# Patient Record
Sex: MALE | Race: WHITE | NOT HISPANIC OR LATINO | Employment: FULL TIME | URBAN - METROPOLITAN AREA
[De-identification: names, ages, dates, MRNs, and addresses within clinical notes are randomized per-mention and may not be internally consistent; named-entity substitution may affect disease eponyms.]

---

## 2018-09-04 DIAGNOSIS — Z13.6 SCREENING FOR CARDIOVASCULAR CONDITION: Primary | ICD-10-CM

## 2018-09-07 LAB
ALBUMIN SERPL-MCNC: 4.8 G/DL (ref 3.5–5.5)
ALBUMIN/GLOB SERPL: 2.5 {RATIO} (ref 1.2–2.2)
ALP SERPL-CCNC: 74 IU/L (ref 39–117)
ALT SERPL-CCNC: 25 IU/L (ref 0–44)
AST SERPL-CCNC: 22 IU/L (ref 0–40)
BILIRUB SERPL-MCNC: 0.7 MG/DL (ref 0–1.2)
BUN SERPL-MCNC: 11 MG/DL (ref 6–24)
BUN/CREAT SERPL: 11 (ref 9–20)
CALCIUM SERPL-MCNC: 9.9 MG/DL (ref 8.7–10.2)
CHLORIDE SERPL-SCNC: 101 MMOL/L (ref 96–106)
CHOLEST SERPL-MCNC: 206 MG/DL (ref 100–199)
CHOLEST/HDLC SERPL: 3.7 RATIO (ref 0–5)
CO2 SERPL-SCNC: 25 MMOL/L (ref 20–29)
CREAT SERPL-MCNC: 1.01 MG/DL (ref 0.76–1.27)
GLOBULIN SER-MCNC: 1.9 G/DL (ref 1.5–4.5)
GLUCOSE SERPL-MCNC: 94 MG/DL (ref 65–99)
HDLC SERPL-MCNC: 56 MG/DL
LDLC SERPL CALC-MCNC: 128 MG/DL (ref 0–99)
POTASSIUM SERPL-SCNC: 4.7 MMOL/L (ref 3.5–5.2)
PROT SERPL-MCNC: 6.7 G/DL (ref 6–8.5)
SL AMB EGFR AFRICAN AMERICAN: 103 ML/MIN/1.73
SL AMB EGFR NON AFRICAN AMERICAN: 89 ML/MIN/1.73
SL AMB VLDL CHOLESTEROL CALC: 22 MG/DL (ref 5–40)
SODIUM SERPL-SCNC: 141 MMOL/L (ref 134–144)
TRIGL SERPL-MCNC: 111 MG/DL (ref 0–149)

## 2018-09-13 ENCOUNTER — OFFICE VISIT (OUTPATIENT)
Dept: FAMILY MEDICINE CLINIC | Facility: CLINIC | Age: 48
End: 2018-09-13
Payer: COMMERCIAL

## 2018-09-13 VITALS
HEART RATE: 88 BPM | BODY MASS INDEX: 32.9 KG/M2 | WEIGHT: 235 LBS | DIASTOLIC BLOOD PRESSURE: 80 MMHG | RESPIRATION RATE: 16 BRPM | SYSTOLIC BLOOD PRESSURE: 128 MMHG | HEIGHT: 71 IN

## 2018-09-13 DIAGNOSIS — Z00.00 ENCOUNTER FOR WELLNESS EXAMINATION IN ADULT: Primary | ICD-10-CM

## 2018-09-13 DIAGNOSIS — F31.78 BIPOLAR DISORDER, IN FULL REMISSION, MOST RECENT EPISODE MIXED (HCC): ICD-10-CM

## 2018-09-13 DIAGNOSIS — E78.5 BORDERLINE HYPERLIPIDEMIA: ICD-10-CM

## 2018-09-13 DIAGNOSIS — Z23 NEED FOR DIPHTHERIA-TETANUS-PERTUSSIS (TDAP) VACCINE: ICD-10-CM

## 2018-09-13 DIAGNOSIS — Z23 FLU VACCINE NEED: ICD-10-CM

## 2018-09-13 PROCEDURE — 99396 PREV VISIT EST AGE 40-64: CPT | Performed by: FAMILY MEDICINE

## 2018-09-13 PROCEDURE — 90472 IMMUNIZATION ADMIN EACH ADD: CPT

## 2018-09-13 PROCEDURE — 90715 TDAP VACCINE 7 YRS/> IM: CPT

## 2018-09-13 PROCEDURE — 90471 IMMUNIZATION ADMIN: CPT

## 2018-09-13 PROCEDURE — 90682 RIV4 VACC RECOMBINANT DNA IM: CPT

## 2018-09-13 RX ORDER — ZOLPIDEM TARTRATE 10 MG/1
10 TABLET ORAL
Refills: 1 | COMMUNITY
Start: 2018-08-26

## 2018-09-13 RX ORDER — LORAZEPAM 1 MG/1
TABLET ORAL
Refills: 1 | COMMUNITY
Start: 2018-06-29

## 2018-09-13 RX ORDER — LAMOTRIGINE 200 MG/1
TABLET ORAL
COMMUNITY
Start: 2018-09-09

## 2018-09-13 RX ORDER — FLUTICASONE PROPIONATE 50 MCG
SPRAY, SUSPENSION (ML) NASAL
COMMUNITY
Start: 2014-04-25 | End: 2022-03-04

## 2018-09-13 RX ORDER — CITALOPRAM 20 MG/1
20 TABLET ORAL DAILY
Refills: 1 | COMMUNITY
Start: 2018-09-01

## 2018-09-13 NOTE — PROGRESS NOTES
HPI:  Juno Jamison is a 56 y o  male here for his yearly health maintenance exam    Patient Active Problem List   Diagnosis    Bipolar disorder ([facility] [address] )    Borderline hyperlipidemia     Past Medical History:   Diagnosis Date    Bipolar 1 disorder ([facility] )      Routine annual physical   Labs are reviewed  Borderline cholesterol  Patient diagnosed bipolar is followed at [facility] and has been stable  1  Advanced Directive: yes     2  Durable Power of  for Healthcare: yes     3  Social History: no              Marital History:               Work Status: full time              Drug and alcohol History: remote use THC- none current              Alcohol Use: 2-4 /week     4  General Health: good              Regular Dental Visits:yes              Vision problems:glasses              Hearing Loss:no              Immunizations up to date: no                 Lifestyle:                           Healthy Diet:yes                          Tobacco Use:no                          Regular exercise:occ                          Weight concerns:yes                          Drug abuse: none     5  Over the past 2 weeks, how often have you been bothered by the following:              Little interest or pleasure in doing things:not at all              Felling down, depressed or hopeless: not at all    Current Outpatient Prescriptions   Medication Sig Dispense Refill    citalopram (CeleXA) 20 mg tablet Take 20 mg by mouth daily  1    fluticasone (FLONASE) 50 mcg/act nasal spray into each nostril      lamoTRIgine (LaMICtal) 200 MG tablet       LORazepam (ATIVAN) 1 mg tablet TAKE HALF TO 1 TABLET BY MOUTH TWICE A DAY AS NEEDED  1    zolpidem (AMBIEN) 10 mg tablet Take 10 mg by mouth daily at bedtime as needed  1     No current facility-administered medications for this visit        No Known Allergies  Immunization History   Administered Date(s) Administered    Influenza, recombinant, quadrivalent,injectable, preservative free 09/13/2018       Patient Care Team:  Lisa Goetz MD as PCP - General (Family Medicine)    Review of Systems   Constitutional: Negative for activity change, appetite change, diaphoresis and fatigue  Respiratory: Negative for cough, chest tightness, shortness of breath and wheezing  Cardiovascular: Negative for chest pain, palpitations and leg swelling  Fast or slow heart rate   Gastrointestinal: Negative for abdominal pain, blood in stool, constipation, diarrhea, nausea and vomiting  Genitourinary: Negative for difficulty urinating, dysuria and frequency  Musculoskeletal: Negative for arthralgias, gait problem, joint swelling and myalgias  Neurological: Negative for dizziness, weakness, light-headedness, numbness and headaches  Psychiatric/Behavioral: Negative for agitation, confusion, dysphoric mood and sleep disturbance  The patient is not nervous/anxious  Physical Exam :  Physical Exam   Constitutional: He is oriented to person, place, and time  He appears well-developed and well-nourished  No distress  HENT:   Head: Normocephalic and atraumatic  Right Ear: Tympanic membrane, external ear and ear canal normal    Left Ear: Tympanic membrane, external ear and ear canal normal    Nose: No mucosal edema or rhinorrhea  Right sinus exhibits no maxillary sinus tenderness  Left sinus exhibits no maxillary sinus tenderness  Mouth/Throat: Uvula is midline  Mucous membranes are not pale and not dry  Normal dentition  No oropharyngeal exudate  Eyes: EOM are normal  Pupils are equal, round, and reactive to light  Right eye exhibits no discharge  Left eye exhibits no discharge  Neck: Normal range of motion  Neck supple  No thyromegaly present  Cardiovascular: Normal rate, regular rhythm, normal heart sounds and intact distal pulses  No murmur heard  Pulmonary/Chest: Effort normal and breath sounds normal  No respiratory distress  He has no wheezes  He has no rales  Abdominal: Soft  He exhibits no mass  There is no tenderness  There is no guarding  Musculoskeletal: Normal range of motion  He exhibits no edema or tenderness  Lymphadenopathy:     He has no cervical adenopathy  Neurological: He is alert and oriented to person, place, and time  No cranial nerve deficit  Skin: No rash noted  He is not diaphoretic  No erythema  Psychiatric: He has a normal mood and affect  His behavior is normal          Reviewed Updated [facility] Prior Wellness Visits:   Last Health Maintenance visit information was reviewed, patient interviewed , no change since last HM visit yes  Last HM visit information was reviewed, patient interviewed and updates made to the record today yes    Assessment and Plan:  1  Encounter for wellness examination in adult     2  Flu vaccine need  influenza vaccine, [id number], quadrivalent, recombinant, PF, 0 5 mL, for patients 18-49 yr with comorbidities (FLUBLOK)   3  Borderline hyperlipidemia     4  Bipolar disorder, in full remission, most recent episode mixed ([facility] [address] )     5  Need for diphtheria-tetanus-pertussis (Tdap) vaccine  TDAP VACCINE GREATER THAN OR EQUAL TO 8YO IM       Health Maintenance Due   Topic Date Due    Depression Screening PHQ  1970    DTaP,Tdap,and Td Vaccines (1 - Tdap) 12/01/1992    INFLUENZA VACCINE  09/01/2018      Patient Instructions     Health maintenance-borderline cholesterol  Information given  Flu shot and tetanus shot today  Discussed decreasing total calories and increasing exercise  Metabolic screens are current  Depression screen is negative  Bipolar disorder treated at [facility] has been stable  Advanced directive is in place  Cholesterol and Your Health   AMBULATORY CARE:   Cholesterol  is a waxy, fat-like substance  Cholesterol is made by your body, but also comes from certain foods you eat  Your body uses cholesterol to make hormones and new cells  Your body also uses cholesterol to protect nerves  Cholesterol comes from foods such as meat and dairy products  Your total cholesterol level is made up by LDL cholesterol, HDL cholesterol, and triglycerides:  · LDL cholesterol  is called bad cholesterol  because it forms plaque in your arteries  As plaque builds up, your arteries become narrow, and less blood flows through  When plaque decreases blood flow to your heart, you may have chest pain  If plaque completely blocks an artery that bring blood to your heart, you may have a heart attack  Plaque can break off and form blood clots  Blood clots may block arteries in your brain and cause a stroke  · HDL cholesterol  is called good cholesterol  because it helps remove LDL cholesterol from your arteries  It does this by attaching to LDL cholesterol and carrying it to your liver  Your liver breaks down LDL cholesterol so your body can get rid of it  High levels of HDL cholesterol can help prevent a heart attack and stroke  Low levels of HDL cholesterol can increase your risk for heart disease, heart attack, and stroke  · Triglycerides  are a type of fat that store energy from foods you eat  High levels of triglycerides also cause plaque buildup  This can increase your risk for a heart attack or stroke  If your triglyceride level is high, your LDL cholesterol level may also be high  How food affects your cholesterol levels:   · Unhealthy fats  increase LDL cholesterol and triglyceride levels in your blood  They are found in foods high in cholesterol, saturated fat, and trans fat:     ¨ Cholesterol  is found in eggs, dairy, and meat  ¨ Saturated fat  is found in butter, cheese, ice cream, whole milk, and coconut oil  Saturated fat is also found in meat, such as sausage, hot dogs, and bologna  ¨ Trans fat  is found in liquid oils and is used in fried and baked foods  Foods that contain trans fats include chips, crackers, muffins, sweet rolls, microwave popcorn, and cookies      · Healthy fats,  also called unsaturated fats, help lower LDL cholesterol and triglyceride levels  Healthy fats include the following:     ¨ Monounsaturated fats  are found in foods such as olive oil, canola oil, avocado, nuts, and olives  ¨ Polyunsaturated fats,  such as omega 3 fats, are found in fish, such as salmon, trout, and tuna  They can also be found in plant foods such as flaxseed, walnuts, and soybeans  Other things that affect your cholesterol levels:   · Smoking cigarettes    · Being overweight or obese     · Drinking large amounts of alcohol    · Not enough exercise or no exercise    · Certain genes passed from your parents to you  What you need to know about having your cholesterol levels checked: Adults 21to 39years of age should have their cholesterol levels checked every 4 to 6 years  Adults 45 years and older should have their cholesterol checked every 1 to 2 years  You may need your cholesterol checked more often, or at a younger age, if you have risk factors for heart disease  You may also need to have your cholesterol checked more often if you have other health conditions, such as diabetes  Blood tests are used to check cholesterol levels  Blood tests measure your levels of triglycerides, LDL cholesterol, and HDL cholesterol  Cholesterol level goals: Your cholesterol level goal may depend on your risk for heart disease  It may also depend on your age and other health conditions  Ask your healthcare provider if the following goals are right for you:  · Your total cholesterol level  should be less than 200 mg/dL  This number may also depend on your HDL and LDL cholesterol goals  · Your LDL cholesterol level  should be less than 130 mg/dL  · Your HDL cholesterol level  should be 60 mg/dL or higher  · Your triglyceride level  should be less than 150 mg/dL  Treatment for high cholesterol:  Treatment for high cholesterol will also decrease your risk of heart disease, heart attack, and stroke  Treatment may include any of the following:  · Medicines  may be given to lower your LDL cholesterol, triglyceride levels, or total cholesterol level  You may need medicines to lower your cholesterol if any of the following is true:     ¨ You have a history of stroke, TIA, unstable angina, or a heart attack    ¨ Your LDL cholesterol level is 190 mg/dL or higher    ¨ You are age 36to 76years of age, have diabetes, and your LDL cholesterol is 70 mg/dL or higher    ¨ You are age 36to 76years of age, have risk factors for heart disease, and your LDL cholesterol is 70 mg/dL or higher    · Lifestyle changes  include changes to your diet, exercise, weight loss, and quitting smoking  It also includes decreasing the amount of alcohol you drink  · Supplements  include fish oil, red yeast rice, and garlic  Fish oil may help lower your triglyceride and LDL cholesterol levels  It may also increase your HDL cholesterol level  Red yeast rice may help decrease your total cholesterol level and LDL cholesterol level  Garlic may help lower your total cholesterol level  Do not take these supplements without talking to your healthcare provider  Nutrition to help lower your cholesterol levels:  A registered dietitian can help you create a healthy eating plan  Read food labels and choose foods low in saturated fat, trans fats, and cholesterol  · Decrease the total amount of fat you eat  Choose lean meats, fat-free or 1% fat milk, and low-fat dairy products, such as yogurt and cheese  Try to limit or avoid red meats  Limit or do not eat fried foods or baked goods such as cookies  · Replace unhealthy fats with healthy fats  Cook foods in olive oil or canola oil  Choose soft margarines that are low in saturated fat and trans fat  Seeds, nuts, and avocados are other examples of healthy fats  · Eat foods with omega-3 fats  Examples include salmon, tuna, mackerel, walnuts, and flaxseed  Eat fish 2 times per week   Children and pregnant women should not eat fish that have high levels of mercury, such as shark, swordfish, and jose alfredo mackerel  · Increase the amount of plant-based foods you eat  Plant-based foods are low in cholesterol and fat  Eating more of these foods may help lower your cholesterol and help you lose weight  Examples of plant-based foods includes fruits, vegetables, legumes, and whole grains  Replace milk that contains dairy with almond, soy, or coconut milk  Eat beans and foods with soy for protein instead of meat  Ask your healthcare provider or dietitian for more information on plant-based foods  · Increase the amount of fiber you eat  High-fiber foods can help lower your LDL cholesterol  You should eat between 20 and 30 grams of fiber each day  Eat at least 5 servings of fruits and vegetables each day  Other examples of high-fiber foods include whole-grain or whole-wheat breads, pastas, or cereals, and brown rice  Eat 3 ounces of whole-grain foods each day  Increase fiber slowly  You may have abdominal discomfort, bloating, and gas if you add fiber to your diet too quickly  Lifestyle changes you can make to help lower your cholesterol levels:   · Maintain a healthy weight  Ask your healthcare provider how much you should weigh  Ask him or her to help you create a weight loss plan if you are overweight  Weight loss can decrease your total cholesterol and triglyceride levels  · Exercise regularly  Exercise can help lower your total cholesterol level and maintain a healthy weight  Exercise can also help increase your HDL cholesterol level  Work with your healthcare provider to create an exercise program that is right for you  Get at least 30 minutes of moderate exercise most days of the week  Examples of exercise include brisk walking, swimming, or biking  · Do not smoke  Nicotine and other chemicals in cigarettes and cigars can damage your lungs, heart, and blood vessels   They can also raise your triglyceride levels  Ask your healthcare provider for information if you currently smoke and need help to quit  E-cigarettes or smokeless tobacco still contain nicotine  Talk to your healthcare provider before you use these products  · Limit or do not drink alcohol  Alcohol can increase your triglyceride levels  Ask your healthcare provider if it is safe for you to drink alcohol  Also ask how much is safe for you to drink each day  © 2017 [address] Information is for End User's use only and may not be sold, redistributed or otherwise used for commercial purposes  All illustrations and images included in CareNotes® are the copyrighted property of [employer]  or [employer]me[employer]  The above information is an  only  It is not intended as medical advice for individual conditions or treatments  Talk to your doctor, nurse or pharmacist before following any medical regimen to see if it is safe and effective for you

## 2018-09-13 NOTE — PATIENT INSTRUCTIONS
Health maintenance-borderline cholesterol  Information given  Flu shot and tetanus shot today  Discussed decreasing total calories and increasing exercise  Metabolic screens are current  Depression screen is negative  Bipolar disorder treated at [facility] has been stable  Advanced directive is in place  Cholesterol and Your Health   AMBULATORY CARE:   Cholesterol  is a waxy, fat-like substance  Cholesterol is made by your body, but also comes from certain foods you eat  Your body uses cholesterol to make hormones and new cells  Your body also uses cholesterol to protect nerves  Cholesterol comes from foods such as meat and dairy products  Your total cholesterol level is made up by LDL cholesterol, HDL cholesterol, and triglycerides:  · LDL cholesterol  is called bad cholesterol  because it forms plaque in your arteries  As plaque builds up, your arteries become narrow, and less blood flows through  When plaque decreases blood flow to your heart, you may have chest pain  If plaque completely blocks an artery that bring blood to your heart, you may have a heart attack  Plaque can break off and form blood clots  Blood clots may block arteries in your brain and cause a stroke  · HDL cholesterol  is called good cholesterol  because it helps remove LDL cholesterol from your arteries  It does this by attaching to LDL cholesterol and carrying it to your liver  Your liver breaks down LDL cholesterol so your body can get rid of it  High levels of HDL cholesterol can help prevent a heart attack and stroke  Low levels of HDL cholesterol can increase your risk for heart disease, heart attack, and stroke  · Triglycerides  are a type of fat that store energy from foods you eat  High levels of triglycerides also cause plaque buildup  This can increase your risk for a heart attack or stroke  If your triglyceride level is high, your LDL cholesterol level may also be high    How food affects your cholesterol levels:   · Unhealthy fats  increase LDL cholesterol and triglyceride levels in your blood  They are found in foods high in cholesterol, saturated fat, and trans fat:     ¨ Cholesterol  is found in eggs, dairy, and meat  ¨ Saturated fat  is found in butter, cheese, ice cream, whole milk, and coconut oil  Saturated fat is also found in meat, such as sausage, hot dogs, and bologna  ¨ Trans fat  is found in liquid oils and is used in fried and baked foods  Foods that contain trans fats include chips, crackers, muffins, sweet rolls, microwave popcorn, and cookies  · Healthy fats,  also called unsaturated fats, help lower LDL cholesterol and triglyceride levels  Healthy fats include the following:     ¨ Monounsaturated fats  are found in foods such as olive oil, canola oil, avocado, nuts, and olives  ¨ Polyunsaturated fats,  such as omega 3 fats, are found in fish, such as salmon, trout, and tuna  They can also be found in plant foods such as flaxseed, walnuts, and soybeans  Other things that affect your cholesterol levels:   · Smoking cigarettes    · Being overweight or obese     · Drinking large amounts of alcohol    · Not enough exercise or no exercise    · Certain genes passed from your parents to you  What you need to know about having your cholesterol levels checked: Adults 21to 39years of age should have their cholesterol levels checked every 4 to 6 years  Adults 45 years and older should have their cholesterol checked every 1 to 2 years  You may need your cholesterol checked more often, or at a younger age, if you have risk factors for heart disease  You may also need to have your cholesterol checked more often if you have other health conditions, such as diabetes  Blood tests are used to check cholesterol levels  Blood tests measure your levels of triglycerides, LDL cholesterol, and HDL cholesterol  Cholesterol level goals:   Your cholesterol level goal may depend on your risk for heart disease  It may also depend on your age and other health conditions  Ask your healthcare provider if the following goals are right for you:  · Your total cholesterol level  should be less than 200 mg/dL  This number may also depend on your HDL and LDL cholesterol goals  · Your LDL cholesterol level  should be less than 130 mg/dL  · Your HDL cholesterol level  should be 60 mg/dL or higher  · Your triglyceride level  should be less than 150 mg/dL  Treatment for high cholesterol:  Treatment for high cholesterol will also decrease your risk of heart disease, heart attack, and stroke  Treatment may include any of the following:  · Medicines  may be given to lower your LDL cholesterol, triglyceride levels, or total cholesterol level  You may need medicines to lower your cholesterol if any of the following is true:     ¨ You have a history of stroke, TIA, unstable angina, or a heart attack    ¨ Your LDL cholesterol level is 190 mg/dL or higher    ¨ You are age 36to 76years of age, have diabetes, and your LDL cholesterol is 70 mg/dL or higher    ¨ You are age 36to 76years of age, have risk factors for heart disease, and your LDL cholesterol is 70 mg/dL or higher    · Lifestyle changes  include changes to your diet, exercise, weight loss, and quitting smoking  It also includes decreasing the amount of alcohol you drink  · Supplements  include fish oil, red yeast rice, and garlic  Fish oil may help lower your triglyceride and LDL cholesterol levels  It may also increase your HDL cholesterol level  Red yeast rice may help decrease your total cholesterol level and LDL cholesterol level  Garlic may help lower your total cholesterol level  Do not take these supplements without talking to your healthcare provider  Nutrition to help lower your cholesterol levels:  A registered dietitian can help you create a healthy eating plan   Read food labels and choose foods low in saturated fat, trans fats, and cholesterol  · Decrease the total amount of fat you eat  Choose lean meats, fat-free or 1% fat milk, and low-fat dairy products, such as yogurt and cheese  Try to limit or avoid red meats  Limit or do not eat fried foods or baked goods such as cookies  · Replace unhealthy fats with healthy fats  Cook foods in olive oil or canola oil  Choose soft margarines that are low in saturated fat and trans fat  Seeds, nuts, and avocados are other examples of healthy fats  · Eat foods with omega-3 fats  Examples include salmon, tuna, mackerel, walnuts, and flaxseed  Eat fish 2 times per week  Children and pregnant women should not eat fish that have high levels of mercury, such as shark, swordfish, and jose alfredo mackerel  · Increase the amount of plant-based foods you eat  Plant-based foods are low in cholesterol and fat  Eating more of these foods may help lower your cholesterol and help you lose weight  Examples of plant-based foods includes fruits, vegetables, legumes, and whole grains  Replace milk that contains dairy with almond, soy, or coconut milk  Eat beans and foods with soy for protein instead of meat  Ask your healthcare provider or dietitian for more information on plant-based foods  · Increase the amount of fiber you eat  High-fiber foods can help lower your LDL cholesterol  You should eat between 20 and 30 grams of fiber each day  Eat at least 5 servings of fruits and vegetables each day  Other examples of high-fiber foods include whole-grain or whole-wheat breads, pastas, or cereals, and brown rice  Eat 3 ounces of whole-grain foods each day  Increase fiber slowly  You may have abdominal discomfort, bloating, and gas if you add fiber to your diet too quickly  Lifestyle changes you can make to help lower your cholesterol levels:   · Maintain a healthy weight  Ask your healthcare provider how much you should weigh  Ask him or her to help you create a weight loss plan if you are overweight   Weight loss can decrease your total cholesterol and triglyceride levels  · Exercise regularly  Exercise can help lower your total cholesterol level and maintain a healthy weight  Exercise can also help increase your HDL cholesterol level  Work with your healthcare provider to create an exercise program that is right for you  Get at least 30 minutes of moderate exercise most days of the week  Examples of exercise include brisk walking, swimming, or biking  · Do not smoke  Nicotine and other chemicals in cigarettes and cigars can damage your lungs, heart, and blood vessels  They can also raise your triglyceride levels  Ask your healthcare provider for information if you currently smoke and need help to quit  E-cigarettes or smokeless tobacco still contain nicotine  Talk to your healthcare provider before you use these products  · Limit or do not drink alcohol  Alcohol can increase your triglyceride levels  Ask your healthcare provider if it is safe for you to drink alcohol  Also ask how much is safe for you to drink each day  © 2017 [address] [employer] Information is for End User's use only and may not be sold, redistributed or otherwise used for commercial purposes  All illustrations and images included in CareNotes® are the copyrighted property of [employer]  or [employer]  The above information is an  only  It is not intended as medical advice for individual conditions or treatments  Talk to your doctor, nurse or pharmacist before following any medical regimen to see if it is safe and effective for you

## 2022-02-24 ENCOUNTER — TELEPHONE (OUTPATIENT)
Dept: FAMILY MEDICINE CLINIC | Facility: CLINIC | Age: 52
End: 2022-02-24

## 2022-02-24 DIAGNOSIS — E78.5 BORDERLINE HYPERLIPIDEMIA: Primary | ICD-10-CM

## 2022-03-01 LAB
ALBUMIN SERPL-MCNC: 4.6 G/DL (ref 3.6–5.1)
ALBUMIN/GLOB SERPL: 2.3 (CALC) (ref 1–2.5)
ALP SERPL-CCNC: 125 U/L (ref 35–144)
ALT SERPL-CCNC: 20 U/L (ref 9–46)
AST SERPL-CCNC: 18 U/L (ref 10–35)
BILIRUB SERPL-MCNC: 0.7 MG/DL (ref 0.2–1.2)
BUN SERPL-MCNC: 17 MG/DL (ref 7–25)
BUN/CREAT SERPL: NORMAL (CALC) (ref 6–22)
CALCIUM SERPL-MCNC: 9.7 MG/DL (ref 8.6–10.3)
CHLORIDE SERPL-SCNC: 103 MMOL/L (ref 98–110)
CHOLEST SERPL-MCNC: 199 MG/DL
CHOLEST/HDLC SERPL: 4.1 (CALC)
CO2 SERPL-SCNC: 30 MMOL/L (ref 20–32)
CREAT SERPL-MCNC: 1.1 MG/DL (ref 0.7–1.33)
GLOBULIN SER CALC-MCNC: 2 G/DL (CALC) (ref 1.9–3.7)
GLUCOSE SERPL-MCNC: 86 MG/DL (ref 65–99)
HDLC SERPL-MCNC: 48 MG/DL
LDLC SERPL CALC-MCNC: 130 MG/DL (CALC)
NONHDLC SERPL-MCNC: 151 MG/DL (CALC)
POTASSIUM SERPL-SCNC: 4.3 MMOL/L (ref 3.5–5.3)
PROT SERPL-MCNC: 6.6 G/DL (ref 6.1–8.1)
SL AMB EGFR AFRICAN AMERICAN: 90 ML/MIN/1.73M2
SL AMB EGFR NON AFRICAN AMERICAN: 78 ML/MIN/1.73M2
SODIUM SERPL-SCNC: 140 MMOL/L (ref 135–146)
TRIGL SERPL-MCNC: 100 MG/DL

## 2022-03-04 ENCOUNTER — OFFICE VISIT (OUTPATIENT)
Dept: FAMILY MEDICINE CLINIC | Facility: CLINIC | Age: 52
End: 2022-03-04
Payer: COMMERCIAL

## 2022-03-04 VITALS
HEART RATE: 69 BPM | HEIGHT: 70 IN | BODY MASS INDEX: 33.07 KG/M2 | OXYGEN SATURATION: 99 % | TEMPERATURE: 98 F | WEIGHT: 231 LBS | DIASTOLIC BLOOD PRESSURE: 84 MMHG | SYSTOLIC BLOOD PRESSURE: 133 MMHG

## 2022-03-04 DIAGNOSIS — Z00.00 ANNUAL PHYSICAL EXAM: Primary | ICD-10-CM

## 2022-03-04 DIAGNOSIS — F31.78 BIPOLAR DISORDER, IN FULL REMISSION, MOST RECENT EPISODE MIXED (HCC): ICD-10-CM

## 2022-03-04 DIAGNOSIS — E78.5 BORDERLINE HYPERLIPIDEMIA: ICD-10-CM

## 2022-03-04 DIAGNOSIS — Z12.11 ENCOUNTER FOR SCREENING COLONOSCOPY: ICD-10-CM

## 2022-03-04 PROCEDURE — 3725F SCREEN DEPRESSION PERFORMED: CPT | Performed by: FAMILY MEDICINE

## 2022-03-04 PROCEDURE — 1036F TOBACCO NON-USER: CPT | Performed by: FAMILY MEDICINE

## 2022-03-04 PROCEDURE — 99396 PREV VISIT EST AGE 40-64: CPT | Performed by: FAMILY MEDICINE

## 2022-03-04 PROCEDURE — 3008F BODY MASS INDEX DOCD: CPT | Performed by: FAMILY MEDICINE

## 2022-03-04 RX ORDER — HYDROXYZINE HYDROCHLORIDE 10 MG/1
10 TABLET, FILM COATED ORAL 3 TIMES DAILY PRN
COMMUNITY
Start: 2022-02-10

## 2022-03-04 RX ORDER — BUPROPION HYDROCHLORIDE 300 MG/1
300 TABLET ORAL DAILY
COMMUNITY
Start: 2021-12-29

## 2022-03-04 NOTE — PATIENT INSTRUCTIONS
Continue follow-up with [facility]  Would flu you to start intensify your exercise  I would ramp up the intensity  Continue to work on diet, exercise, weight loss  Repeat cholesterol, CMP, with PSA 1 year     Wellness Visit for Adults   AMBULATORY CARE:   A wellness visit  is when you see your healthcare provider to get screened for health problems  Your healthcare provider will also give you advice on how to stay healthy  Write down your questions so you remember to ask them  Ask your healthcare provider how often you should have a wellness visit  What happens at a wellness visit:  Your healthcare provider will ask about your health, and your family history of health problems  This includes high blood pressure, heart disease, and cancer  He or she will ask if you have symptoms that concern you, if you smoke, and about your mood  You may also be asked about your intake of medicines, supplements, food, and alcohol  Any of the following may be done:  · Your weight  will be checked  Your height may also be checked so your body mass index (BMI) can be calculated  Your BMI shows if you are at a healthy weight  · Your blood pressure  and heart rate will be checked  Your temperature may also be checked  · Blood and urine tests  may be done  Blood tests may be done to check your cholesterol levels  Abnormal cholesterol levels increase your risk for heart disease and stroke  You may also need a blood or urine test to check for diabetes if you are at increased risk  Urine tests may be done to look for signs of an infection or kidney disease  · A physical exam  includes checking your heartbeat and lungs with a stethoscope  Your healthcare provider may also check your skin to look for sun damage  · Screening tests  may be recommended  A screening test is done to check for diseases that may not cause symptoms   The screening tests you may need depend on your age, gender, family history, and lifestyle habits  For example, colorectal screening may be recommended if you are 48years old or older  Screening tests you need if you are a woman:   · A Pap smear  is used to screen for cervical cancer  Pap smears are usually done every 3 to 5 years depending on your age  You may need them more often if you have had abnormal Pap smear test results in the past  Ask your healthcare provider how often you should have a Pap smear  · A mammogram  is an x-ray of your breasts to screen for breast cancer  Experts recommend mammograms every 2 years starting at age 48 years  You may need a mammogram at age 52 years or younger if you have an increased risk for breast cancer  Talk to your healthcare provider about when you should start having mammograms and how often you need them  Vaccines you may need:   · Get an influenza vaccine  every year  The influenza vaccine protects you from the flu  Several types of viruses cause the flu  The viruses change over time, so new vaccines are made each year  · Get a tetanus-diphtheria (Td) booster vaccine  every 10 years  This vaccine protects you against tetanus and diphtheria  Tetanus is a severe infection that may cause painful muscle spasms and lockjaw  Diphtheria is a severe bacterial infection that causes a thick covering in the back of your mouth and throat  · Get a human papillomavirus (HPV) vaccine  if you are female and aged 23 to 32 or male 23 to 24 and never received it  This vaccine protects you from HPV infection  HPV is the most common infection spread by sexual contact  HPV may also cause vaginal, penile, and anal cancers  · Get a pneumococcal vaccine  if you are aged 72 years or older  The pneumococcal vaccine is an injection given to protect you from pneumococcal disease  Pneumococcal disease is an infection caused by pneumococcal bacteria  The infection may cause pneumonia, meningitis, or an ear infection      · Get a shingles vaccine  if you are 61 or older, even if you have had shingles before  The shingles vaccine is an injection to protect you from the varicella-zoster virus  This is the same virus that causes chickenpox  Shingles is a painful rash that develops in people who had chickenpox or have been exposed to the virus  How to eat healthy:  My Plate is a model for planning healthy meals  It shows the types and amounts of foods that should go on your plate  Fruits and vegetables make up about half of your plate, and grains and protein make up the other half  A serving of dairy is included on the side of your plate  The amount of calories and serving sizes you need depends on your age, gender, weight, and height  Examples of healthy foods are listed below:  · Eat a variety of vegetables  such as dark green, red, and orange vegetables  You can also include canned vegetables low in sodium (salt) and frozen vegetables without added butter or sauces  · Eat a variety of fresh fruits , canned fruit in 100% juice, frozen fruit, and dried fruit  · Include whole grains  At least half of the grains you eat should be whole grains  Examples include whole-wheat bread, wheat pasta, brown rice, and whole-grain cereals such as oatmeal     · Eat a variety of protein foods such as seafood (fish and shellfish), lean meat, and poultry without skin (turkey and chicken)  Examples of lean meats include pork leg, shoulder, or tenderloin, and beef round, sirloin, tenderloin, and extra lean ground beef  Other protein foods include eggs and egg substitutes, beans, peas, soy products, nuts, and seeds  · Choose low-fat dairy products such as skim or 1% milk or low-fat yogurt, cheese, and cottage cheese  · Limit unhealthy fats  such as butter, hard margarine, and shortening  Exercise:  Exercise at least 30 minutes per day on most days of the week  Some examples of exercise include walking, biking, dancing, and swimming   You can also fit in more physical activity by taking the stairs instead of the elevator or parking farther away from stores  Include muscle strengthening activities 2 days each week  Regular exercise provides many health benefits  It helps you manage your weight, and decreases your risk for type 2 diabetes, heart disease, stroke, and high blood pressure  Exercise can also help improve your mood  Ask your healthcare provider about the best exercise plan for you  General health and safety guidelines:   · Do not smoke  Nicotine and other chemicals in cigarettes and cigars can cause lung damage  Ask your healthcare provider for information if you currently smoke and need help to quit  E-cigarettes or smokeless tobacco still contain nicotine  Talk to your healthcare provider before you use these products  · Limit alcohol  A drink of alcohol is 12 ounces of beer, 5 ounces of wine, or 1½ ounces of liquor  · Lose weight, if needed  Being overweight increases your risk of certain health conditions  These include heart disease, high blood pressure, type 2 diabetes, and certain types of cancer  · Protect your skin  Do not sunbathe or use tanning beds  Use sunscreen with a SPF 15 or higher  Apply sunscreen at least 15 minutes before you go outside  Reapply sunscreen every 2 hours  Wear protective clothing, hats, and sunglasses when you are outside  · Drive safely  Always wear your seatbelt  Make sure everyone in your car wears a seatbelt  A seatbelt can save your life if you are in an accident  Do not use your cell phone when you are driving  This could distract you and cause an accident  Pull over if you need to make a call or send a text message  · Practice safe sex  Use latex condoms if are sexually active and have more than one partner  Your healthcare provider may recommend screening tests for sexually transmitted infections (STIs)  · Wear helmets, lifejackets, and protective gear    Always wear a helmet when you ride a bike or motorcycle, go skiing, or play sports that could cause a head injury  Wear protective equipment when you play sports  Wear a lifejacket when you are on a boat or doing water sports  © Copyright [employer] 2022 Information is for End User's use only and may not be sold, redistributed or otherwise used for commercial purposes  All illustrations and images included in CareNotes® are the copyrighted property of A D A M , Inc  or [facility] Hernesto Coleman   The above information is an  only  It is not intended as medical advice for individual conditions or treatments  Talk to your doctor, nurse or pharmacist before following any medical regimen to see if it is safe and effective for you

## 2022-03-04 NOTE — PROGRESS NOTES
[address]    NAME: Soila Cuello  AGE: 49 y o  SEX: male  : 1970     DATE: 3/4/2022     Assessment and Plan:     Problem List Items Addressed This Visit        Other    Bipolar disorder [facility])     Patient followed at [facility]  Psychiatrist angelica right medications  Has been stable  Relevant Medications    buPROPion (WELLBUTRIN XL) 300 mg 24 hr tablet    hydrOXYzine HCL (ATARAX) 10 mg tablet    Borderline hyperlipidemia      Other Visit Diagnoses     Annual physical exam    -  Primary    Encounter for screening colonoscopy        Relevant Orders    Ambulatory referral to Gastroenterology      Continue follow-up with [facility]  Would flu you to start intensify your exercise  I would ramp up the intensity  Continue to work on diet, exercise, weight loss  Repeat cholesterol, CMP, with PSA 1 year  Immunizations and preventive care screenings were discussed with patient today  Appropriate education was printed on patient's after visit summary  Counseling:  Alcohol/drug use: discussed moderation in alcohol intake, the recommendations for healthy alcohol use, and avoidance of illicit drug use  Dental Health: discussed importance of regular tooth brushing, flossing, and dental visits  · Exercise: the importance of regular exercise/physical activity was discussed  Recommend exercise 3-5 times per week for at least 30 minutes  Return in about 1 year (around 3/4/2023)  Chief Complaint:     Chief Complaint   Patient presents with    Physical Exam      History of Present Illness:     Adult Annual Physical   Patient here for a comprehensive physical exam  The patient reports see list     Diet and Physical Activity  · Diet/Nutrition: well balanced diet, limited junk food and consuming 3-5 servings of fruits/vegetables daily  · Exercise: 3-4 times a week on average        Depression Screening  PHQ-2/9 Depression Screening    Little interest or pleasure in doing things: 0 - not at all  Feeling down, depressed, or hopeless: 0 - not at all  PHQ-2 Score: 0  PHQ-2 Interpretation: Negative depression screen       General Health  · Sleep: gets 4-6 hours of sleep on average  · Hearing: decreased - bilateral   · Vision: readers  · Dental: regular dental visits, brushes teeth once daily and flosses teeth occasionally   Health  · Symptoms include: none     Review of Systems:     Review of Systems   Constitutional: Negative for activity change, appetite change, diaphoresis and fatigue  Respiratory: Negative for cough, chest tightness, shortness of breath and wheezing  Cardiovascular: Negative for chest pain, palpitations and leg swelling  Fast or slow heart rate   Gastrointestinal: Negative for abdominal pain, blood in stool, constipation, diarrhea, nausea and vomiting  Genitourinary: Negative for difficulty urinating, dysuria, frequency and hematuria  Musculoskeletal: Negative for arthralgias, gait problem, joint swelling and myalgias  Neurological: Negative for dizziness, light-headedness and headaches  Psychiatric/Behavioral: Positive for sleep disturbance  Negative for agitation, confusion and dysphoric mood  The patient is nervous/anxious         Past Medical History:     Past Medical History:   Diagnosis Date    [facility]       Past Surgical History:     Past Surgical History:   Procedure Laterality Date    CHOLECYSTECTOMY      HERNIA REPAIR        Family History:     Family History   Problem Relation Age of Onset    Diabetes Mother       Social History:     Social History     Socioeconomic History    Marital status: /Civil Union     Spouse name: None    Number of children: None    Years of education: None    Highest education level: None   Occupational History    None   Tobacco Use    Smoking status: Never Smoker    Smokeless tobacco: Never Used   Substance and Sexual Activity    Alcohol use: Yes    Drug use: No    Sexual activity: None   Other Topics Concern    None   Social History Narrative    None     Social Determinants of Health     Financial Resource Strain: Not on file   Food Insecurity: Not on file   Transportation Needs: Not on file   Physical Activity: Not on file   Stress: Not on file   Social Connections: Not on file   Intimate Partner Violence: Not on file   Housing Stability: Not on file      Current Medications:     Current Outpatient Medications   Medication Sig Dispense Refill    citalopram (CeleXA) 20 mg tablet Take 20 mg by mouth daily  1    lamoTRIgine (LaMICtal) 200 MG tablet       LORazepam (ATIVAN) 1 mg tablet TAKE HALF TO 1 TABLET BY MOUTH TWICE A DAY AS NEEDED  1    zolpidem (AMBIEN) 10 mg tablet Take 10 mg by mouth daily at bedtime as needed  1    buPROPion (WELLBUTRIN XL) 300 mg 24 hr tablet Take 300 mg by mouth daily      hydrOXYzine HCL (ATARAX) 10 mg tablet Take 10 mg by mouth 3 (three) times a day as needed       No current facility-administered medications for this visit  Allergies:     No Known Allergies   Physical Exam:     /84 (BP Location: Right arm, Patient Position: Sitting, Cuff Size: Standard)   Pulse 69   Temp 98 °F (36 7 °C) (Tympanic)   Ht 5' 10" (1 778 m)   Wt 105 kg (231 lb)   SpO2 99%   BMI 33 15 kg/m²     Physical Exam  Vitals and nursing note reviewed  HENT:      Head: Normocephalic and atraumatic  Right Ear: External ear normal       Left Ear: External ear normal    Eyes:      General:         Right eye: No discharge  Left eye: No discharge  Pupils: Pupils are equal, round, and reactive to light  Neck:      Thyroid: No thyromegaly  Trachea: No tracheal deviation  Cardiovascular:      Rate and Rhythm: Normal rate and regular rhythm  Heart sounds: Normal heart sounds  No murmur heard        Pulmonary:      Effort: Pulmonary effort is normal  No respiratory distress  Breath sounds: Normal breath sounds  No wheezing  Abdominal:      General: Bowel sounds are normal  There is no distension  Palpations: Abdomen is soft  There is no mass  Tenderness: There is no abdominal tenderness  Musculoskeletal:         General: Normal range of motion  Cervical back: Normal range of motion and neck supple  Right lower leg: No edema  Left lower leg: No edema  Lymphadenopathy:      Cervical: No cervical adenopathy  Neurological:      Mental Status: He is alert and oriented to person, place, and time     Psychiatric:         Mood and Affect: Mood normal          Behavior: Behavior normal           Chrystal Frankel, MD  [address]

## 2022-04-07 ENCOUNTER — OFFICE VISIT (OUTPATIENT)
Dept: FAMILY MEDICINE CLINIC | Facility: CLINIC | Age: 52
End: 2022-04-07
Payer: COMMERCIAL

## 2022-04-07 VITALS
HEART RATE: 64 BPM | WEIGHT: 232 LBS | TEMPERATURE: 97.2 F | HEIGHT: 70 IN | OXYGEN SATURATION: 98 % | BODY MASS INDEX: 33.21 KG/M2 | SYSTOLIC BLOOD PRESSURE: 126 MMHG | DIASTOLIC BLOOD PRESSURE: 74 MMHG

## 2022-04-07 DIAGNOSIS — M79.671 FOOT PAIN, RIGHT: Primary | ICD-10-CM

## 2022-04-07 PROCEDURE — 3725F SCREEN DEPRESSION PERFORMED: CPT | Performed by: FAMILY MEDICINE

## 2022-04-07 PROCEDURE — 99213 OFFICE O/P EST LOW 20 MIN: CPT | Performed by: FAMILY MEDICINE

## 2022-04-07 PROCEDURE — 3008F BODY MASS INDEX DOCD: CPT | Performed by: FAMILY MEDICINE

## 2022-04-07 PROCEDURE — 1036F TOBACCO NON-USER: CPT | Performed by: FAMILY MEDICINE

## 2022-04-07 RX ORDER — LORAZEPAM 0.5 MG/1
0.5 TABLET ORAL DAILY PRN
COMMUNITY
Start: 2022-03-09

## 2022-04-07 NOTE — PATIENT INSTRUCTIONS
Wear supportive shoes for the next 5-7 days  Ibuprofen 600 mg 2-3 times daily with food  Ice the area 10-15 minutes every 4-5 hours for the next 2-3 days  If things are not improving check x-ray of the right foot  Heart Healthy Diet   AMBULATORY CARE:   A heart healthy diet  is an eating plan low in unhealthy fats and sodium (salt)  The plan is high in healthy fats and fiber  A heart healthy diet helps improve your cholesterol levels and lowers your risk for heart disease and stroke  A dietitian will teach you how to read and understand food labels  Heart healthy diet guidelines to follow:   · Choose foods that contain healthy fats  ? Unsaturated fats  include monounsaturated and polyunsaturated fats  Unsaturated fat is found in foods such as soybean, canola, olive, corn, and safflower oils  It is also found in soft tub margarine that is made with liquid vegetable oil  ? Omega-3 fat  is found in certain fish, such as salmon, tuna, and trout, and in walnuts and flaxseed  Eat fish high in omega-3 fats at least 2 times a week  · Get 20 to 30 grams of fiber each day  Fruits, vegetables, whole-grain foods, and legumes (cooked beans) are good sources of fiber  · Limit or do not have unhealthy fats  ? Cholesterol  is found in animal foods, such as eggs and lobster, and in dairy products made from whole milk  Limit cholesterol to less than 200 mg each day  ? Saturated fat  is found in meats, such as chahal and hamburger  It is also found in chicken or turkey skin, whole milk, and butter  Limit saturated fat to less than 7% of your total daily calories  ? Trans fat  is found in packaged foods, such as potato chips and cookies  It is also in hard margarine, some fried foods, and shortening  Do not eat foods that contain trans fats  · Limit sodium as directed  You may be told to limit sodium to 2,000 to 2,300 mg each day  Choose low-sodium or no-salt-added foods   Add little or no salt to food you prepare  Use herbs and spices in place of salt  Include the following in your heart healthy plan:  Ask your dietitian or healthcare provider how many servings to have from each of the following food groups:  · Grains:      ? Whole-wheat breads, cereals, and pastas, and brown rice    ? Low-fat, low-sodium crackers and chips    · Vegetables:      ? Broccoli, green beans, green peas, and spinach    ? Collards, kale, and lima beans    ? Carrots, sweet potatoes, tomatoes, and peppers    ? Canned vegetables with no salt added    · Fruits:      ? Bananas, peaches, pears, and pineapple    ? Grapes, raisins, and dates    ? Oranges, tangerines, grapefruit, orange juice, and grapefruit juice    ? Apricots, mangoes, melons, and papaya    ? Raspberries and strawberries    ? Canned fruit with no added sugar    · Low-fat dairy:      ? Nonfat (skim) milk, 1% milk, and low-fat almond, cashew, or soy milks fortified with calcium    ? Low-fat cheese, regular or frozen yogurt, and cottage cheese    · Meats and proteins:      ? Lean cuts of beef and pork (loin, leg, round), skinless chicken and turkey    ? Legumes, soy products, egg whites, or nuts    Limit or do not include the following in your heart healthy plan:   · Unhealthy fats and oils:      ? Whole or 2% milk, cream cheese, sour cream, or cheese    ? High-fat cuts of beef (T-bone steaks, ribs), chicken or turkey with skin, and organ meats such as liver    ? Butter, stick margarine, shortening, and cooking oils such as coconut or palm oil    · Foods and liquids high in sodium:      ? Packaged foods, such as frozen dinners, cookies, macaroni and cheese, and cereals with more than 300 mg of sodium per serving    ? Vegetables with added sodium, such as instant potatoes, vegetables with added sauces, or regular canned vegetables    ? Cured or smoked meats, such as hot dogs, chahal, and sausage    ?  High-sodium ketchup, barbecue sauce, salad dressing, pickles, olives, soy sauce, or miso    · Foods and liquids high in sugar:      ? Candy, cake, cookies, pies, or doughnuts    ? Soft drinks (soda), sports drinks, or sweetened tea    ? Canned or dry mixes for cakes, soups, sauces, or gravies    Other healthy heart guidelines:   · Do not smoke  Nicotine and other chemicals in cigarettes and cigars can cause lung and heart damage  Ask your healthcare provider for information if you currently smoke and need help to quit  E-cigarettes or smokeless tobacco still contain nicotine  Talk to your healthcare provider before you use these products  · Limit or do not drink alcohol as directed  Alcohol can damage your heart and raise your blood pressure  Your healthcare provider may give you specific daily and weekly limits  The general recommended limit is 1 drink a day for women 21 or older and for men 72 or older  Do not have more than 3 drinks in a day or 7 in a week  The recommended limit is 2 drinks a day for men 24to 59years of age  Do not have more than 4 drinks in a day or 14 in a week  A drink of alcohol is 12 ounces of beer, 5 ounces of wine, or 1½ ounces of liquor  · Exercise regularly  Exercise can help you maintain a healthy weight and improve your blood pressure and cholesterol levels  Regular exercise can also decrease your risk for heart problems  Ask your healthcare provider about the best exercise plan for you  Do not start an exercise program without asking your healthcare provider  Follow up with your doctor or cardiologist as directed:  Write down your questions so you remember to ask them during your visits  © [employer] 2022 Information is for End User's use only and may not be sold, redistributed or otherwise used for commercial purposes  All illustrations and images included in CareNotes® are the copyrighted property of A [employer] A M [employer] Inc  or Hugo Coleman   The above information is an  only   It is not intended as medical advice for individual conditions or treatments  Talk to your doctor, nurse or pharmacist before following any medical regimen to see if it is safe and effective for you

## 2022-04-07 NOTE — PROGRESS NOTES
[address]        NAME: Jay Don is a 49 y o  male  : 1970    MRN: [id number]  DATE: 2022  TIME: 5:15 PM    Assessment and Plan   Foot pain, right [M79 671]  1  Foot pain, right  XR foot 3+ vw right   2  BMI 33 0-33 9,adult         No problem-specific Assessment & Plan notes found for this encounter  Patient Instructions     Patient Instructions      Wear supportive shoes for the next 5-7 days  Ibuprofen 600 mg 2-3 times daily with food  Ice the area 10-15 minutes every 4-5 hours for the next 2-3 days  If things are not improving check x-ray of the right foot  Heart Healthy Diet   AMBULATORY CARE:   A heart healthy diet  is an eating plan low in unhealthy fats and sodium (salt)  The plan is high in healthy fats and fiber  A heart healthy diet helps improve your cholesterol levels and lowers your risk for heart disease and stroke  A dietitian will teach you how to read and understand food labels  Heart healthy diet guidelines to follow:   · Choose foods that contain healthy fats  ? Unsaturated fats  include monounsaturated and polyunsaturated fats  Unsaturated fat is found in foods such as soybean, canola, olive, corn, and safflower oils  It is also found in soft tub margarine that is made with liquid vegetable oil  ? Omega-3 fat  is found in certain fish, such as salmon, tuna, and trout, and in walnuts and flaxseed  Eat fish high in omega-3 fats at least 2 times a week  · Get 20 to 30 grams of fiber each day  Fruits, vegetables, whole-grain foods, and legumes (cooked beans) are good sources of fiber  · Limit or do not have unhealthy fats  ? Cholesterol  is found in animal foods, such as eggs and lobster, and in dairy products made from whole milk  Limit cholesterol to less than 200 mg each day  ? Saturated fat  is found in meats, such as chahal and hamburger   It is also found in chicken or turkey skin, whole milk, and butter  Limit saturated fat to less than 7% of your total daily calories  ? Trans fat  is found in packaged foods, such as potato chips and cookies  It is also in hard margarine, some fried foods, and shortening  Do not eat foods that contain trans fats  · Limit sodium as directed  You may be told to limit sodium to 2,000 to 2,300 mg each day  Choose low-sodium or no-salt-added foods  Add little or no salt to food you prepare  Use herbs and spices in place of salt  Include the following in your heart healthy plan:  Ask your dietitian or healthcare provider how many servings to have from each of the following food groups:  · Grains:      ? Whole-wheat breads, cereals, and pastas, and brown rice    ? Low-fat, low-sodium crackers and chips    · Vegetables:      ? Broccoli, green beans, green peas, and spinach    ? Collards, kale, and lima beans    ? Carrots, sweet potatoes, tomatoes, and peppers    ? Canned vegetables with no salt added    · Fruits:      ? Bananas, peaches, pears, and pineapple    ? Grapes, raisins, and dates    ? Oranges, tangerines, grapefruit, orange juice, and grapefruit juice    ? Apricots, mangoes, melons, and papaya    ? Raspberries and strawberries    ? Canned fruit with no added sugar    · Low-fat dairy:      ? Nonfat (skim) milk, 1% milk, and low-fat almond, cashew, or soy milks fortified with calcium    ? Low-fat cheese, regular or frozen yogurt, and cottage cheese    · Meats and proteins:      ? Lean cuts of beef and pork (loin, leg, round), skinless chicken and turkey    ? Legumes, soy products, egg whites, or nuts    Limit or do not include the following in your heart healthy plan:   · Unhealthy fats and oils:      ? Whole or 2% milk, cream cheese, sour cream, or cheese    ? High-fat cuts of beef (T-bone steaks, ribs), chicken or turkey with skin, and organ meats such as liver    ?  Butter, stick margarine, shortening, and cooking oils such as coconut or palm oil    · Foods and liquids high in sodium:      ? Packaged foods, such as frozen dinners, cookies, macaroni and cheese, and cereals with more than 300 mg of sodium per serving    ? Vegetables with added sodium, such as instant potatoes, vegetables with added sauces, or regular canned vegetables    ? Cured or smoked meats, such as hot dogs, chahal, and sausage    ? High-sodium ketchup, barbecue sauce, salad dressing, pickles, olives, soy sauce, or miso    · Foods and liquids high in sugar:      ? Candy, cake, cookies, pies, or doughnuts    ? Soft drinks (soda), sports drinks, or sweetened tea    ? Canned or dry mixes for cakes, soups, sauces, or gravies    Other healthy heart guidelines:   · Do not smoke  Nicotine and other chemicals in cigarettes and cigars can cause lung and heart damage  Ask your healthcare provider for information if you currently smoke and need help to quit  E-cigarettes or smokeless tobacco still contain nicotine  Talk to your healthcare provider before you use these products  · Limit or do not drink alcohol as directed  Alcohol can damage your heart and raise your blood pressure  Your healthcare provider may give you specific daily and weekly limits  The general recommended limit is 1 drink a day for women 21 or older and for men 72 or older  Do not have more than 3 drinks in a day or 7 in a week  The recommended limit is 2 drinks a day for men 24to 59years of age  Do not have more than 4 drinks in a day or 14 in a week  A drink of alcohol is 12 ounces of beer, 5 ounces of wine, or 1½ ounces of liquor  · Exercise regularly  Exercise can help you maintain a healthy weight and improve your blood pressure and cholesterol levels  Regular exercise can also decrease your risk for heart problems  Ask your healthcare provider about the best exercise plan for you  Do not start an exercise program without asking your healthcare provider         Follow up with your doctor or cardiologist as directed:  Write down your questions so you remember to ask them during your visits  © Copyright [employer] 2022 Information is for End User's use only and may not be sold, redistributed or otherwise used for commercial purposes  All illustrations and images included in CareNotes® are the copyrighted property of [employer]  or Hugo Gallardo  The above information is an  only  It is not intended as medical advice for individual conditions or treatments  Talk to your doctor, nurse or pharmacist before following any medical regimen to see if it is safe and effective for you  Chief Complaint     Chief Complaint   Patient presents with    Foot Pain     right foot          History of Present Illness       Patient comes in today evaluation of right foot pain  No recognized injury  He does play pickle ball  He has localized tenderness at the 4th metatarsal head  No swelling or bruising noted on the foot  Review of Systems   Review of Systems   Respiratory: Negative for cough, chest tightness and shortness of breath  Cardiovascular: Negative for chest pain, palpitations and leg swelling  Musculoskeletal: Positive for arthralgias  Negative for joint swelling and myalgias  Skin: Negative for color change, rash and wound           Current Medications       Current Outpatient Medications:     buPROPion (WELLBUTRIN XL) 300 mg 24 hr tablet, Take 300 mg by mouth daily, Disp: , Rfl:     citalopram (CeleXA) 20 mg tablet, Take 20 mg by mouth daily, Disp: , Rfl: 1    hydrOXYzine HCL (ATARAX) 10 mg tablet, Take 10 mg by mouth 3 (three) times a day as needed, Disp: , Rfl:     lamoTRIgine (LaMICtal) 200 MG tablet, , Disp: , Rfl:     LORazepam (ATIVAN) 1 mg tablet, TAKE HALF TO 1 TABLET BY MOUTH TWICE A DAY AS NEEDED, Disp: , Rfl: 1    zolpidem (AMBIEN) 10 mg tablet, Take 10 mg by mouth daily at bedtime as needed, Disp: , Rfl: 1    LORazepam (ATIVAN) 0 5 mg tablet, Take 0 5 mg by mouth daily as needed, Disp: , Rfl:     Current Allergies     Allergies as of 04/07/2022    (No Known Allergies)            The following portions of the patient's history were reviewed and updated as appropriate: allergies, current medications, past family history, past medical history, past social history, past surgical history and problem list      Past Medical History:   Diagnosis Date    Bipolar 1 disorder ([facility] [address] )        Past Surgical History:   Procedure Laterality Date    CHOLECYSTECTOMY      HERNIA REPAIR         Family History   Problem Relation Age of Onset    Diabetes Mother          Medications have been verified  Objective   /74   Pulse 64   Temp (!) 97 2 °F (36 2 °C) (Tympanic)   Ht 5' 10" (1 778 m)   Wt 105 kg (232 lb)   SpO2 98%   BMI 33 29 kg/m²        Physical Exam     Physical Exam  Constitutional:       Appearance: He is obese  HENT:      Head: Normocephalic and atraumatic  Cardiovascular:      Rate and Rhythm: Normal rate and regular rhythm  Heart sounds: Normal heart sounds  Pulmonary:      Effort: Pulmonary effort is normal       Breath sounds: Normal breath sounds  Musculoskeletal:      Right foot: Normal range of motion  Bony tenderness present  No swelling, prominent metatarsal heads or tenderness  Comments: Tenderness at the 4th metatarsal head  No palpable deformity  Skin:     Findings: No bruising, erythema or rash  Neurological:      Mental Status: He is alert  Psychiatric:         Mood and Affect: Mood normal          Behavior: Behavior normal                  BMI Counseling: Body mass index is 33 29 kg/m²  The BMI is above normal  Nutrition recommendations include reducing portion sizes, decreasing overall calorie intake and 3-5 servings of fruits/vegetables daily

## 2022-05-18 ENCOUNTER — APPOINTMENT (OUTPATIENT)
Dept: RADIOLOGY | Facility: CLINIC | Age: 52
End: 2022-05-18
Payer: COMMERCIAL

## 2022-05-18 DIAGNOSIS — M79.671 FOOT PAIN, RIGHT: ICD-10-CM

## 2022-05-18 PROCEDURE — 73630 X-RAY EXAM OF FOOT: CPT

## 2022-05-24 ENCOUNTER — TELEPHONE (OUTPATIENT)
Dept: FAMILY MEDICINE CLINIC | Facility: CLINIC | Age: 52
End: 2022-05-24

## 2022-05-24 NOTE — TELEPHONE ENCOUNTER
----- Message from Alfonso Leon MD sent at 5/23/2022  5:20 PM EDT -----  Call patient with lab result-no abnormalities seen on x-ray  Please get progress report

## 2022-06-15 ENCOUNTER — OFFICE VISIT (OUTPATIENT)
Dept: PHYSICAL THERAPY | Facility: CLINIC | Age: 52
End: 2022-06-15
Payer: COMMERCIAL

## 2022-06-15 DIAGNOSIS — M79.671 RIGHT FOOT PAIN: Primary | ICD-10-CM

## 2022-06-15 PROCEDURE — 97161 PT EVAL LOW COMPLEX 20 MIN: CPT | Performed by: PHYSICAL THERAPIST

## 2022-06-15 NOTE — PROGRESS NOTES
PT Evaluation     Today's date: 6/15/2022  Patient name: Leatha Leiva  : 1970  MRN: [id number]  Referring provider: Damian Lainez MD  Dx:   Encounter Diagnosis     ICD-10-CM    1  Right foot pain  M79 671                  Assessment  Assessment details: Leatha Leiva is a 49 y o  male presenting to outpatient physical therapy with chief complaints of (R) foot pain  Patient describes onset of pain in April - no clear ROBER  Patient displays with abnormal gait, restricted (R) lateral digit extension, (B) ankle DF AROM restriction, good balance, positive response to foot joint mobilization, and functional restrictions  Patient's symptoms are multifactorial in nature with a primary movement diagnosis of (R) foot hypomobility resulting in pathoanatomical signs and symptoms consistent with Right foot pain  (primary encounter diagnosis) resulting in limitations in his ability to participate in recreational activities  No further referral appears necessary at this time based upon examination results  Please contact me if you have any questions ([phone])  Thank you for the opportunity to share in the care of this patient  Impairments: abnormal gait, abnormal or restricted ROM, activity intolerance, lacks appropriate home exercise program and pain with function    Symptom irritability: moderateUnderstanding of Dx/Px/POC: good   Prognosis: good  Prognosis details: Positive prognostic indicators include positive attitude toward recovery, motivated to improve, high self-efficacy, good understanding of condition, realistic expectations  Negative prognostic indicators include chronicity of symptoms  Goals  STG to be met in 2 weeks:  - Increase (R) Ankle AROM: DF to 0 degrees, lateral toe extension to 30 degrees  - I with HEP   - Patient will be able to return to walking normally  LTG to be met in 4 weeks:  - Increase (R) Ankle AROM: DF to 5 degrees, lateral toe extension to 40 degrees    - I with updated HEP   - Patient will be able to return to playing pickle ball without pain  Plan  Plan details: Prognosis above is given PT services 2x/week tapering to 1x/week over the next 4 weeks and home program adherence  Patient would benefit from: skilled physical therapy  Planned modality interventions: cryotherapy and thermotherapy: hydrocollator packs  Planned therapy interventions: joint mobilization, manual therapy, activity modification, balance, neuromuscular re-education, body mechanics training, patient education, stretching, therapeutic activities, therapeutic exercise, functional ROM exercises, home exercise program, gait training and strengthening  Plan of Care beginning date: 6/15/2022  Plan of Care expiration date: 2022  Treatment plan discussed with: patient        Subjective Evaluation    History of Present Illness  Mechanism of injury: At Evaluation (Ximena 15, 2022): Patient reports onset of (R) foot pain in April - unsure of ROBER  He describes a change in his activity level around the time of onset - started playing more pickleball  He also notes lifting a car tire with his foot around the time of onset  Since onset he describes some improvement - saw his PCP - x-ray was ordered        Red Flag Screen:  Patient denies recent fever, changes in bowel and bladder function, nausea and vomiting, weakness, unexplained weight loss, tingling, numbness, pain with coughing, or traumatic ROBER       Greatest concern: lack of full function     Quality of life: good    Pain  Current pain ratin  At best pain ratin  At worst pain ratin  Location: top of (R) foot - lateral side   Quality: dull ache    Social Support  Steps to enter house: yes  Stairs in house: yes   Lives in: multiple-level home  Lives with: spouse    Employment status: working (Desk work )    Diagnostic Tests  X-ray: normal  Treatments  No previous or current treatments  Patient Goals  Patient goal: Patient Specific Functional Scale: return to playing pickle ball 0/10, walk comfortably 0/10, stair negotiation without pain 0/10; Total 0/30        Objective     Static Posture   General Observations  Symmetrical weight bearing  Ankle/Foot   Ankle/Foot (Left): Pes planus  Ankle/Foot (Right): Pes planus  Palpation     Additional Palpation Details  TTP Base of 4th metatarsal on (R) foot     Active Range of Motion   Left Ankle/Foot   Dorsiflexion (ke): 0 degrees   Dorsiflexion (kf): 20 degrees   Plantar flexion: WFL  Inversion: WFL  Eversion: WFL  Great toe extension: WFL  Lesser toes: WFL    Right Ankle/Foot   Dorsiflexion (ke): -5 degrees   Dorsiflexion (kf): 20 degrees   Plantar flexion: WFL  Inversion: WFL  Eversion: WFL  Great toe extension: WFL  Lesser toes: 20 degrees with pain    Strength/Myotome Testing     Left Ankle/Foot   Dorsiflexion: 5  Inversion: 4+  Eversion: 4+    Right Ankle/Foot   Dorsiflexion: 5  Inversion: 4+  Eversion: 4+    Ambulation     Observational Gait   Gait: antalgic   Left stance time and left step length within functional limits  Decreased walking speed, right stance time and right step length  Left foot contact pattern: heel to toe  Right foot contact pattern: foot flat    Additional Observational Gait Details  Lacks push off at terminal stance on (R) foot              Daily Treatment Diary     DX: (R) Foot Pain   EPOC: 7/13/22  Follow Up with Referring Provider: FERN  Precautions: NA  CO-MORBIDITIES: NA  HEP ACCESS CODE: [id number]       Stage: Chronic   Stability of Symptoms:  At Evaluation: evolving - improving  Global Rating of Change:   Symptom Irritability Level: moderate  Primary Movement Diagnosis: toe hypomobility  Patient Specific Functional Scale:   6/15/22: return to playing pickle ball 0/10, walk comfortably 0/10, stair negotiation without pain 0/10;  Total 0/30  FOTO Prediction: 68 by visit 12  FOTO Progress: 49 at evaluation   Greatest Concern:  lack of full function   Current Activity Plan: added to HEP (6/15)  Current Educational Needs: progressions       Treatment Diary          Manual Therapy         Ankle Mobs         (R) Foot 4th MTJ Mobs                                                      Therapeutic Exercise  HEP         Elliptical                     Standing Gastroc Stretch 6/15         Standing Toe Ext Stretch 6/15                   Towel pulls                                                             Neuromuscular Reeducation                    SL Balance with Reach          TB Counter Balance                    LAT Heel Tap          Step Down                                                            Therapeutic Activity                              Gait Training                                        Modalities

## 2022-06-20 ENCOUNTER — APPOINTMENT (OUTPATIENT)
Dept: PHYSICAL THERAPY | Facility: CLINIC | Age: 52
End: 2022-06-20
Payer: COMMERCIAL

## 2022-06-23 ENCOUNTER — OFFICE VISIT (OUTPATIENT)
Dept: PHYSICAL THERAPY | Facility: CLINIC | Age: 52
End: 2022-06-23
Payer: COMMERCIAL

## 2022-06-23 DIAGNOSIS — M79.671 RIGHT FOOT PAIN: Primary | ICD-10-CM

## 2022-06-23 PROCEDURE — 97110 THERAPEUTIC EXERCISES: CPT | Performed by: PHYSICAL THERAPIST

## 2022-06-23 PROCEDURE — 97140 MANUAL THERAPY 1/> REGIONS: CPT | Performed by: PHYSICAL THERAPIST

## 2022-06-23 NOTE — PROGRESS NOTES
Daily Note     Today's date: 2022  Patient name: Marv Cade  : 1970  MRN: [id number]  Referring provider: Neva Nicholas MD  Dx:   Encounter Diagnosis     ICD-10-CM    1  Right foot pain  M79 671                   Subjective: Patient reports good tolerance to his IE and initial HEP  He notes he is feeling a lot better with his foot - not 100% yet though  He has been doing his HEP consistently  Objective: See treatment diary below      Assessment:   Stage: Chronic   Stability of Symptoms:  At Evaluation: evolving - improving  Global Rating of Change:   Symptom Irritability Level: moderate  Primary Movement Diagnosis: toe hypomobility  Patient Specific Functional Scale:   6/15/22: return to playing pickle ball 0/10, walk comfortably 0/10, stair negotiation without pain 0/10; Total 0/30  FOTO Prediction: 68 by visit 12  FOTO Progress: 49 at evaluation; 22: 95   Greatest Concern:  lack of full function   Current Activity Plan: added to HEP (6/15); added to HEP, discussed options for abnormal stress to ankle at work desk - take more breaks and walk, possibly use boot to limit inversion stress, return to pickle ball slowly ()  Current Educational Needs: progressions     Patient had good tolerance to manual treatment - improved ankle and toe mobility following  He demonstrated good form with previously issued HEP  He was able to progress with balance exercises well  Skilled PT continues to be required to guide progression of ankle and toe mobility to allow him to fully return to all recreational activities without pain  Plan: Continue with POC - monitor tolerance to treatment        Daily Treatment Diary     DX: (R) Foot Pain   EPOC: 22  Follow Up with Referring Provider: FERN  Precautions: NA  CO-MORBIDITIES: NA  HEP ACCESS CODE: [id number]       Treatment Diary          Manual Therapy         Ankle Mobs Post  Gr 2/3  3 min        (R) Foot 4th MTJ Mobs Gr 2/3  5 min Therapeutic Exercise  HEP         Elliptical   L1  5 min                  Standing Gastroc Stretch 6/15 30"x3        Standing Toe Ext Stretch 6/15 30"x3                  Towel pulls                                                             Neuromuscular Reeducation                    SL Balance with Reach 6/23 5 Way  5x ea        TB Counter Balance 6/23 GTB  10x ea                  LAT Heel Tap          Step Down  8" Step  10x                                                          Therapeutic Activity                              Gait Training                                        Modalities

## 2022-06-27 ENCOUNTER — APPOINTMENT (OUTPATIENT)
Dept: PHYSICAL THERAPY | Facility: CLINIC | Age: 52
End: 2022-06-27
Payer: COMMERCIAL

## 2022-06-30 ENCOUNTER — OFFICE VISIT (OUTPATIENT)
Dept: PHYSICAL THERAPY | Facility: CLINIC | Age: 52
End: 2022-06-30
Payer: COMMERCIAL

## 2022-06-30 DIAGNOSIS — M79.671 RIGHT FOOT PAIN: Primary | ICD-10-CM

## 2022-06-30 PROCEDURE — 97110 THERAPEUTIC EXERCISES: CPT | Performed by: PHYSICAL THERAPIST

## 2022-06-30 PROCEDURE — 97140 MANUAL THERAPY 1/> REGIONS: CPT | Performed by: PHYSICAL THERAPIST

## 2022-06-30 NOTE — PROGRESS NOTES
Daily Note     Today's date: 2022  Patient name: Berta Tinoco  : 1970  MRN: [id number]  Referring provider: Alexis Hannon MD  Dx:   Encounter Diagnosis     ICD-10-CM    1  Right foot pain  M79 671                   Subjective: Patient reports no adverse reaction to his LV  He reports he has returned to playing pickle ball over 1 hour with no residual pain or stiffness  Objective: See treatment diary below      Assessment:   Stage: Chronic   Stability of Symptoms:  At Evaluation: evolving - improving  Global Rating of Change:   Symptom Irritability Level: moderate  Primary Movement Diagnosis: toe hypomobility  Patient Specific Functional Scale:   6/15/22: return to playing pickle ball 0/10, walk comfortably 0/10, stair negotiation without pain 0/10; Total   FOTO Prediction: 68 by visit 12  FOTO Progress: 49 at evaluation; 22: 95   Greatest Concern:  lack of full function   Current Activity Plan: added to HEP (6/15); added to HEP, discussed options for abnormal stress to ankle at work desk - take more breaks and walk, possibly use boot to limit inversion stress, return to pickle ball slowly (); added to HEP ()  Current Educational Needs: progressions     Patient continues to respond well to manual treatment  He had good tolerance to exercise progressions and had no increased pain in the ankle throughout treatment  At this time it is recommended that he continue with an I HEP  He is to contact me if he has return of symptoms or any questions/ concerns          Plan: DC to I HEP      Daily Treatment Diary     DX: (R) Foot Pain   EPOC: 22  Follow Up with Referring Provider: FERN  Precautions: NA  CO-MORBIDITIES: NA  HEP ACCESS CODE: [id number]       Treatment Diary         Manual Therapy         Ankle Mobs Post  Gr 2/3  3 min Post  Gr 2/3  3 min       (R) Foot 4th MTJ Mobs Gr 2/3  5 min Gr 2/3  5 min                                                    Therapeutic Exercise  HEP         Elliptical   L1  5 min L2  6 min                 Standing Gastroc Stretch 6/15 30"x3 30"x3       Standing Toe Ext Stretch 6/15 30"x3 30"x3                 Towel pulls                                                             Neuromuscular Reeducation                    SL Balance with Reach 6/23 5 Way  5x ea 5 Way  5x ea       TB Counter Balance 6/23 GTB  10x ea GTB  10x ea                 LAT Heel Tap          Step Down  8" Step  10x 8" Step  15x                 FWD Lunge   15x ea       LAT Lunge   15x ea                           Therapeutic Activity                              Gait Training                                        Modalities

## 2022-12-05 PROBLEM — F12.20 MODERATE CANNABIS USE DISORDER (HCC): Status: ACTIVE | Noted: 2022-12-05

## 2022-12-13 DIAGNOSIS — F31.9 BIPOLAR AFFECTIVE DISORDER, REMISSION STATUS UNSPECIFIED (HCC): Primary | ICD-10-CM

## 2022-12-13 RX ORDER — LAMOTRIGINE 200 MG/1
TABLET ORAL
Qty: 90 TABLET | Refills: 0 | Status: SHIPPED | OUTPATIENT
Start: 2022-12-13 | End: 2023-03-08 | Stop reason: SDUPTHER

## 2022-12-13 RX ORDER — ZOLPIDEM TARTRATE 10 MG/1
TABLET ORAL
Qty: 30 TABLET | Refills: 3 | Status: SHIPPED | OUTPATIENT
Start: 2022-12-13 | End: 2023-03-08

## 2022-12-13 RX ORDER — LORAZEPAM 0.5 MG/1
TABLET ORAL
Qty: 30 TABLET | Refills: 3 | Status: SHIPPED | OUTPATIENT
Start: 2022-12-13 | End: 2023-03-08 | Stop reason: SDUPTHER

## 2022-12-13 RX ORDER — BUPROPION HYDROCHLORIDE 300 MG/1
300 TABLET ORAL DAILY
Qty: 90 TABLET | Refills: 0 | Status: SHIPPED | OUTPATIENT
Start: 2022-12-13 | End: 2023-03-08 | Stop reason: SDUPTHER

## 2023-03-08 ENCOUNTER — TELEMEDICINE (OUTPATIENT)
Dept: PSYCHIATRY | Facility: CLINIC | Age: 53
End: 2023-03-08

## 2023-03-08 DIAGNOSIS — F31.70 BIPOLAR DISORDER IN FULL REMISSION, MOST RECENT EPISODE UNSPECIFIED TYPE (HCC): Primary | ICD-10-CM

## 2023-03-08 DIAGNOSIS — F12.20 MODERATE CANNABIS USE DISORDER (HCC): ICD-10-CM

## 2023-03-08 DIAGNOSIS — F31.9 BIPOLAR AFFECTIVE DISORDER, REMISSION STATUS UNSPECIFIED (HCC): ICD-10-CM

## 2023-03-08 RX ORDER — LORAZEPAM 0.5 MG/1
TABLET ORAL
Qty: 30 TABLET | Refills: 5 | Status: SHIPPED | OUTPATIENT
Start: 2023-03-08

## 2023-03-08 RX ORDER — BUPROPION HYDROCHLORIDE 300 MG/1
300 TABLET ORAL DAILY
Qty: 90 TABLET | Refills: 1 | Status: SHIPPED | OUTPATIENT
Start: 2023-03-08

## 2023-03-08 RX ORDER — LAMOTRIGINE 200 MG/1
TABLET ORAL
Qty: 90 TABLET | Refills: 1 | Status: SHIPPED | OUTPATIENT
Start: 2023-03-08

## 2023-03-08 RX ORDER — ZOLPIDEM TARTRATE 12.5 MG/1
TABLET, FILM COATED, EXTENDED RELEASE ORAL
Qty: 30 TABLET | Refills: 5 | Status: SHIPPED | OUTPATIENT
Start: 2023-03-08

## 2023-03-08 NOTE — PSYCH
Virtual Regular Visit    Verification of patient location: at home    Patient is located in the following state in which I hold an active license [place]      Assessment/Plan:       Diagnoses and all orders for this visit:    Bipolar disorder in full remission, most recent episode unspecified type ([facility] )  -     zolpidem (AMBIEN CR) 12 5 MG CR tablet; Take 1 PO HS PRN    Moderate cannabis use disorder (HCC)    Bipolar affective disorder, remission status unspecified (HCC)  -     zolpidem (AMBIEN CR) 12 5 MG CR tablet; Take 1 PO HS PRN  -     buPROPion (WELLBUTRIN XL) 300 mg 24 hr tablet; Take 1 tablet (300 mg total) by mouth daily  -     lamoTRIgine (LaMICtal) 200 MG tablet; Take 1/2 PO BID  -     LORazepam (ATIVAN) 0 5 mg tablet; Take 1 PO QD PRN          Goals addressed in session:   Good Health  Counseling provided:      Treatment Recommendations- Risks Benefits       Immediate Medical/Psychiatric/Psychotherapy Treatments and Any Precautions:     Risks, Benefits And Possible Side Effects Of Medications:  Risks, benefits, and possible side effects of medications explained to patient and patient verbalizes understanding    Controlled Medication Discussion: The patient has been filling controlled prescriptions on time as prescribed to [facility] program      Reason for visit is No chief complaint on file  Medication Management     Encounter provider ALEK Gomes    Provider located at [address] [facility]  [address]  [phone]      Recent Visits  No visits were found meeting these conditions    Showing recent visits within past 7 days and meeting all other requirements  Today's Visits  Date Type Provider Dept   03/08/23 Telemedicine Vamsi oGmes [facility] today's visits and meeting all other requirements  Future Appointments  No visits were found meeting these conditions  Showing future appointments within next 150 days and meeting all other requirements       The patient was identified by name and date of birth  Vanesa Duran was informed that this is a telemedicine visit and that the visit is being conducted throughthe [employer]  He agrees to proceed     My office door was closed  No one else was in the room  He acknowledged consent and understanding of privacy and security of the video platform  The patient has agreed to participate and understands they can discontinue the visit at any time  Patient is aware this is a billable service  Subjective    Vanesa Duran is a 47 y o  male    Here today for a med check  This was via ReSnap    normal appetite      HPI  Mood good  Anxiety manageable   Uses Ativan occasionally as a PRN   No problems with medication   Appetite Sleep good  Health OK   Asking about Ambien CR  Denies SI/HI    Past Medical History:   Diagnosis Date   • Bipolar 1 disorder ([facility] [address] )        Past Surgical History:   Procedure Laterality Date   • CHOLECYSTECTOMY     • HERNIA REPAIR         Current Outpatient Medications   Medication Sig Dispense Refill   • buPROPion (WELLBUTRIN XL) 300 mg 24 hr tablet Take 1 tablet (300 mg total) by mouth daily 90 tablet 1   • lamoTRIgine (LaMICtal) 200 MG tablet Take 1/2 PO BID 90 tablet 1   • LORazepam (ATIVAN) 0 5 mg tablet Take 1 PO QD PRN 30 tablet 5   • zolpidem (AMBIEN CR) 12 5 MG CR tablet Take 1 PO HS PRN 30 tablet 5     No current facility-administered medications for this visit          No Known Allergies    Social History     Substance and Sexual Activity   Drug Use No       Family History   Problem Relation Age of Onset   • Diabetes Mother            Objective    Mental status:  Appearance calm and cooperative , adequate hygiene and grooming and good eye contact    Mood mood appropriate   Affect affect appropriate    Speech a normal rate and fluent   Thought Processes normal thought processes   Hallucinations no hallucinations present    Thought Content no delusions   Abnormal Thoughts no suicidal thoughts  and no homicidal thoughts    Orientation  oriented to person and place and time   Remote Memory short term memory intact and long term memory intact   Attention Span concentration intact   Intellect Appears to be of Average Intelligence   Insight Insight intact   Judgement judgment was intact   Muscle Strength Muscle strength and tone were normal and Normal gait    Language no difficulty naming common objects   Fund of Knowledge displays adequate knowledge of current events   Pain none   Pain Scale 0       Video Exam    There were no vitals filed for this visit      I spent 15 minutes directly with the patient during this visit    Patient Instructions   Continue Tx  D/C Ambien and start Ambien CR  Report Problems  Return 5 months       Visit Time    Visit Start Time: 3051  Visit Stop Time: 9311  Total Visit Duration: 17 min

## 2023-03-08 NOTE — BH TREATMENT PLAN
TREATMENT PLAN (Medication Management Only)        [facility]    Name and Date of Birth:  Johnny Coates 47 y o  1970  Date of Treatment Plan: March 8, 2023  Diagnosis/Diagnoses:    1  Bipolar disorder in full remission, most recent episode unspecified type ([facility] )    2  Moderate cannabis use disorder (HCC)    3  Bipolar affective disorder, remission status unspecified ([facility] )      Strengths/Personal Resources for Self-Care: supportive family, supportive friends, taking medications as prescribed, ability to adapt to life changes, ability to communicate needs, ability to communicate well, ability to listen, ability to reason, ability to understand psychiatric illness  Area/Areas of need (in own words): mood swings  1  Long Term Goal: maintain mood stability  Target Date:6 months - 9/8/2023  Person/Persons responsible for completion of goal: Media Crank  2  Short Term Objective (s) - How will we reach this goal?:   A  Provider new recommended medication/dosage changes and/or continue medication(s): continue current medications as prescribed Wellbutrin XL, Lamictal, Ativan, Ambien CR   B  N/A   C  N/A  Target Date:6 months - 9/8/2023  Person/Persons Responsible for Completion of Goal: Media Crank  Progress Towards Goals: stable  Treatment Modality: medication management every 5 months  Review due 180 days from date of this plan: 6 months - 9/8/2023  Expected length of service: maintenance  My Physician/PA/NP and I have developed this plan together and I agree to work on the goals and objectives  I understand the treatment goals that were developed for my treatment

## 2023-04-11 PROBLEM — F12.20 MODERATE CANNABIS USE DISORDER (HCC): Status: RESOLVED | Noted: 2022-12-05 | Resolved: 2023-04-11

## 2023-04-24 ENCOUNTER — TELEPHONE (OUTPATIENT)
Dept: FAMILY MEDICINE CLINIC | Facility: CLINIC | Age: 53
End: 2023-04-24

## 2023-04-24 NOTE — TELEPHONE ENCOUNTER
----- Message from Linda Leal MD sent at 4/24/2023 12:16 PM EDT -----  Call patient with lab result-normal study

## 2023-04-24 NOTE — TELEPHONE ENCOUNTER
----- Message from Jackeline Lozano MD sent at 4/24/2023 12:21 PM EDT -----  Call patient with lab result-Labs look good

## 2023-06-15 ENCOUNTER — TELEPHONE (OUTPATIENT)
Dept: GASTROENTEROLOGY | Facility: CLINIC | Age: 53
End: 2023-06-15

## 2023-06-15 ENCOUNTER — PREP FOR PROCEDURE (OUTPATIENT)
Dept: GASTROENTEROLOGY | Facility: CLINIC | Age: 53
End: 2023-06-15

## 2023-06-15 DIAGNOSIS — Z12.11 SCREENING FOR COLON CANCER: Primary | ICD-10-CM

## 2023-06-15 NOTE — TELEPHONE ENCOUNTER
Scheduled date of colonoscopy (as of today): 7/7/23  Physician performing colonoscopy: Jay Brown   Location of colonoscopy: [facility]  Bowel prep reviewed with patient: To be reviewed by office     Instructions reviewed with patient by:  Clearances:

## 2023-06-15 NOTE — TELEPHONE ENCOUNTER
"[facility] Assessment    Name: Kelly Hendrix  YOB: 1970  Last Height: 5' 10\" (1 778 m)  Last weight: 111 kg (245 lb)  BMI: 35 15 kg/m²  Procedure: Colon  Diagnosis: Screening colon cancer  Date of procedure: 7/7/23  Prep:   Responsible : Magnus Gomez  Phone#: [phone]  Name completing form: Kendrick Luu  Date form completed: 06/15/23      If the patient answers yes to any of these questions, schedule in a hospital  Are you pregnant: No  Do you rely on a wheelchair for mobility: No  Have you been diagnosed with End Stage Renal Disease (ESRD): No  Do you need oxygen during the day: No  Have you had a heart attack or stroke within the past three months: No  Have you had a seizure within the past three months: No  Have you ever been informed by anesthesia that you have a difficult airway: No  Additional Questions  Have you had any cardiac testing or are under the care of a Cardiologist (see cardiac list): No  Cardiac list:   Do you have an implanted cardiac defibrillator: No (Comment:  This patient should be scheduled in the hospital)    Have any bleeding problems, such as anemia or hemophilia (If patient has H&H result below 8, schedule in hospital   H&H must be within 30 days of procedure): No    Had an organ transplant within the past 3 months: No    Do you have any present infections: No  Do you get short of breath when walking a few blocks: No  Have you been diagnosed with diabetes: No  Comments (provide cardiac provider information if applicable):        "

## 2023-06-18 ENCOUNTER — DOCUMENTATION (OUTPATIENT)
Dept: PSYCHIATRY | Facility: CLINIC | Age: 53
End: 2023-06-18

## 2023-06-18 NOTE — PSYCH
[address] [facility]    Patient Name Ap To     Date of Birth: 47 y o  1970      MRN: [id number]    Admission Date: several years ago    Date of Transfer: June 18, 2023    Admission Diagnosis:     Bipolar D/O    Current Diagnosis:     No diagnosis found  Reason for Admission: Caden Nath presented for treatment due to mood instability  Primary complaints included MOOD INSTABILITY SYMPTOMS: unremarkable  Progress in Treatment: Caden Nath was seen for Medication Management  During the course of treatment he reports doing well  Episodes of Higher Level of Care: No    Transfer request Initiated by: Psychiatrist: Nurse Practitioner Hafsa Sandoval Therapist: None    Reason for Transfer Request: clinician leaving practice    Does this individual need a clinician with specialized training/expertise?: No    Is this client working with any other [facility] Providers/Therapists?  Psychiatrist: Nurse Practitioner Hafsa Sandoval Therapist: None    Other pertinent issues: None    Are there any specific individuals who would be a “best fit” or who have already agreed to accept this transfer request?      Psychiatrist: None   Therapist: None  Rationale: Not Applicable    Attempts to maintain the current therapeutic relationship: No    Transfer request routed to Clinical Coordinator for input and/or approval      Comments from other involved providers and/or clinical coordinator: ALEK Espinosa06/18/23

## 2023-06-27 ENCOUNTER — TELEPHONE (OUTPATIENT)
Dept: GASTROENTEROLOGY | Facility: CLINIC | Age: 53
End: 2023-06-27

## 2023-06-27 DIAGNOSIS — Z12.11 SCREENING FOR COLON CANCER: Primary | ICD-10-CM

## 2023-07-05 ENCOUNTER — TELEPHONE (OUTPATIENT)
Dept: PSYCHIATRY | Facility: CLINIC | Age: 53
End: 2023-07-05

## 2023-07-05 NOTE — TELEPHONE ENCOUNTER
Contacted client about cancelling appt. with ALEK Mary due to him retiring. We are in the process of bringing new providers into our practice. Once we have their schedules, we will contact you to reschedule your appt. Refills will be processed.   Any questions please call [phone]

## 2023-07-07 ENCOUNTER — ANESTHESIA (OUTPATIENT)
Dept: GASTROENTEROLOGY | Facility: AMBULATORY SURGERY CENTER | Age: 53
End: 2023-07-07

## 2023-07-07 ENCOUNTER — HOSPITAL ENCOUNTER (OUTPATIENT)
Dept: GASTROENTEROLOGY | Facility: AMBULATORY SURGERY CENTER | Age: 53
Discharge: HOME | End: 2023-07-07
Payer: COMMERCIAL

## 2023-07-07 ENCOUNTER — ANESTHESIA EVENT (OUTPATIENT)
Dept: GASTROENTEROLOGY | Facility: AMBULATORY SURGERY CENTER | Age: 53
End: 2023-07-07

## 2023-07-07 VITALS
DIASTOLIC BLOOD PRESSURE: 74 MMHG | HEIGHT: 70 IN | WEIGHT: 235 LBS | TEMPERATURE: 97.7 F | OXYGEN SATURATION: 98 % | RESPIRATION RATE: 14 BRPM | SYSTOLIC BLOOD PRESSURE: 124 MMHG | BODY MASS INDEX: 33.64 KG/M2 | HEART RATE: 61 BPM

## 2023-07-07 DIAGNOSIS — Z12.11 SCREENING FOR COLON CANCER: ICD-10-CM

## 2023-07-07 PROCEDURE — 45385 COLONOSCOPY W/LESION REMOVAL: CPT | Performed by: INTERNAL MEDICINE

## 2023-07-07 PROCEDURE — 88305 TISSUE EXAM BY PATHOLOGIST: CPT | Performed by: STUDENT IN AN ORGANIZED HEALTH CARE EDUCATION/TRAINING PROGRAM

## 2023-07-07 RX ORDER — LIDOCAINE HYDROCHLORIDE 10 MG/ML
INJECTION, SOLUTION EPIDURAL; INFILTRATION; INTRACAUDAL; PERINEURAL AS NEEDED
Status: DISCONTINUED | OUTPATIENT
Start: 2023-07-07 | End: 2023-07-07

## 2023-07-07 RX ORDER — PROPOFOL 10 MG/ML
INJECTION, EMULSION INTRAVENOUS AS NEEDED
Status: DISCONTINUED | OUTPATIENT
Start: 2023-07-07 | End: 2023-07-07

## 2023-07-07 RX ORDER — SODIUM CHLORIDE, SODIUM LACTATE, POTASSIUM CHLORIDE, CALCIUM CHLORIDE 600; 310; 30; 20 MG/100ML; MG/100ML; MG/100ML; MG/100ML
50 INJECTION, SOLUTION INTRAVENOUS CONTINUOUS
Status: DISCONTINUED | OUTPATIENT
Start: 2023-07-07 | End: 2023-07-11 | Stop reason: HOSPADM

## 2023-07-07 RX ADMIN — PROPOFOL 50 MG: 10 INJECTION, EMULSION INTRAVENOUS at 14:46

## 2023-07-07 RX ADMIN — PROPOFOL 50 MG: 10 INJECTION, EMULSION INTRAVENOUS at 14:38

## 2023-07-07 RX ADMIN — PROPOFOL 150 MG: 10 INJECTION, EMULSION INTRAVENOUS at 14:34

## 2023-07-07 RX ADMIN — SODIUM CHLORIDE, SODIUM LACTATE, POTASSIUM CHLORIDE, CALCIUM CHLORIDE 50 ML/HR: 600; 310; 30; 20 INJECTION, SOLUTION INTRAVENOUS at 14:20

## 2023-07-07 RX ADMIN — LIDOCAINE HYDROCHLORIDE 40 MG: 10 INJECTION, SOLUTION EPIDURAL; INFILTRATION; INTRACAUDAL; PERINEURAL at 14:34

## 2023-07-07 NOTE — H&P
History and Physical - [facility]  Pooja Mondragon 47 y.o. male MRN: [id number]    HPI: Pooja Mondragon is a 47y.o. year old male who presents for colon cancer screening    REVIEW OF SYSTEMS: Per the HPI, and otherwise unremarkable. Historical Information   Past Medical History:   Diagnosis Date   • Bipolar 1 disorder ([address])    • Moderate cannabis use disorder ([address]) 12/05/2022   • Sleep apnea     pt uses CPAP     Past Surgical History:   Procedure Laterality Date   • CHOLECYSTECTOMY     • COLONOSCOPY     • HERNIA REPAIR       Social History   Social History     Substance and Sexual Activity   Alcohol Use Yes     Social History     Substance and Sexual Activity   Drug Use No     Social History     Tobacco Use   Smoking Status Never   Smokeless Tobacco Never     Family History   Problem Relation Age of Onset   • Diabetes Mother        Meds/Allergies       Current Outpatient Medications:   •  zolpidem (AMBIEN CR) 12.5 MG CR tablet  •  amoxicillin (AMOXIL) 500 mg capsule  •  buPROPion (WELLBUTRIN XL) 300 mg 24 hr tablet  •  ibuprofen (MOTRIN) 800 mg tablet  •  lamoTRIgine (LaMICtal) 200 MG tablet  •  LORazepam (ATIVAN) 0.5 mg tablet  •  polyethylene glycol (GOLYTELY) 4000 mL solution    Current Facility-Administered Medications:   •  lactated ringers infusion, 50 mL/hr, Intravenous, Continuous, 50 mL/hr at 07/07/23 1420    No Known Allergies    Objective     /81   Pulse 69   Temp 97.7 °F (36.5 °C) (Temporal)   Resp (!) 11   Ht 5' 10" (1.778 m)   Wt 107 kg (235 lb)   SpO2 97%   BMI 33.72 kg/m²     PHYSICAL EXAM    Gen: NAD AAOx3  Head: Normocephalic, Atraumatic  CV: S1S2 RRR no m/r/g  CHEST: Clear b/l no c/r/w  ABD: soft, +BS NT/ND  EXT: no edema    ASSESSMENT/PLAN:  This is a 47y.o. year old male here for colonoscopy, and he is stable and optimized for his procedure.

## 2023-07-07 NOTE — ANESTHESIA PREPROCEDURE EVALUATION
Procedure:  COLONOSCOPY    Relevant Problems   CARDIO   (+) Borderline hyperlipidemia      PULMONARY   (+) Sleep apnea      Other   (+) Bipolar disorder (HCC)   (+) Obesity (BMI 30.0-34. 9)        Physical Exam    Airway    Mallampati score: II  TM Distance: >3 FB  Neck ROM: full     Dental   No notable dental hx     Cardiovascular      Pulmonary      Other Findings        Anesthesia Plan  ASA Score- 2     Anesthesia Type- IV sedation with anesthesia with ASA Monitors. Additional Monitors:   Airway Plan:           Plan Factors-    Chart reviewed. Patient summary reviewed. Patient is not a current smoker. Induction- intravenous. Postoperative Plan-     Informed Consent- Anesthetic plan and risks discussed with patient. I personally reviewed this patient with the CRNA. Discussed and agreed on the Anesthesia Plan with the CRNA. Sana Egan

## 2023-07-07 NOTE — ANESTHESIA POSTPROCEDURE EVALUATION
Post-Op Assessment Note    CV Status:  Stable    Pain management: adequate     Mental Status:  Awake and sleepy   Hydration Status:  Euvolemic   PONV Controlled:  Controlled   Airway Patency:  Patent      Post Op Vitals Reviewed: Yes      Staff: Anesthesiologist, CRNA         There were no known notable events for this encounter.     /67 (07/07/23 1454)    Temp      Pulse 74 (07/07/23 1454)   Resp 16 (07/07/23 1454)    SpO2 95 % (07/07/23 1454)

## 2023-07-11 PROCEDURE — 88305 TISSUE EXAM BY PATHOLOGIST: CPT | Performed by: STUDENT IN AN ORGANIZED HEALTH CARE EDUCATION/TRAINING PROGRAM

## 2023-09-11 DIAGNOSIS — F31.9 BIPOLAR AFFECTIVE DISORDER, REMISSION STATUS UNSPECIFIED (HCC): ICD-10-CM

## 2023-09-11 DIAGNOSIS — F31.70 BIPOLAR DISORDER IN FULL REMISSION, MOST RECENT EPISODE UNSPECIFIED TYPE (HCC): ICD-10-CM

## 2023-09-11 RX ORDER — ZOLPIDEM TARTRATE 12.5 MG/1
TABLET, FILM COATED, EXTENDED RELEASE ORAL
Qty: 10 TABLET | Refills: 1 | Status: SHIPPED | OUTPATIENT
Start: 2023-09-11 | End: 2023-09-19 | Stop reason: SDUPTHER

## 2023-09-11 RX ORDER — LORAZEPAM 0.5 MG/1
TABLET ORAL
Qty: 10 TABLET | Refills: 1 | Status: SHIPPED | OUTPATIENT
Start: 2023-09-11 | End: 2023-09-19 | Stop reason: SDUPTHER

## 2023-09-11 NOTE — TELEPHONE ENCOUNTER
Pt alona Corrales , 1970 , SLPF chart was reviewed.  PDMP reviewed - w/o discrepancies- refilled Zolipidem and Lorazepam 08/13/2023      Sent Zolipidem 12.5mg and Lorazepam 0.5mg to [facility] pharmacy    This note was not shared with the patient due to reasonable likelihood of causing patient harm
Pt requested refill of the lorazepam 0.5, and zolpidem 12.5    Said that he will run out before his appt on the 20th
Awake/Alert

## 2023-09-19 ENCOUNTER — OFFICE VISIT (OUTPATIENT)
Dept: PSYCHIATRY | Facility: CLINIC | Age: 53
End: 2023-09-19
Payer: COMMERCIAL

## 2023-09-19 DIAGNOSIS — F41.1 GENERALIZED ANXIETY DISORDER: Primary | ICD-10-CM

## 2023-09-19 DIAGNOSIS — F31.70 BIPOLAR I DISORDER IN REMISSION (HCC): ICD-10-CM

## 2023-09-19 PROCEDURE — 99214 OFFICE O/P EST MOD 30 MIN: CPT | Performed by: PSYCHIATRY & NEUROLOGY

## 2023-09-19 RX ORDER — ZOLPIDEM TARTRATE 12.5 MG/1
TABLET, FILM COATED, EXTENDED RELEASE ORAL
Qty: 30 TABLET | Refills: 2 | Status: SHIPPED | OUTPATIENT
Start: 2023-09-19

## 2023-09-19 RX ORDER — LAMOTRIGINE 200 MG/1
TABLET ORAL
Qty: 90 TABLET | Refills: 1 | Status: SHIPPED | OUTPATIENT
Start: 2023-09-19

## 2023-09-19 RX ORDER — LORAZEPAM 0.5 MG/1
TABLET ORAL
Qty: 30 TABLET | Refills: 2 | Status: SHIPPED | OUTPATIENT
Start: 2023-09-19

## 2023-09-19 RX ORDER — ESCITALOPRAM OXALATE 10 MG/1
TABLET ORAL
Qty: 30 TABLET | Refills: 1 | Status: SHIPPED | OUTPATIENT
Start: 2023-09-19

## 2023-09-19 RX ORDER — BUPROPION HYDROCHLORIDE 300 MG/1
TABLET ORAL
Qty: 90 TABLET | Refills: 0 | Status: SHIPPED | OUTPATIENT
Start: 2023-09-19

## 2023-09-19 NOTE — PSYCH
[facility]: [address] [facility], [address]    Psychiatric Progress Note  MRN#: [id number]  Fidencio Smiley 47 y.o. male    This note was not shared with the patient due to reasonable likelihood of causing patient harm   ___________________________________________________________________________________________________________________________________  OFFICE APPOINTMENT   Seen today at [address] [place] [address] location                                                Patient Fidencio Smiley ,1970   Prescriber/Physician: Tiffany Johnson DO Physician Location:   [address] [place] [id number]       • This service was provided in the office. Patient is currently located in the [place], where I am  licensed. • Patient gave consent to proceed with encounter; acknowledge understanding of security and privacy of encounter   • Patient identity was verified as well as the [facility] chart   • Patient verbalized understanding evaluation only involves Psychiatric diagnosing, prescribing, result monitoring   • Patient was informed this is a billable service and legal   ___________________________________________________________________________________________________________________________________     Chief Complaint   Patient presents with   • Establish Care     Former pt of Peggy Espinosa     H/o unspecfied Bipolar Disorder, Cannabis abuse  Prescribed Bupropion , Lamotrigine, Zolpidem , Lorazepam     Subjective:  Janeen Chiquito reported history of bipolar disorder and anxiety, also noticed seasonal patterns. Anxiety > Depression ; he described as not relax, stomach irritation, and more edgy in the morning, wake up with agitatation- noticed symptoms for a while . Also acute stressor he's pending lost of employment - lay off was emotional has wife and daughter.   He found out about lost of employment in June 2023. There's negative thinking " never going to get a job", for past couple of month, now improved slightly. Also Stephanie Mcdowell has long history of sleep problem, hence on Zolpidem, w/o leads to mid night awaken , mind racing ( about pending plans, what he has to complete, anticipatory about interviews), causes  difficulties staying  Sleep, and low energy. He's on CPAP, has pending repeat sleep study. Medications: He's compliant to Lorazepam PRN , Zolpidem, Bupropion, and Lamcital- stated he's a lot worst without it, noticed less severe depressive episodes  Med s/e:  denies    ROS:  Depression: if depressed sleep more , not recently   Anxiety: worries, effecting his sleep, energy, anger, also when he's anxious eats more  Sleep:  Defer to HPI  Concentration- problem focusing for hrs, someone told him he may have inattention deficient disorder  Psychosis: denies hallucinations, denies delusion  Luli: high pt ( high energy,excited about things assignment, work, starting tasks and more into his hobbies - race cars, more intense if with disagreement, more confidence)- last occur about 1 yrs ago. Duration wks and not more than 1 month. SI/HI :  H/o Suicidal  Thoughts  2-3 yrs ago, not recently , never had plan     Psychiatric Hx  Medication trials: Citalopram 20mg- s/e lethergy; Hydroxyzine 10mg -s/e sedation  Hospitalizations: Never Hospitalized , denies h/o IOP or  PHP  Violence- yes, punching walls and lashing out or breaking things if he's agitated; last occurred in July 2023- acute stressors, never voilence towards others   SIB/SA- denies    Medical Hx; ISAAK    Family Hx:  Aunt - bipolar   Sister- anxiety   Dad- Alcoholism  Mom- Paranoia    Substance Hx:  Tobacco-   never  Illiicit- last used cannabis about 20 yrs ago   Alcohol- couple times per month 2-3 beers , he previously drink 4-5 .  Denies h/o blackout seizure, DUI    Medical ROS:   Constitutional: negative for chills and fevers  Respiratory: negative for SOB  Cardiovascular: negative for chest pain  Gastrointestinal: negative for constipation, diarrhea, nausea, vomiting and indigestion  Neurological: negative for dizziness and falls,numbness/tingling denies h/o concussions  Behavioral/Psych: positive for anxiety, sleep disturbance  Pertinent items are noted above or in HPI, all other symptoms are negative       Mental Status Evaluation:  General Appearance:  Shira Groves is a 47 y.o.  male age appropriate, casually dressed, adequate hygiene and grooming, looks stated age   Behavior:  pleasant, cooperative, calm, appropriate eye contact   Speech:  normal for rate, rhythm, volume, latency, amount   Mood:  anxious > depression   Affect:  normal to slight tearful depending on content of discussion   Thought Process:  normal and logical   Thought Content:  no overt delusions, normal   Perceptual Disturbances: no auditory hallucinations, no visual hallucinations, Does not appear preoccupied or responding  Delusions  w/o   Risk Potential: Suicidal Ideations  w/o  Homicidal Ideations w/o  Potential for Aggression Yes     Sensorium:  Oriented to person, place, time/date and situation   Memory:  recent and remote memory grossly intact   Consciousness:  alert and awake   Attention: attention span and concentration are age appropriate   Insight:  appropriate   Judgment: appropriate   Gait/Station: normal   Motor Activity: no abnormal movements     There were no vitals filed for this visit.      Medications: Verified during current encounter   Current Outpatient Medications on File Prior to Visit   Medication Sig Dispense Refill   • amoxicillin (AMOXIL) 500 mg capsule      • buPROPion (WELLBUTRIN XL) 300 mg 24 hr tablet Take 1 tablet (300 mg total) by mouth daily 90 tablet 1   • ibuprofen (MOTRIN) 800 mg tablet      • lamoTRIgine (LaMICtal) 200 MG tablet Take 1/2 PO BID 90 tablet 1   • LORazepam (ATIVAN) 0.5 mg tablet Lorazepam 0.5m tablet po daily PRN 10 tablet 1   • zolpidem (AMBIEN CR) 12.5 MG CR tablet Zolpidem Tartrate ER 12.5m tablet po daily at night 10 tablet 1     No current facility-administered medications on file prior to visit. Labs: I have personally reviewed all pertinent laboratory/tests results. Most Recent Labs:   Lab Results   Component Value Date    SODIUM 140 04/15/2023    K 4.3 04/15/2023     04/15/2023    CO2 26 04/15/2023    BUN 15 04/15/2023    CREATININE 1.15 04/15/2023    GLUC 84 04/15/2023    CALCIUM 9.3 04/15/2023    AST 26 04/15/2023    ALT 31 04/15/2023    ALKPHOS 85 04/15/2023    TP 6.3 04/15/2023    ALB 4.7 04/15/2023    TBILI 1.0 04/15/2023    CHOLESTEROL 167 04/15/2023    HDL 38 (L) 04/15/2023    TRIG 98 04/15/2023    LDLCALC 110 (H) 04/15/2023     ________________________________________________________________________    A/P  Adiel Bear a 47 y.o.  male, h/o cannabis abuse and alcoholism, now sober > 10yrs, h/o bipolar disorder, anxiety , who presented with labile mood in the setting of pending lost of employment. Currently with anticipatory anxiety, worries , impacting sleep with prior history of chronic worries. There's maladaptive patterns of lashing out , not recently. Reported of depression, although limited symptoms, deviod of SI. Case complicated; history of nanda x 1 yr ago , now stable while on Lamotrigine , Bupropion , Zolpidem, Lorazepam. Deemed not appropriate for hospitalization. Rating Forms Administered  PHQ9= 11 ( moderate)  MIGUEL= 11 ( moderate)    DSM5  Generalized anxiety disorder  Bipolar I disorder in remission       PLAN:    history, external records was reviewed.  Discussed clinical findings and diagnotic impression generalized anxiety and stable bipolar I disorder   Lexapro 5 to 10mg was started  Did not change other medications  PDMP reviewed- w/o discrepancies 23, 23  Treatment plan was completed 2023; Jaquelin Machado was accepting of treatment plan    Medications Prescribed During SLPF Encounter:  -     escitalopram (Lexapro) 10 mg tablet; Escitalopram 10m/2 tablet po daily x 1 wk. Then increased 1 tablet po daily  -     LORazepam (ATIVAN) 0.5 mg tablet; Lorazepam 0.5m tablet po daily PRN  -     zolpidem (AMBIEN CR) 12.5 MG CR tablet; Zolpidem Tartrate ER 12.5m tablet po daily at night  -     buPROPion (WELLBUTRIN XL) 300 mg 24 hr tablet; Bupropion ER 300m tablet po daily in the morning  -     lamoTRIgine (LaMICtal) 200 MG tablet; Lamotrigine 200m/2 tablet twice daily      Treatement: Risks, benefits, and possible side effects of medications explained to patient and patient verbalizes understanding. [facility]      [facility]ointment@ [facility]  Face To Face: 9:07AM -10:01 AM    Encounter Duration: Time Spent 54mins  with Patient. Greater than 50% of total time was spent with the patient     MDM  Number of Diagnoses or Management Options  Bipolar I disorder in remission [facility]): new, no workup  Generalized anxiety disorder: new, no workup     Amount and/or Complexity of Data Reviewed  Clinical lab tests: reviewed  Review and summarize past medical records: yes    Risk of Complications, Morbidity, and/or Mortality  Presenting problems: moderate  Diagnostic procedures: moderate  Management options: moderate         This note may have been written with the assistance of dictation software.  Please excuse any grammatical  errors, misspellings,  and abnormal spacing of letters , sentences or paragraphs

## 2023-09-19 NOTE — BH CRISIS PLAN
Client Name: Fidencio Smiley       Client YOB: 1970  : 1970     Diagnoses:  Generalized anxiety disorder  Bipolar I disorder in remission    Behavioral Health Team:   [facility]  Psychiatrist Provider  Tiffany Johnson DO  [facility], [address] [facility]  [phone]        Healthcare Provider  [id number], [place]  [address]  [facility] [address]  [phone]    Type of Plan   * Child plans (children 15 yo and younger) must be completed and signed by the child's legal guardian   * Plans for all individuals 15 yo and above must be signed by the client. Plan Type: adolescent/adult (15 and over) Initial      My Personal Strengths are (in the client's own words):  "family- he's  with kids, has reliance , finance degree ? The stressors and triggers that may put me at risk are:  job loss and ongoing anxiety? Coping skills I can use to keep myself calm and safe: ? Coping skills/supports I can use to maintain abstinence from substance use:   sober >10 yrs    The people that provide me with help and support: (Include name, contact, and how they can help)   Support person #1: N? A    * Phone number: N/A    * How can they help N/A      In the past, the following has helped me in times of crisis:    Other: adherence to treatment      If it is an emergency and you need immediate help, call     If there is a possibility of danger to yourself or others, call the following crisis hotline resources:     Adult Crisis Numbers  Suicide Prevention Hotline - Dial   [facility]: [address] [place] [address] Street: [address] [facility] [address]: [address]: [phone]  [address] [place] [address] Street: [phone]  [facility]: [address] [facility] [address]: [address]: [phone] (daytime).        [phone] (after hours, weekends, holidays)     Child/Adolescent Crisis Numbers   [facility]: [address] [facility]: [phone]   [facility]: [phone]   [facility]: [phone]    Please note: Some [facility] do not have a separate number for Child/Adolescent specific crisis. If your county is not listed under Child/Adolescent, please call the adult number for your county     National Talk to Text Line   All Ages - [phone]    In the event your feelings become unmanageable, and you cannot reach your support system, you will call 911 immediately or go to the nearest hospital emergency room.

## 2023-09-19 NOTE — PATIENT INSTRUCTIONS
Nestor Cardozo [id number]   Thanks for presenting to today's Appointment at [facility]  Lexapro 10mg was started  Your  other medications were not changed

## 2023-09-19 NOTE — BH TREATMENT PLAN
TREATMENT PLAN                                                                  [address]          This note was not shared with the patient due to reasonable likelihood of causing patient harm     Name and Date of Birth:  Aicha Alonso 47 y.o. 1970  Date of Treatment Plan: September 19, 2023  Diagnosis/Diagnoses:    1. Generalized anxiety disorder    2. Bipolar I disorder in remission [facility])        Strengths/Personal Resources for Self-Care: Stephanie Mcdowell has familial supports, reliance, ability to reason, general fund of knowledge and, he's educated  Area/Areas of need (in own words): anxiety symptoms, mood instability, improve sleep, lessen worries  1)  Long Term Goal:   •        maintain mood stability; Less anxiety pathology   Reach Goal Date: 1 yr-2 yrs  Person/Persons responsible for completion of goal: Aicha Alonso    2. Short Term Objective (s) - How to reach this goal?:   •         Recommended treatment :  Adherence to antidepressants, antianxiety and mood stabilizer due to diagnoses  •         Routine monitoring of symptom reduction  •         Routine monitoring of labs if necessary   •         CRISIS intervention/Acute Hospitalization if necessary   Reach Goal Date: 6 months- 1yr  Person/Persons Responsible for Completion of Goal: Aicha Alonso    Progress Towards Goals: presents with anxiety symptoms , stable bipolarity   Treatment Modality: routine appointment q 1-3 months at [facility]  Review due 180 days from date of this plan: 6 months - 3/19/2024  Expected length of service: 1-3yrs unless revised      This treatment plan was formulated via my Physician/ Psychiatrist and Lonn Snellen , understand the goals and objectives listed . I agree to work on goals developed for my treatment.

## 2023-09-26 ENCOUNTER — OFFICE VISIT (OUTPATIENT)
Dept: FAMILY MEDICINE CLINIC | Facility: CLINIC | Age: 53
End: 2023-09-26
Payer: COMMERCIAL

## 2023-09-26 VITALS
SYSTOLIC BLOOD PRESSURE: 132 MMHG | WEIGHT: 244 LBS | HEART RATE: 71 BPM | TEMPERATURE: 97 F | OXYGEN SATURATION: 97 % | DIASTOLIC BLOOD PRESSURE: 89 MMHG | BODY MASS INDEX: 35.01 KG/M2

## 2023-09-26 DIAGNOSIS — Z00.00 ANNUAL PHYSICAL EXAM: Primary | ICD-10-CM

## 2023-09-26 PROCEDURE — 99396 PREV VISIT EST AGE 40-64: CPT | Performed by: NURSE PRACTITIONER

## 2023-09-26 RX ORDER — CHLORHEXIDINE GLUCONATE ORAL RINSE 1.2 MG/ML
SOLUTION DENTAL
COMMUNITY
Start: 2023-09-07

## 2023-09-26 NOTE — PROGRESS NOTES
[address] [facility], [address]    NAME: Scot Glass  AGE: 47 y.o. SEX: male  : 1970     DATE: 2023     Assessment and Plan:     Problem List Items Addressed This Visit    None  Visit Diagnoses     Annual physical exam    -  Primary          Immunizations and preventive care screenings were discussed with patient today. Appropriate education was printed on patient's after visit summary. Counseling:  Alcohol/drug use: discussed moderation in alcohol intake, the recommendations for healthy alcohol use, and avoidance of illicit drug use. Dental Health: discussed importance of regular tooth brushing, flossing, and dental visits. Injury prevention: discussed safety/seat belts, safety helmets, smoke detectors, carbon dioxide detectors, and smoking near bedding or upholstery. Sexual health: discussed sexually transmitted diseases, partner selection, use of condoms, avoidance of unintended pregnancy, and contraceptive alternatives. · Exercise: the importance of regular exercise/physical activity was discussed. Recommend exercise 3-5 times per week for at least 30 minutes. BMI Counseling: Body mass index is 35.01 kg/m². The BMI is above normal. Nutrition recommendations include decreasing portion sizes and encouraging healthy choices of fruits and vegetables. Exercise recommendations include exercising 3-5 times per week. Rationale for BMI follow-up plan is due to patient being overweight or obese. No follow-ups on file. Chief Complaint:     Chief Complaint   Patient presents with   • Physical Exam      History of Present Illness:     Adult Annual Physical   Patient here for a comprehensive physical exam. The patient reports no problems. Followed by psych for medication management. Diet and Physical Activity  · Diet/Nutrition: well balanced diet. · Exercise: no formal exercise.       Depression Screening  PHQ-2/9 Depression Screening    Little interest or pleasure in doing things: 0 - not at all  Feeling down, depressed, or hopeless: 0 - not at all  Trouble falling or staying asleep, or sleeping too much: 0 - not at all  Feeling tired or having little energy: 0 - not at all  Poor appetite or overeatin - not at all  Feeling bad about yourself - or that you are a failure or have let yourself or your family down: 0 - not at all  Trouble concentrating on things, such as reading the newspaper or watching television: 0 - not at all  Moving or speaking so slowly that other people could have noticed. Or the opposite - being so fidgety or restless that you have been moving around a lot more than usual: 0 - not at all  Thoughts that you would be better off dead, or of hurting yourself in some way: 0 - not at all  PHQ-2 Score: 0  PHQ-2 Interpretation: Negative depression screen  PHQ-9 Score: 0   PHQ-9 Interpretation: No or Minimal depression        General Health  · Sleep: sleeps well. · Hearing: normal - bilateral.  · Vision: goes for regular eye exams. · Dental: regular dental visits.  Health  · Symptoms include: none     Review of Systems:     Review of Systems   Constitutional: Negative for activity change, appetite change, chills, diaphoresis, fatigue, fever and unexpected weight change. HENT: Negative for congestion, dental problem, drooling, ear discharge, ear pain, facial swelling, hearing loss, mouth sores, nosebleeds, postnasal drip, rhinorrhea, sinus pressure, sinus pain, sneezing, sore throat, tinnitus, trouble swallowing and voice change. Eyes: Negative for photophobia, pain, discharge, redness, itching and visual disturbance. Respiratory: Negative for apnea, cough, choking, chest tightness, shortness of breath, wheezing and stridor. Cardiovascular: Negative for chest pain, palpitations and leg swelling.    Gastrointestinal: Negative for abdominal distention, abdominal pain, anal bleeding, blood in stool, constipation, diarrhea, nausea, rectal pain and vomiting. Endocrine: Negative for cold intolerance, heat intolerance, polydipsia, polyphagia and polyuria. Genitourinary: Negative for decreased urine volume, difficulty urinating, dysuria, enuresis, flank pain, frequency, genital sores, hematuria, penile discharge, penile pain, penile swelling, scrotal swelling, testicular pain and urgency. Musculoskeletal: Negative for arthralgias, back pain, gait problem, joint swelling, myalgias, neck pain and neck stiffness. Skin: Negative for color change, pallor, rash and wound. Neurological: Negative for dizziness, tremors, seizures, syncope, facial asymmetry, speech difficulty, weakness, light-headedness, numbness and headaches. Hematological: Negative for adenopathy. Does not bruise/bleed easily. Psychiatric/Behavioral: Negative for agitation, behavioral problems, confusion, decreased concentration, dysphoric mood, hallucinations, self-injury, sleep disturbance and suicidal ideas. The patient is not nervous/anxious and is not hyperactive. Past Medical History:     Past Medical History:   Diagnosis Date   • Bipolar 1 disorder ([address] )    • Moderate cannabis use disorder ([address]) 12/05/2022   • Sleep apnea     pt uses CPAP      Past Surgical History:     Past Surgical History:   Procedure Laterality Date   • CHOLECYSTECTOMY     • COLONOSCOPY     • HERNIA REPAIR        Family History:     Family History   Problem Relation Age of Onset   • Diabetes Mother       Social History:     Social History     Socioeconomic History   • Marital status: /Civil Union     Spouse name: None   • Number of children: None   • Years of education: None   • Highest education level: None   Occupational History   • None   Tobacco Use   • Smoking status: Never   • Smokeless tobacco: Never   Vaping Use   • Vaping Use: Never used   Substance and Sexual Activity   • Alcohol use:  Yes   • Drug use: No   • Sexual activity: None   Other Topics Concern   • None   Social History Narrative   • None     Social Determinants of Health     Financial Resource Strain: Not on file   Food Insecurity: Not on file   Transportation Needs: Not on file   Physical Activity: Not on file   Stress: Not on file   Social Connections: Not on file   Intimate Partner Violence: Not on file   Housing Stability: Not on file      Current Medications:     Current Outpatient Medications   Medication Sig Dispense Refill   • buPROPion (WELLBUTRIN XL) 300 mg 24 hr tablet Bupropion ER 300m tablet po daily in the morning 90 tablet 0   • chlorhexidine (PERIDEX) 0.12 % solution RINSE WITH 1/2 OUNCE TWICE A DAY AFTER BREAKFAST AND BEDTIME. SPIT DO NOT SWALLOW     • escitalopram (Lexapro) 10 mg tablet Escitalopram 10m/2 tablet po daily x 1 wk. Then increased 1 tablet po daily 30 tablet 1   • lamoTRIgine (LaMICtal) 200 MG tablet Lamotrigine 200m/2 tablet twice daily 90 tablet 1   • LORazepam (ATIVAN) 0.5 mg tablet Lorazepam 0.5m tablet po daily PRN 30 tablet 2   • zolpidem (AMBIEN CR) 12.5 MG CR tablet Zolpidem Tartrate ER 12.5m tablet po daily at night 30 tablet 2     No current facility-administered medications for this visit. Allergies:     No Known Allergies   Physical Exam:     /89 (BP Location: Left arm, Patient Position: Sitting, Cuff Size: Standard)   Pulse 71   Temp (!) 97 °F (36.1 °C) (Tympanic)   Wt 111 kg (244 lb)   SpO2 97%   BMI 35.01 kg/m²     Physical Exam  Vitals and nursing note reviewed. Constitutional:       General: He is not in acute distress. Appearance: Normal appearance. He is well-developed. He is obese. He is not ill-appearing, toxic-appearing or diaphoretic. HENT:      Head: Normocephalic and atraumatic. Right Ear: Tympanic membrane, ear canal and external ear normal. There is no impacted cerumen.       Left Ear: Tympanic membrane, ear canal and external ear normal. There is no impacted cerumen. Nose: Nose normal. No congestion or rhinorrhea. Mouth/Throat:      Mouth: Mucous membranes are moist.      Pharynx: Oropharynx is clear. No oropharyngeal exudate or posterior oropharyngeal erythema. Eyes:      General:         Right eye: No discharge. Left eye: No discharge. Extraocular Movements: Extraocular movements intact. Conjunctiva/sclera: Conjunctivae normal.      Pupils: Pupils are equal, round, and reactive to light. Neck:      Vascular: No carotid bruit. Cardiovascular:      Rate and Rhythm: Normal rate and regular rhythm. Heart sounds: Normal heart sounds. No murmur heard. No friction rub. No gallop. Pulmonary:      Effort: Pulmonary effort is normal. No respiratory distress. Breath sounds: Normal breath sounds. No stridor. No wheezing, rhonchi or rales. Chest:      Chest wall: No tenderness. Abdominal:      General: Bowel sounds are normal. There is no distension. Palpations: Abdomen is soft. There is no mass. Tenderness: There is no abdominal tenderness. There is no right CVA tenderness, left CVA tenderness, guarding or rebound. Hernia: No hernia is present. Musculoskeletal:         General: No swelling, tenderness, deformity or signs of injury. Normal range of motion. Cervical back: Normal range of motion and neck supple. No rigidity or tenderness. Right lower leg: No edema. Left lower leg: No edema. Lymphadenopathy:      Cervical: No cervical adenopathy. Skin:     General: Skin is warm and dry. Capillary Refill: Capillary refill takes less than 2 seconds. Coloration: Skin is not jaundiced or pale. Findings: No bruising, erythema, lesion or rash. Neurological:      General: No focal deficit present. Mental Status: He is alert and oriented to person, place, and time. Cranial Nerves: No cranial nerve deficit. Sensory: No sensory deficit. Motor: No weakness. Coordination: Coordination normal.      Gait: Gait normal.      Deep Tendon Reflexes: Reflexes normal.   Psychiatric:         Mood and Affect: Mood normal.         Behavior: Behavior normal.         Thought Content:  Thought content normal.         Judgment: Judgment normal.          Teri Mascorro, [address]

## 2023-10-13 NOTE — PATIENT INSTRUCTIONS
Luis Carlos Serna [id number]   Thanks for presenting to today's Appointment at [facility]  Your medications were not changed

## 2023-10-19 ENCOUNTER — OFFICE VISIT (OUTPATIENT)
Dept: PSYCHIATRY | Facility: CLINIC | Age: 53
End: 2023-10-19
Payer: COMMERCIAL

## 2023-10-19 DIAGNOSIS — F41.1 GENERALIZED ANXIETY DISORDER: Primary | ICD-10-CM

## 2023-10-19 DIAGNOSIS — F31.70 BIPOLAR I DISORDER IN REMISSION (HCC): ICD-10-CM

## 2023-10-19 PROCEDURE — 99212 OFFICE O/P EST SF 10 MIN: CPT | Performed by: PSYCHIATRY & NEUROLOGY

## 2023-10-25 ENCOUNTER — CONSULT (OUTPATIENT)
Age: 53
End: 2023-10-25
Payer: COMMERCIAL

## 2023-10-25 VITALS
HEART RATE: 92 BPM | OXYGEN SATURATION: 98 % | BODY MASS INDEX: 34.65 KG/M2 | DIASTOLIC BLOOD PRESSURE: 90 MMHG | WEIGHT: 242 LBS | HEIGHT: 70 IN | SYSTOLIC BLOOD PRESSURE: 120 MMHG

## 2023-10-25 DIAGNOSIS — G47.33 OBSTRUCTIVE SLEEP APNEA SYNDROME: Primary | ICD-10-CM

## 2023-10-25 DIAGNOSIS — G47.09 OTHER INSOMNIA: ICD-10-CM

## 2023-10-25 DIAGNOSIS — E66.9 OBESITY (BMI 30.0-34.9): ICD-10-CM

## 2023-10-25 PROCEDURE — 99204 OFFICE O/P NEW MOD 45 MIN: CPT | Performed by: INTERNAL MEDICINE

## 2023-10-25 NOTE — PROGRESS NOTES
Sleep Consultation   Javier George 47 y.o. male MRN: [id number]      Reason for consultation: ISAAK    Requesting physician: Dr. Manas Melton    Assessment/Plan  1. Obstructive sleep apnea syndrome  Assessment & Plan: Moderate ISAAK diagnosed in 2014 with AHI of 17  Has an old CPAP machine from [facility], replaced through the recall process  His new machine is set at 4-20 auto titrating  I ordered new supplies for him today  He has mild persistent sleepiness during the day likely secondary to his medications he is on Lamictal, Ativan as well as Ambien CR at night 12.5 mg which could have residual effects during the day    Orders:  -     PAP DME Resupply/Reorder    2. Obesity (BMI 30.0-34. 9)  Assessment & Plan:  BMI of 34.72 noted      3. Other insomnia  Assessment & Plan: On chronic Ambien therapy managed by PCP  I discussed with him on the side effects including but not limited to parasomnias; sleepwalking, eating or accidents, he denies all of his symptoms currently            History of Present Illness   HPI:  Javier George is a 47 y.o. male with PMHx as below who comes in for evaluation of obstructive sleep apnea the patient was diagnosed in 2014 with history of snoring as well as excessive daytime sleepiness. 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 His current sleep routine is going to bed at 10 PM.  In half an hour wakes up at 7 AM he has 2-3 awakenings overnight. Sometimes he feels sleepy during the days in the afternoon he will take 1 hour nap. Of note, he is on many medications for other psychiatric disorders as detailed above. He reports history of snoring however that is not the case while he is using his CPAP. Denies drinking coffee or caffeinated energy drinks. Denies tobacco alcohol or drug abuse. He is [place] scale is as detailed below.     Sitting and reading: Would never doze  Watching TV: Slight chance of dozing  Sitting, inactive in a public place (e.g. a theatre or a meeting): Would never doze  As a passenger in a car for an hour without a break: Slight chance of dozing  Lying down to rest in the afternoon when circumstances permit: High chance of dozing  Sitting and talking to someone: Would never doze  Sitting quietly after a lunch without alcohol: Would never doze  In a car, while stopped for a few minutes in traffic: Would never doze  Total score: 5            Historical Information   Past Medical History:   Diagnosis Date    Bipolar 1 disorder ([address] [facility])     Moderate cannabis use disorder ([address] [facility]) 2022    Sleep apnea     pt uses CPAP     Past Surgical History:   Procedure Laterality Date    CHOLECYSTECTOMY      COLONOSCOPY      HERNIA REPAIR       Family History   Problem Relation Age of Onset    Diabetes Mother      Social History     Socioeconomic History    Marital status: /Civil Union     Spouse name: Not on file    Number of children: Not on file    Years of education: Not on file    Highest education level: Not on file   Occupational History    Not on file   Tobacco Use    Smoking status: Never    Smokeless tobacco: Never   Vaping Use    Vaping Use: Never used   Substance and Sexual Activity    Alcohol use: Yes    Drug use: No    Sexual activity: Not on file   Other Topics Concern    Not on file   Social History Narrative    Not on file     Social Determinants of Health     Financial Resource Strain: Not on file   Food Insecurity: Not on file   Transportation Needs: Not on file   Physical Activity: Not on file   Stress: Not on file   Social Connections: Not on file   Intimate Partner Violence: Not on file   Housing Stability: Not on file       Occupational History: Unemployed currently     Meds/Allergies   No Known Allergies    Home medications:  Prior to Admission medications    Medication Sig Start Date End Date Taking?  Authorizing Provider   buPROPion (WELLBUTRIN XL) 300 mg 24 hr tablet Bupropion ER 300m tablet po daily in the morning 23  Yes Nydia Wilde,    escitalopram (Lexapro) 10 mg tablet Escitalopram 10m/2 tablet po daily x 1 wk. Then increased 1 tablet po daily 23  Yes Nydia Wilde DO   lamoTRIgine (LaMICtal) 200 MG tablet Lamotrigine 200m/2 tablet twice daily 23  Yes yNdia Wilde DO   LORazepam (ATIVAN) 0.5 mg tablet Lorazepam 0.5m tablet po daily PRN 23  Yes Nydia Wilde DO   zolpidem (AMBIEN CR) 12.5 MG CR tablet Zolpidem Tartrate ER 12.5m tablet po daily at night 23  Yes Nydia Wilde DO   chlorhexidine (PERIDEX) 0.12 % solution RINSE WITH 1/2 OUNCE TWICE A DAY AFTER BREAKFAST AND BEDTIME. SPIT DO NOT SWALLOW 23   Historical Provider, MD       Vitals:   Blood pressure 120/90, pulse 92, height 5' 10" (1.778 m), weight 110 kg (242 lb), SpO2 98 %. ,  Body mass index is 34.72 kg/m². Neck Circumference: 16.5    Physical Exam  General:  Awake alert and oriented x 3, conversant without conversational dyspnea, NAD, normal affect  HEENT:   Sclera noninjected, nonicteric OU, Nares patent,  no craniofacial abnormalities, Mucous membranes, moist, no oral lesions, normal dentition  NECK:  Trachea midline, no accessory muscle use, no stridor,  JVP not elevated  CARDIAC: Reg, single s1/S2, no m/r/g  PULM: CTA bilaterally no wheezing, rhonchi or rales  EXT: No cyanosis, no clubbing, no edema  NEURO: no focal neurologic deficits, AAOx3, moving all extremities appropriately    Labs: I have personally reviewed pertinent lab results. , ABG: No results found for: "PHART", "VNR9WLX", "PO2ART", "XXN5KKL", "Z7DWSQBZ", "BEART", "SOURCE", BNP: No results found for: "BNP", CBC: No results found for: "WBC", "HGB", "HCT", "MCV", "PLT", "ADJUSTEDWBC", "RBC", "MCH", "MCHC", "RDW", "MPV", "NRBC", CMP: No results found for: "SODIUM", "K", "CL", "CO2", "ANIONGAP", "BUN", "CREATININE", "GLUCOSE", "CALCIUM", "AST", "ALT", "ALKPHOS", "PROT", "BILITOT", "EGFR", PT/INR: No results found for: "PT", "INR", Troponin: No results found for: "TROPONINI"  No results found for: "WBC", "HGB", "HCT", "MCV", "PLT"   Lab Results   Component Value Date    CALCIUM 9.3 04/15/2023    K 4.3 04/15/2023    CO2 26 04/15/2023     04/15/2023    BUN 15 04/15/2023    CREATININE 1.15 04/15/2023     No results found for: "IRON", "TIBC", "FERRITIN"  No results found for: "Namon Corby"  No results found for: "FOLATE"      Sleep studies:  Diagnostic: AHI 17 events/hr --> moderate ISAAK            Dorothy Ramon MD  [facility]       Portions of the record may have been created with voice recognition software. Occasional wrong word or "sound a like" substitutions may have occurred due to the inherent limitations of voice recognition software. Read the chart carefully and recognize, using context, where substitutions have occurred.

## 2023-10-25 NOTE — ASSESSMENT & PLAN NOTE
Moderate ISAAK diagnosed in 2014 with AHI of 17  Has an old CPAP machine from [place], replaced through the recall process  His new machine is set at 4-20 auto titrating  I ordered new supplies for him today  He has mild persistent sleepiness during the day likely secondary to his medications he is on Lamictal, Ativan as well as Ambien CR at night 12.5 mg which could have residual effects during the day

## 2023-10-25 NOTE — ASSESSMENT & PLAN NOTE
On chronic Ambien therapy managed by PCP  I discussed with him on the side effects including but not limited to parasomnias; sleepwalking, eating or accidents, he denies all of his symptoms currently No

## 2023-10-25 NOTE — PATIENT INSTRUCTIONS
HOW TO CLEAN YOUR AUTO CPAP MACHINE    Mask: Clean the cushion of your mask daily. Dish soap and warm water.    [employer] eligible for mask cushions every 3 months)    Headgear: Once a week. I find when you wash it daily it eats the material of the Velcro faster.    (eligible for new headgear every 6 months)    Heated Tubing: Clean the tube every 3 days. One drop of dish soap run warm water through the tube then hang it over your shower curtain and let it drip dry. (eligible every 3 months)    Humidifier: Rinse out every 1-3 days. Dish soap warm water or , top shelf. (eligible every 6 months) **DISTILLED WATER ONLY**    Filters:    Dark blue (reusable for 6 months)- Rinse under warm water (no soap) sit and drip dry every 2-3 weeks. (eligible for a new one every 6 months)    Light Blue (disposable) throw away every 2-3weeks depending on how dirty it looks.  (eligible for 6 every 3 months)

## 2023-10-26 LAB

## 2023-11-07 LAB
DME PARACHUTE DELIVERY DATE ACTUAL: NORMAL
DME PARACHUTE DELIVERY DATE REQUESTED: NORMAL
DME PARACHUTE ITEM DESCRIPTION: NORMAL
DME PARACHUTE ORDER STATUS: NORMAL
DME PARACHUTE SUPPLIER NAME: NORMAL
DME PARACHUTE SUPPLIER PHONE: NORMAL

## 2023-11-30 ENCOUNTER — OFFICE VISIT (OUTPATIENT)
Dept: PSYCHIATRY | Facility: CLINIC | Age: 53
End: 2023-11-30
Payer: COMMERCIAL

## 2023-11-30 DIAGNOSIS — F31.70 BIPOLAR I DISORDER IN REMISSION (HCC): ICD-10-CM

## 2023-11-30 DIAGNOSIS — F41.1 GENERALIZED ANXIETY DISORDER: Primary | ICD-10-CM

## 2023-11-30 DIAGNOSIS — G47.00 INSOMNIA, UNSPECIFIED TYPE: ICD-10-CM

## 2023-11-30 PROCEDURE — 99213 OFFICE O/P EST LOW 20 MIN: CPT | Performed by: PSYCHIATRY & NEUROLOGY

## 2023-11-30 RX ORDER — LAMOTRIGINE 200 MG/1
TABLET ORAL
Qty: 90 TABLET | Refills: 0 | Status: SHIPPED | OUTPATIENT
Start: 2023-11-30

## 2023-11-30 RX ORDER — ZOLPIDEM TARTRATE 12.5 MG/1
TABLET, FILM COATED, EXTENDED RELEASE ORAL
Qty: 30 TABLET | Refills: 2 | Status: SHIPPED | OUTPATIENT
Start: 2023-11-30

## 2023-11-30 RX ORDER — LORAZEPAM 0.5 MG/1
TABLET ORAL
Qty: 30 TABLET | Refills: 1 | Status: SHIPPED | OUTPATIENT
Start: 2023-11-30

## 2023-11-30 RX ORDER — ESCITALOPRAM OXALATE 10 MG/1
TABLET ORAL
Qty: 90 TABLET | Refills: 0 | Status: SHIPPED | OUTPATIENT
Start: 2023-11-30

## 2023-11-30 RX ORDER — BUPROPION HYDROCHLORIDE 300 MG/1
TABLET ORAL
Qty: 90 TABLET | Refills: 0 | Status: SHIPPED | OUTPATIENT
Start: 2023-11-30

## 2023-11-30 NOTE — PSYCH
[facility]/Hospital: [id number] [facility], [address]    Psychiatric Progress Note  MRN#: [id number]  Britney Tim 47 y.o. male    This note was not shared with the patient due to reasonable likelihood of causing patient harm   ___________________________________________________________________________________________________________________________________  OFFICE APPOINTMENT   [facility]                                                Patient Britney Tim ,1970   Prescriber/Physician: Roxanne Alonzo DO Physician Location:   [address] [place] [id number]       This service was provided in the office. Patient is currently located in the [place], where I am  licensed. Patient gave consent to proceed with encounter; acknowledge understanding of security and privacy of encounter   Patient identity was [facility] well [facility]   Patient verbalized understanding evaluation only involves Psychiatric diagnosing, prescribing, result monitoring   Patient was informed this is a billable service and legal   ___________________________________________________________________________________________________________________________________     Reason For Visit:  Chief Complaint   Patient presents with    Anxiety       Subjective:     He reported thinking Lexapro is working , without morning time anxiety, if occurs it's  relieved with Lorazapem. Otherwise, Britney Tim complained of sleep problems, either cause of nocturia , then worries/ thoughts , total sleep duration - at worst  5-6 hrs of sleep,; some night more hrs . In general, he's  w/o  problem falling to sleep, Zolpidem works , although he wonders if its always necessary. He has history of sleep apnea and has new machine.     Britney Tim  reported on positives - he got a job offer - he's excited. Reported holiday was fairly decent also . ROS:  SI/HI : denies   Depression:  denies  Irritiable : denies   Anxiety - defer to above  Sleep: defer to above   Luli:   without high highs        Medications: He's compliant to Lexapro, Lorazepam 0.5 PRN , Zolpidem CR 12.5, Bupropion 300mg, and Lamictal 200mg,   Med s/e:  denies      Tobacco Use: Low Risk  (10/25/2023)    Patient History     Smoking Tobacco Use: Never     Smokeless Tobacco Use: Never     Passive Exposure: Not on file      Medical ROS:   Respiratory: negative for wheezing and SOB  Cardiovascular: negative for chest pain and palpitations  Neurological: negative for dizziness and tremors   Pertinent items are noted in HPI, all other symptoms are negative                   Mental Status Evaluation:  General Appearance:  Guille Ahuja is a 47 y.o.  male age appropriate, casually dressed,  looks stated age   Behavior:  Normal    Speech:  normal for rate, rhythm, volume, latency, amount   Mood:  Less nervous   Affect:  Mood congruent    Thought Process:  normal and logical   Thought Content:  no overt delusions, normal   Perceptual Disturbances: Does not appear preoccupied or responding  Delusions  w/o   Risk Potential: Suicidal Ideations  w/o  Homicidal Ideations w/o  Potential for Aggression Yes  , h/o    Sensorium:  Oriented to person, place [facility]), time/date (November 2023) and situation   Memory:  recent and remote memory grossly intact   Consciousness:  alert and awake   Attention: attention span and concentration are age appropriate   Insight:  appropriate   Judgment: appropriate   Gait/Station: normal   Motor Activity: no abnormal movements     There were no vitals filed for this visit.      Medications: Verified during current encounter   Current Outpatient Medications on File Prior to Visit   Medication Sig Dispense Refill    buPROPion (WELLBUTRIN XL) 300 mg 24 hr tablet Bupropion ER 300m tablet po daily in the morning 90 tablet 0    chlorhexidine (PERIDEX) 0.12 % solution RINSE WITH 1/2 OUNCE TWICE A DAY AFTER BREAKFAST AND BEDTIME. SPIT DO NOT SWALLOW      escitalopram (Lexapro) 10 mg tablet Escitalopram 10m/2 tablet po daily x 1 wk. Then increased 1 tablet po daily 30 tablet 1    lamoTRIgine (LaMICtal) 200 MG tablet Lamotrigine 200m/2 tablet twice daily 90 tablet 1    LORazepam (ATIVAN) 0.5 mg tablet Lorazepam 0.5m tablet po daily PRN 30 tablet 2    zolpidem (AMBIEN CR) 12.5 MG CR tablet Zolpidem Tartrate ER 12.5m tablet po daily at night 30 tablet 2     No current facility-administered medications on file prior to visit. Labs: I have personally reviewed all pertinent laboratory/tests results. Most Recent Labs:   Lab Results   Component Value Date    SODIUM 140 04/15/2023    K 4.3 04/15/2023     04/15/2023    CO2 26 04/15/2023    BUN 15 04/15/2023    CREATININE 1.15 04/15/2023    GLUC 84 04/15/2023    CALCIUM 9.3 04/15/2023    AST 26 04/15/2023    ALT 31 04/15/2023    ALKPHOS 85 04/15/2023    TP 6.3 04/15/2023    ALB 4.7 04/15/2023    TBILI 1.0 04/15/2023    CHOLESTEROL 167 04/15/2023    HDL 38 (L) 04/15/2023    TRIG 98 04/15/2023    LDLCALC 110 (H) 04/15/2023     MIGUEL-7 Flowsheet Screening      Flowsheet Row Most Recent Value   Over the last 2 weeks, how often have you been bothered by any of the following problems? Feeling nervous, anxious, or on edge 1   Not being able to stop or control worrying 0   Worrying too much about different things 0   Trouble relaxing 0   Being so restless that it is hard to sit still 1   Becoming easily annoyed or irritable 0   Feeling afraid as if something awful might happen 0   MIGUEL-7 Total Score 2           ________________________________________________________________________    A/P  Jag Pham a 47 y.o.   male, h/o cannabis abuse and alcoholism, now sober > 10yrs, h/o bipolar disorder, anxiety , who presented with labile mood in the setting situational stressor; with findings of  anticipatory anxiety, worries . Improvement since starting Lexapro 10mg ~ 8 wks ago. Case complicated as there reports of low sleep , likely multi factorial anxiety, nocturia , sleep apnea ( recently started new CPAP)? Rating Forms Administered  MIGUEL= 11 ( moderate)---> 2     DSM5  1. Generalized anxiety disorder    2. Bipolar I disorder in remission ([address])    3. Insomnia, unspecified type            PLAN:    History, external records was reviewed. Discussed clinical findings ,diagnotic impression:  less anxiety , with sleep disturbance  Keesha Mobley was encouraged to try Lamotrigine in the morning and not a night   Did not change other medications  PDMP reviewed- w/o discrepancies 10/28/2023 Lorazepam , has 1 refill, 2023 Zolpidem       Medications Prescribed During [facility] Encounter   -     LORazepam (ATIVAN) 0.5 mg tablet; Lorazepam 0.5m tablet po daily PRN  -     zolpidem (AMBIEN CR) 12.5 MG CR tablet; Zolpidem Tartrate ER 12.5m tablet po daily at night  -     escitalopram (Lexapro) 10 mg tablet; Escitalopram 10m tablet po daily  -     lamoTRIgine (LaMICtal) 200 MG tablet; Lamotrigine 200m tablet po daily  -     buPROPion (WELLBUTRIN XL) 300 mg 24 hr tablet; Bupropion ER 300m tablet po daily in the morning             Treatement: Risks, benefits, and possible side effects of medications explained to patient and patient verbalizes understanding. Next Appt @ [facility]: 3 months       [facility]  Face To Face: 9:06- 9:42    Encounter Duration: Time Spent 35 min with Patient. Greater than 50% of total time was spent with the patient     MDM  Number of Diagnoses or Management Options  Diagnosis management comments: 3       Amount and/or Complexity of Data Reviewed  Review and summarize past medical records: yes    Risk of Complications, Morbidity, and/or Mortality  Presenting problems: low  Diagnostic procedures: moderate  Management options: moderate           This note may have been written with the assistance of dictation software. Please excuse any grammatical  errors, misspellings,  and abnormal spacing of letters , sentences or paragraphs .  For accurate interpretation should read note horizontally

## 2023-11-30 NOTE — PATIENT INSTRUCTIONS
Tim Bae [id number]   Thanks for presenting to today's Appointment at [facility]  Try Lamotrigine in the morning and not at night.    Your medications were not changed

## 2024-02-14 ENCOUNTER — TELEPHONE (OUTPATIENT)
Dept: PSYCHIATRY | Facility: CLINIC | Age: 54
End: 2024-02-14

## 2024-02-14 NOTE — TELEPHONE ENCOUNTER
LVM regarding inactive ins. Informed client to call back with updated ins information.   (3) adequate

## 2024-02-21 ENCOUNTER — OFFICE VISIT (OUTPATIENT)
Dept: PSYCHIATRY | Facility: CLINIC | Age: 54
End: 2024-02-21
Payer: COMMERCIAL

## 2024-02-21 DIAGNOSIS — Z79.899 ENCOUNTER FOR LONG-TERM (CURRENT) USE OF OTHER MEDICATIONS: ICD-10-CM

## 2024-02-21 DIAGNOSIS — F31.11 BIPOLAR AFFECTIVE DISORDER, CURRENTLY MANIC, MILD (HCC): Primary | ICD-10-CM

## 2024-02-21 DIAGNOSIS — G47.00 INSOMNIA, UNSPECIFIED TYPE: ICD-10-CM

## 2024-02-21 DIAGNOSIS — F41.1 GENERALIZED ANXIETY DISORDER: ICD-10-CM

## 2024-02-21 PROCEDURE — 99214 OFFICE O/P EST MOD 30 MIN: CPT | Performed by: PSYCHIATRY & NEUROLOGY

## 2024-02-21 RX ORDER — ARIPIPRAZOLE 5 MG/1
TABLET ORAL
Qty: 30 TABLET | Refills: 1 | Status: SHIPPED | OUTPATIENT
Start: 2024-02-21

## 2024-02-21 NOTE — PSYCH
[facility]/Hospital: [facility]   [address] [id number] [phone]    Psychiatric Progress Note  MRN#: [id number]  Francois Corrales 53 y.o. male    This note was not shared with the patient due to reasonable likelihood of causing patient harm   ___________________________________________________________________________________________________________________________________  OFFICE APPOINTMENT   Seen today at [facility] location                                                Patient Francois Corrales ,1970   Prescriber/Physician: Nydia Wlide DO Physician Location:   [facility] OUTPATIENT  [address] [facility] [id number]       This service was provided in the office.  Patient is currently located in the [place], where I am  licensed.   Patient gave consent to proceed with encounter; acknowledge understanding of security and privacy of encounter   Patient identity [facility]   Patient verbalized understanding evaluation only involves Psychiatric diagnosing, prescribing, result monitoring   Patient was informed this is a billable service and legal   ___________________________________________________________________________________________________________________________________     Reason For Visit:  Chief Complaint   Patient presents with    Manic Behavior       Subjective:   He reported on improvements with mood , otherwise complained of weight gain and thinks that's cause of Lexapro.  Also low libido and increase hunger as he gain 15lb ( weight 250mg) , he noticed around late summer and fall, history of that occurring in fall and winter. During then mood was depressed . Now not depressed . History of him heavy previously but typically lessen to his normal of 225lb , height 5'11    Francois Corrales described his diet as normal ( breakfest , lunch and  dinner) , he acknowleged eating too much although not hunger occur 3-4 x wkly , bingeing snacking  a lot at night ( snacks were reported as sugary treats, yogurt , almonds , Jacob reported 500 calories  cause of bingeing, denies hiding food, although 2-3 x he eats out as fast food.  Denies puring , laxative abuse , > exerciseing , abuse of diet pilld, abuse of stimulants .  Breakfast :oatmeal, unch cold cut sandwich, dinner ( cassarola, grilled chicken and veggies    ROS:  SI/HI : denies   Depression:  denies  Luli:   Francois reported recent high high mood , higher than normal, more positive about his life, increase energy on the wkend and getting stuff completed, creat a list of things (working around the house, bills) , Also work wk is productive as he;s able to get a lot things done and also reported hearing positive feed back for his employers; also waking up in middle of night cause of high energy, then he's able to return to sleep. He reported onset of luli x 2 months   Irritiable : denies   Anxiety - defer to above  Alcohol- denies recent drinking or abuse            Medications: He's compliant to Lexapro, Lorazepam 0.5 PRN , Zolpidem CR 12.5, Bupropion 300mg, and Lamictal 200mg,   Med s/e:  denies    Medical ROS:   Respiratory: negative for Shortness of breath or apnai attack ,he has new CPAP  Gastrointestinal: negative for constipation, diarrhea, nausea, and vomiting  Neurological: negative for dizziness and head trauma .   Pertinent items are noted in HPI, all other symptoms are negative         Mental Status Evaluation:  General Appearance:  Francois Corrales is a 53 y.o.  male age appropriate, casually dressed,  looks stated age   Behavior:  Cooperative, mildly hyper, appropriate eye contact    Speech:  normal for rate, rhythm, volume, latency, amount, slightly talkative    Mood:  Normal to happy   Affect:  Normal    Thought Process:  normal and logical   Thought Content:  no overt delusions,  normal   Perceptual Disturbances: Does not appear preoccupied or responding  Delusions  w/o   Risk Potential: Suicidal Ideations  w/o  Homicidal Ideations w/o  Potential for Aggression Yes  , h/o    Sensorium:  Oriented to person, place ( [facility]), time/date (2024) and situation   Memory:  recent and remote memory grossly intact   Consciousness:  alert and awake   Attention: attention span and concentration are appropriate   Insight:  appropriate   Judgment: appropriate   Gait/Station: normal   Motor Activity: no abnormal movements     There were no vitals filed for this visit.     Medications: Verified during current encounter   Current Outpatient Medications on File Prior to Visit   Medication Sig Dispense Refill    buPROPion (WELLBUTRIN XL) 300 mg 24 hr tablet Bupropion ER 300m tablet po daily in the morning 90 tablet 0    chlorhexidine (PERIDEX) 0.12 % solution RINSE WITH 1/2 OUNCE TWICE A DAY AFTER BREAKFAST AND BEDTIME. SPIT DO NOT SWALLOW      escitalopram (Lexapro) 10 mg tablet Escitalopram 10m tablet po daily 90 tablet 0    lamoTRIgine (LaMICtal) 200 MG tablet Lamotrigine 200m tablet po daily 90 tablet 0    LORazepam (ATIVAN) 0.5 mg tablet Lorazepam 0.5m tablet po daily PRN 30 tablet 1    zolpidem (AMBIEN CR) 12.5 MG CR tablet Zolpidem Tartrate ER 12.5m tablet po daily at night 30 tablet 2     No current facility-administered medications on file prior to visit.        Labs: I have personally reviewed all pertinent laboratory/tests results.   Most Recent Labs:   Lab Results   Component Value Date    SODIUM 140 04/15/2023    K 4.3 04/15/2023     04/15/2023    CO2 26 04/15/2023    BUN 15 04/15/2023    CREATININE 1.15 04/15/2023    GLUC 84 04/15/2023    CALCIUM 9.3 04/15/2023    AST 26 04/15/2023    ALT 31 04/15/2023    ALKPHOS 85 04/15/2023    TP 6.3 04/15/2023    ALB 4.7 04/15/2023    TBILI 1.0 04/15/2023    CHOLESTEROL 167 04/15/2023    HDL 38 (L) 04/15/2023    TRIG 98  04/15/2023    LDLCALC 110 (H) 04/15/2023          ________________________________________________________________________    A/P  Francois Corrales,is a 53 y.o.  male, h/o cannabis abuse and alcoholism, now sober > 10yrs, h/o bipolar disorder, anxiety , who presented with labile mood in the setting situational stressor; with findings of  anticipatory anxiety, worries , Positive response to Lexapro. Currently with finding of nanda ( high mood, productivity, increase tasks, high energy and lack of sleep) . Also, with reports of increased eating at night , likely cause of seasonal changes as he has history of weight increased in fall winter> bipolarity impulsivity       DSM5  1. Generalized anxiety disorder    2. Bipolar I disorder in remission (HCC)    3. Insomnia, unspecified type            PLAN:    History, external records was reviewed. Discussed clinical findings ,diagnotic impression: with acute nanda   D/C Lamotrigine   Start Aripiprazole 5mg to 7.5mg , pt Francois Corrales was accepting of plan  Did not change other medications  PDMP reviewed- w/o discrepancies 2024  Lorazepam , has 1 refill,  2024 Zolpidem   Patient is without a therapist  CRISIS plan and treatment plan was not completed due to intensity of acute pathology, pt Francois Corrales was informed of plans of him completing forms on next [facility] appt in 2wks      Medications Prescribed During [facility] Encounter   -     ARIPiprazole (ABILIFY) 5 mg tablet; Aripiprazole 5m tablet po daily x 1 wk. Then 1.5 tablet po daily        Medication List Also:  -     LORazepam (ATIVAN) 0.5 mg tablet; Lorazepam 0.5m tablet po daily PRN  -     zolpidem (AMBIEN CR) 12.5 MG CR tablet; Zolpidem Tartrate ER 12.5m tablet po daily at night  -      (Lexapro) 10 mg tablet; Escitalopram 10m tablet po daily  -     buPROPion (WELLBUTRIN XL) 300 mg 24 hr tablet; Bupropion ER 300m tablet po daily in the morning    Labs Ordered During [facility]  Encounter:  -     CBC and differential;   -     Comprehensive metabolic panel;  -     Lipid Panel with Direct LDL reflex;     [facility]:  -     lamoTRIgine (LaMICtal) 200 MG tablet; Lamotrigine 200m tablet po daily         Treatement: Risks, benefits, and possible side effects of medications explained to patient and patient verbalizes understanding.        Next Appt @ [facility]: 2 wks  _______________________________________________________________________________________________  Patient Encounter : :09-  9:45    Documentin:46- 9:57    Encounter Duration: Time Spent 36 min with Patient.Greater than 50% of total time was spent with the patient     MDM  Number of Diagnoses or Management Options  Diagnosis management comments: 3       Amount and/or Complexity of Data Reviewed  Clinical lab tests: ordered and reviewed  Review and summarize past medical records: yes    Risk of Complications, Morbidity, and/or Mortality  Presenting problems: moderate  Diagnostic procedures: moderate  Management options: moderate         This note may have been written with the assistance of dictation software. Please excuse any grammatical  errors, misspellings,  and abnormal spacing of letters , sentences or paragraphs . For accurate interpretation should read note horizontally

## 2024-02-21 NOTE — PATIENT INSTRUCTIONS
Francois Corrales [id number]   Thanks for presenting to today's Appointment at [facility]  Complete labs prior to next [facility] appointment and fax results to [phone]  Stop Lamotrigine 200mg   Start Aripiprazole 5mg : 1 tablet x 1 wk. Then increase to 1.5 tablets   Your other medications were not changed

## 2024-02-25 DIAGNOSIS — F41.1 GENERALIZED ANXIETY DISORDER: ICD-10-CM

## 2024-02-26 RX ORDER — ESCITALOPRAM OXALATE 10 MG/1
TABLET ORAL
Qty: 90 TABLET | Refills: 0 | OUTPATIENT
Start: 2024-02-26

## 2024-02-26 RX ORDER — ESCITALOPRAM OXALATE 10 MG/1
TABLET ORAL
Qty: 30 TABLET | Refills: 1 | Status: SHIPPED | OUTPATIENT
Start: 2024-02-26

## 2024-02-26 NOTE — TELEPHONE ENCOUNTER
Pt Francois Corrales , 1970 , [facility] chart was reviewed. Lexapro 10mg qty 30 , 1 rf was [facility], pt has [facility] appointment in < 1 month    This note was not shared with the patient due to reasonable likelihood of causing patient harm

## 2024-03-05 NOTE — PATIENT INSTRUCTIONS
Francois Corrales [id number]   Thanks for presenting to today's Appointment at [facility]  Aripiprazole was increased 10mg daily   Your other medications were not changed

## 2024-03-05 NOTE — PSYCH
"  [facility]/Hospital: [facility]-Non Addiction   [address], [id number] [phone]    Psychiatric Progress Note  MRN#: [id number]  Francois Corrales 53 y.o. male    This note was not shared with the patient due to reasonable likelihood of causing patient harm   ___________________________________________________________________________________________________________________________________  OFFICE APPOINTMENT   Seen today at [facility] location                                                Patient Francois Corrales , male ,1970   Prescriber/Physician: Nydia Wilde DO (she/her) Physician Location:   [facility] OUTPATIENT  [address] [facility] [address] [address]       This service was provided in the office.  Patient is currently located in the [place], where I am  licensed.   Patient gave consent to proceed with encounter; acknowledge understanding of security and privacy of encounter   [facility]   Patient verbalized understanding evaluation only involves Psychiatric diagnosing, prescribing, result monitoring   Patient was informed this is a billable service and legal   ___________________________________________________________________________________________________________________________________     Reason For Visit:  Chief Complaint   Patient presents with   • Manic Behavior       Subjective:   David Antoni since prior [facility] appointment, more intense nanda, \" more chatty, sleep improved , duration 6-8hrs recent few nights, but prior was up doing things. .   He recently increased Aripiprazole  to 7.5mg  on Sunday, and stopped Lamotrigine     ROS:  SI/HI : denies   Depression:  w.o    Impulsivity : denies   Irritiable :  w/o  Anxiety: intermittent , on edge and Francois also have extra energy, improved with low dose Lorazepam  Alcohol-  his last drinked  " was 1 wkend ago , 1/2 a beer            Medications: He's compliant to  Aripiprazole 7.5mg , Lexapro 10mg , Lorazepam 0.5 PRN , Zolpidem CR 12.5, Bupropion 300mg  Med s/e:  denies    Medical ROS:   Musculoskeletal:negative for myalgias and muscle spasms  Neurological: negative for recent falls, injurys, abnormal limb movements, tremors .   Pertinent items are noted in HPI, all other symptoms are negative         Mental Status Evaluation:  General Appearance:  Francois Corraels is a 53 y.o.  male age appropriate, casually dressed,  looks stated age   Behavior:  Cooperative, appropriate eye contact    Speech:  normal for rate, rhythm, volume, latency, amount   Mood:  Normal   Affect:  Normal    Thought Process:  normal and logical   Thought Content:  no overt delusions, normal   Perceptual Disturbances: Does not appear preoccupied or responding  Delusions  w/o   Risk Potential: Suicidal Ideations  w/o  Homicidal Ideations w/o  Potential for Aggression Yes  , h/o    Sensorium:  Oriented to person, place ( [facility]), time/date and situation   Memory:  recent and remote memory grossly intact   Consciousness:  alert and awake   Attention: attention span and concentration are appropriate   Insight:  appropriate   Judgment: appropriate   Gait/Station: normal   Motor Activity: no abnormal movements     There were no vitals filed for this visit.     Medications: Verified during current encounter   Current Outpatient Medications on File Prior to Visit   Medication Sig Dispense Refill   • escitalopram (Lexapro) 10 mg tablet Escitalopram 10m tablet po daily 30 tablet 1   • ARIPiprazole (ABILIFY) 5 mg tablet Aripiprazole 5m tablet po daily x 1 wk. Then 1.5 tablet po daily 30 tablet 1   • buPROPion (WELLBUTRIN XL) 300 mg 24 hr tablet Bupropion ER 300m tablet po daily in the morning 90 tablet 0   • chlorhexidine (PERIDEX) 0.12 % solution RINSE WITH 1/2 OUNCE TWICE A DAY AFTER BREAKFAST AND BEDTIME. SPIT  DO NOT SWALLOW     • LORazepam (ATIVAN) 0.5 mg tablet Lorazepam 0.5m tablet po daily PRN 30 tablet 1   • zolpidem (AMBIEN CR) 12.5 MG CR tablet Zolpidem Tartrate ER 12.5m tablet po daily at night 30 tablet 2     No current facility-administered medications on file prior to visit.        Labs: I have personally reviewed all pertinent laboratory/tests results.   Most Recent Labs:   Lab Results   Component Value Date    SODIUM 140 04/15/2023    K 4.3 04/15/2023     04/15/2023    CO2 26 04/15/2023    BUN 15 04/15/2023    CREATININE 1.15 04/15/2023    GLUC 84 04/15/2023    CALCIUM 9.3 04/15/2023    AST 26 04/15/2023    ALT 31 04/15/2023    ALKPHOS 85 04/15/2023    TP 6.3 04/15/2023    ALB 4.7 04/15/2023    TBILI 1.0 04/15/2023    CHOLESTEROL 167 04/15/2023    HDL 38 (L) 04/15/2023    TRIG 98 04/15/2023    LDLCALC 110 (H) 04/15/2023          ________________________________________________________________________    A/P  Francois Corrales,is a 53 y.o.  male, h/o cannabis abuse and alcoholism, now sober > 10yrs, h/o bipolar disorder, anxiety , who presented with labile mood in the setting situational stressor; with findings of  anticipatory anxiety, worries , Positive response to Lexapro.  Recent findings of nanda , now with slight irritability , high energy,  talkative , low sleep with increase activity, the later somewhat improved since low dose Aripiprazole since recent .      DSM5  1. Bipolar 1 disorder, currently manic, mild (HCC)    2. Generalized anxiety disorder    3. Insomnia, unspecified type       PLAN:    History, external records was reviewed. Discussed clinical findings ,diagnotic impression: with acute nanda   Aripiprazole increase 10mg  pt Francois Corrales was accepting of plan  Did not change other medications  PDMP reviewed- w/o discrepancies  24 Zolpidem , 24 Lorazepam   CRISIS plan and treatment plan was not completed due to intensity of acute pathology, pt  Francois Corrales was informed of plans of him completing forms on next [facility] appt in 4wks      Medications Prescribed During [facility] Encounter     -     ARIPiprazole (ABILIFY) 10 mg tablet; Aripiprazole 10m tablet po daily  -     zolpidem (AMBIEN CR) 12.5 MG CR tablet; Zolpidem Tartrate ER 12.5m tablet po daily at night  -     LORazepam (ATIVAN) 0.5 mg tablet; Lorazepam 0.5m tablet po daily PRN      Pending Labs   -     CBC and differential;   -     Comprehensive metabolic panel;  -     Lipid Panel with Direct LDL reflex;     Medications Discontinued at [facility] Encounter:  -     ARIPiprazole (ABILIFY) 5 mg tablet; Aripiprazole 5m tablet po daily x 1 wk. Then 1.5 tablet po daily         Treatement: Risks, benefits, and possible side effects of medications explained to patient and patient verbalizes understanding.        [facility]  _______________________________________________________________________________________________  Patient Encounter :8:32- 8:52   Documenting time: 8:53- 9:02    Encounter Duration: Time Spent 20 min with Patient.Greater than 50% of total time was spent with the patient     MDM  Number of Diagnoses or Management Options  Diagnosis management comments: 3       Amount and/or Complexity of Data Reviewed  Review and summarize past medical records: yes    Risk of Complications, Morbidity, and/or Mortality  Presenting problems: moderate  Diagnostic procedures: moderate  Management options: moderate         This note may have been written with the assistance of dictation software. Please excuse any grammatical  errors, misspellings,  and abnormal spacing of letters , sentences or paragraphs . For accurate interpretation should read note horizontally

## 2024-03-06 ENCOUNTER — OFFICE VISIT (OUTPATIENT)
Dept: PSYCHIATRY | Facility: CLINIC | Age: 54
End: 2024-03-06
Payer: COMMERCIAL

## 2024-03-06 DIAGNOSIS — G47.00 INSOMNIA, UNSPECIFIED TYPE: ICD-10-CM

## 2024-03-06 DIAGNOSIS — F41.1 GENERALIZED ANXIETY DISORDER: ICD-10-CM

## 2024-03-06 DIAGNOSIS — F31.11 BIPOLAR AFFECTIVE DISORDER, CURRENTLY MANIC, MILD (HCC): Primary | ICD-10-CM

## 2024-03-06 PROCEDURE — 99214 OFFICE O/P EST MOD 30 MIN: CPT | Performed by: PSYCHIATRY & NEUROLOGY

## 2024-03-06 RX ORDER — ZOLPIDEM TARTRATE 12.5 MG/1
TABLET, FILM COATED, EXTENDED RELEASE ORAL
Qty: 30 TABLET | Refills: 2 | Status: SHIPPED | OUTPATIENT
Start: 2024-03-06

## 2024-03-06 RX ORDER — ARIPIPRAZOLE 10 MG/1
TABLET ORAL
Qty: 30 TABLET | Refills: 1 | Status: SHIPPED | OUTPATIENT
Start: 2024-03-06

## 2024-03-06 RX ORDER — LORAZEPAM 0.5 MG/1
TABLET ORAL
Qty: 30 TABLET | Refills: 1 | Status: SHIPPED | OUTPATIENT
Start: 2024-03-06

## 2024-03-18 NOTE — PSYCH
[facility]/Hospital: [facility] Outpatient Clinic-Non Addiction   [address] [id number] [phone]    Psychiatric Progress Note  MRN#: [id number]  Francois Corrales 53 y.o. male    This note was not shared with the patient due to reasonable likelihood of causing patient harm   ___________________________________________________________________________________________________________________________________  OFFICE APPOINTMENT   Seen today at [facility] location                                                Patient Francois Corrales , male ,1970   Prescriber/Physician: Nydia Wilde DO (she/her) Physician Location:   [facility] OUTPATIENT  [address] [address]       This service was provided in the office.  Patient is currently located in the [place], where I am  licensed.   Patient gave consent to proceed with encounter; acknowledge understanding of security and privacy of encounter   [facility] identity [facility]   Patient verbalized understanding evaluation only involves Psychiatric diagnosing, prescribing, result monitoring   Patient was informed this is a billable service and legal   ___________________________________________________________________________________________________________________________________     Reason For Visit:  Chief Complaint   Patient presents with   • Manic Behavior       Subjective:   Francois Corrales increased Aripiprazole 10mg, increased energy , restlessness, he cna't stop moveing . Otherwise ,  less nanda thoughts  and sleep is stable   ROS:  SI/HI : denies   Depression:  w.o    Impulsivity : denies   Irritiable :  w/o  Anxiety: no worry   Alcohol-  his last drinked  was 1 wkend ago , 1/2 a beer            Medications: He's compliant to  Aripiprazole 10 mg , Lexapro 10mg , Lorazepam 0.5 PRN , Zolpidem CR 12.5, Bupropion 300mg  Med s/e:   denies    Medical ROS:   Musculoskeletal:negative for myalgias and muscle spasms  Neurological: negative for recent falls, injurys, abnormal limb movements, tremors .   Pertinent items are noted in HPI, all other symptoms are negative         Mental Status Evaluation:  General Appearance:  Francois Corrales is a 53 y.o.  male age appropriate, casually dressed,  looks stated age   Behavior:  Cooperative, appropriate eye contact    Speech:  normal for rate, rhythm, volume, latency, amount and slightly talkative    Mood:  Normal   Affect:  Normal    Thought Process:  normal and logical   Thought Content:  no overt delusions, normal   Perceptual Disturbances: Does not appear preoccupied or responding  Delusions  w/o   Risk Potential: Suicidal Ideations  w/o  Homicidal Ideations w/o  Potential for Aggression Yes  , h/o    Sensorium:  Oriented to person, place ( [facility]), time/date ( 2024) and situation   Memory:  recent and remote memory grossly intact   Consciousness:  alert and awake   Attention: attention span and concentration are appropriate   Insight:  appropriate   Judgment: appropriate   Gait/Station: normal   Motor Activity: no abnormal movements     There were no vitals filed for this visit.     Medications: Verified during current encounter   Current Outpatient Medications on File Prior to Visit   Medication Sig Dispense Refill   • escitalopram (Lexapro) 10 mg tablet Escitalopram 10m tablet po daily 30 tablet 1   • ARIPiprazole (ABILIFY) 10 mg tablet Aripiprazole 10m tablet po daily 30 tablet 1   • buPROPion (WELLBUTRIN XL) 300 mg 24 hr tablet Bupropion ER 300m tablet po daily in the morning 90 tablet 0   • chlorhexidine (PERIDEX) 0.12 % solution RINSE WITH 1/2 OUNCE TWICE A DAY AFTER BREAKFAST AND BEDTIME. SPIT DO NOT SWALLOW     • LORazepam (ATIVAN) 0.5 mg tablet Lorazepam 0.5m tablet po daily PRN 30 tablet 1   • zolpidem (AMBIEN CR) 12.5 MG CR tablet Zolpidem Tartrate  ER 12.5m tablet po daily at night 30 tablet 2     No current facility-administered medications on file prior to visit.        Labs: I have personally reviewed all pertinent laboratory/tests results.   Most Recent Labs:   Lab Results   Component Value Date    SODIUM 140 04/15/2023    K 4.3 04/15/2023     04/15/2023    CO2 26 04/15/2023    BUN 15 04/15/2023    CREATININE 1.15 04/15/2023    GLUC 84 04/15/2023    CALCIUM 9.3 04/15/2023    AST 26 04/15/2023    ALT 31 04/15/2023    ALKPHOS 85 04/15/2023    TP 6.3 04/15/2023    ALB 4.7 04/15/2023    TBILI 1.0 04/15/2023    CHOLESTEROL 167 04/15/2023    HDL 38 (L) 04/15/2023    TRIG 98 04/15/2023    LDLCALC 110 (H) 04/15/2023       ________________________________________________________________________    A/P  Francois Corrales,is a 53 y.o.  male, h/o cannabis abuse and alcoholism, now sober > 10yrs, h/o bipolar disorder, anxiety , who presented with labile mood in the setting situational stressor; with findings of  anticipatory anxiety, worries , Positive response to Lexapro.  Recent findings of nanda , slight irritability , high energy,  talkative , low sleep with increase activity,. Currently improved since Aripiprazole , although complaints of restlessness's.    DSM5  1. Bipolar affective disorder, currently manic, mild (HCC)    2. Generalized anxiety disorder    3. Insomnia, unspecified type        PLAN:    History, external records was reviewed. Discussed clinical findings ,diagnotic impression: with akathisia  Propranolol was started 10mg x 2 , 10mg PRN , pt was accpeting of plan  Did not change other medications    PDMP reviewed- w/o discrepancies  24 Zolpidem , 2024 Lorazepam   CRISIS plan and treatment plan was not completed due to intensity of acute pathology, pt Francois Corrales was informed of plans of him completing forms on next [facility] appt in 4wks      Medications Prescribed During [facility] Encounter     -     propranolol (INDERAL)  10 mg tablet; Propranolol HCL 10m to 2 tablet po daily , 1 tablet PRN    Medication List Also:    -     ARIPiprazole (ABILIFY) 10 mg tablet; Aripiprazole 10m tablet po daily  -     zolpidem (AMBIEN CR) 12.5 MG CR tablet; Zolpidem Tartrate ER 12.5m tablet po daily at night  -     LORazepam (ATIVAN) 0.5 mg tablet; Lorazepam 0.5m tablet po daily PRN      Pending Labs   -     CBC and differential;   -     Comprehensive metabolic panel;  -     Lipid Panel with Direct LDL reflex;          Treatement: Risks, benefits, and possible side effects of medications explained to patient and patient verbalizes understanding.        [facility]  _______________________________________________________________________________________________  Patient Encounter :     3:26PM-    3:45PM                         Documenting 24 2:28PM- 2:33PM    Encounter Duration: Time Spent 19 min with Patient.Greater than 50% of total time was spent with the patient     MDM  Number of Diagnoses or Management Options  Diagnosis management comments: 3       Amount and/or Complexity of Data Reviewed  Review and summarize past medical records: yes    Risk of Complications, Morbidity, and/or Mortality  Presenting problems: moderate  Diagnostic procedures: moderate  Management options: moderate         This note may have been written with the assistance of dictation software. Please excuse any grammatical  errors, misspellings,  and abnormal spacing of letters , sentences or paragraphs . For accurate interpretation should read note horizontally

## 2024-03-18 NOTE — PATIENT INSTRUCTIONS
Francois Corrales [id number]   Thanks for presenting to today's Appointment at [facility]  Propranolol was started   Your  other medications were not changed

## 2024-03-19 ENCOUNTER — OFFICE VISIT (OUTPATIENT)
Dept: PSYCHIATRY | Facility: CLINIC | Age: 54
End: 2024-03-19
Payer: COMMERCIAL

## 2024-03-19 DIAGNOSIS — G47.00 INSOMNIA, UNSPECIFIED TYPE: ICD-10-CM

## 2024-03-19 DIAGNOSIS — F31.11 BIPOLAR AFFECTIVE DISORDER, CURRENTLY MANIC, MILD (HCC): Primary | ICD-10-CM

## 2024-03-19 DIAGNOSIS — F41.1 GENERALIZED ANXIETY DISORDER: ICD-10-CM

## 2024-03-19 PROCEDURE — 99214 OFFICE O/P EST MOD 30 MIN: CPT | Performed by: PSYCHIATRY & NEUROLOGY

## 2024-03-19 RX ORDER — PROPRANOLOL HYDROCHLORIDE 10 MG/1
TABLET ORAL
Qty: 90 TABLET | Refills: 1 | Status: SHIPPED | OUTPATIENT
Start: 2024-03-19

## 2024-03-24 ENCOUNTER — HOSPITAL ENCOUNTER (EMERGENCY)
Facility: HOSPITAL | Age: 54
Discharge: HOME | End: 2024-03-24
Attending: EMERGENCY MEDICINE
Payer: COMMERCIAL

## 2024-03-24 VITALS
TEMPERATURE: 98.2 F | SYSTOLIC BLOOD PRESSURE: 156 MMHG | RESPIRATION RATE: 18 BRPM | HEART RATE: 83 BPM | DIASTOLIC BLOOD PRESSURE: 109 MMHG | OXYGEN SATURATION: 98 %

## 2024-03-24 DIAGNOSIS — F41.9 ANXIETY: Primary | ICD-10-CM

## 2024-03-24 DIAGNOSIS — F41.1 GENERALIZED ANXIETY DISORDER: ICD-10-CM

## 2024-03-24 LAB
ALBUMIN SERPL BCP-MCNC: 4.9 G/DL (ref 3.5–5)
ALP SERPL-CCNC: 77 U/L (ref 34–104)
ALT SERPL W P-5'-P-CCNC: 33 U/L (ref 7–52)
AMPHETAMINES SERPL QL SCN: NEGATIVE
ANION GAP SERPL CALCULATED.3IONS-SCNC: 6 MMOL/L (ref 4–13)
AST SERPL W P-5'-P-CCNC: 24 U/L (ref 13–39)
BACTERIA UR QL AUTO: ABNORMAL /HPF
BARBITURATES UR QL: NEGATIVE
BASOPHILS # BLD AUTO: 0.12 THOUSANDS/ÂΜL (ref 0–0.1)
BASOPHILS NFR BLD AUTO: 1 % (ref 0–1)
BENZODIAZ UR QL: NEGATIVE
BILIRUB SERPL-MCNC: 0.9 MG/DL (ref 0.2–1)
BILIRUB UR QL STRIP: NEGATIVE
BUN SERPL-MCNC: 18 MG/DL (ref 5–25)
CALCIUM SERPL-MCNC: 9.4 MG/DL (ref 8.4–10.2)
CHLORIDE SERPL-SCNC: 102 MMOL/L (ref 96–108)
CLARITY UR: CLEAR
CO2 SERPL-SCNC: 28 MMOL/L (ref 21–32)
COCAINE UR QL: NEGATIVE
COLOR UR: YELLOW
CREAT SERPL-MCNC: 1.07 MG/DL (ref 0.6–1.3)
EOSINOPHIL # BLD AUTO: 0.37 THOUSAND/ÂΜL (ref 0–0.61)
EOSINOPHIL NFR BLD AUTO: 3 % (ref 0–6)
ERYTHROCYTE [DISTWIDTH] IN BLOOD BY AUTOMATED COUNT: 11.9 % (ref 11.6–15.1)
ETHANOL SERPL-MCNC: <10 MG/DL
GFR SERPL CREATININE-BSD FRML MDRD: 79 ML/MIN/1.73SQ M
GLUCOSE SERPL-MCNC: 105 MG/DL (ref 65–140)
GLUCOSE UR STRIP-MCNC: NEGATIVE MG/DL
HCT VFR BLD AUTO: 53.9 % (ref 36.5–49.3)
HGB BLD-MCNC: 18 G/DL (ref 12–17)
HGB UR QL STRIP.AUTO: NEGATIVE
IMM GRANULOCYTES # BLD AUTO: 0.25 THOUSAND/UL (ref 0–0.2)
IMM GRANULOCYTES NFR BLD AUTO: 2 % (ref 0–2)
KETONES UR STRIP-MCNC: NEGATIVE MG/DL
LEUKOCYTE ESTERASE UR QL STRIP: NEGATIVE
LYMPHOCYTES # BLD AUTO: 2.67 THOUSANDS/ÂΜL (ref 0.6–4.47)
LYMPHOCYTES NFR BLD AUTO: 21 % (ref 14–44)
MCH RBC QN AUTO: 28.8 PG (ref 26.8–34.3)
MCHC RBC AUTO-ENTMCNC: 33.4 G/DL (ref 31.4–37.4)
MCV RBC AUTO: 86 FL (ref 82–98)
METHADONE UR QL: NEGATIVE
MONOCYTES # BLD AUTO: 1.05 THOUSAND/ÂΜL (ref 0.17–1.22)
MONOCYTES NFR BLD AUTO: 8 % (ref 4–12)
MUCOUS THREADS UR QL AUTO: ABNORMAL
NEUTROPHILS # BLD AUTO: 8.31 THOUSANDS/ÂΜL (ref 1.85–7.62)
NEUTS SEG NFR BLD AUTO: 65 % (ref 43–75)
NITRITE UR QL STRIP: NEGATIVE
NON-SQ EPI CELLS URNS QL MICRO: ABNORMAL /HPF
NRBC BLD AUTO-RTO: 0 /100 WBCS
OPIATES UR QL SCN: NEGATIVE
OXYCODONE+OXYMORPHONE UR QL SCN: NEGATIVE
PCP UR QL: NEGATIVE
PH UR STRIP.AUTO: 6 [PH]
PLATELET # BLD AUTO: 250 THOUSANDS/UL (ref 149–390)
PMV BLD AUTO: 9.2 FL (ref 8.9–12.7)
POTASSIUM SERPL-SCNC: 3.9 MMOL/L (ref 3.5–5.3)
PROT SERPL-MCNC: 7.3 G/DL (ref 6.4–8.4)
PROT UR STRIP-MCNC: ABNORMAL MG/DL
RBC # BLD AUTO: 6.25 MILLION/UL (ref 3.88–5.62)
RBC #/AREA URNS AUTO: ABNORMAL /HPF
SODIUM SERPL-SCNC: 136 MMOL/L (ref 135–147)
SP GR UR STRIP.AUTO: >=1.03 (ref 1–1.03)
THC UR QL: NEGATIVE
TSH SERPL DL<=0.05 MIU/L-ACNC: 3.66 UIU/ML (ref 0.45–4.5)
UROBILINOGEN UR STRIP-ACNC: <2 MG/DL
WBC # BLD AUTO: 12.77 THOUSAND/UL (ref 4.31–10.16)
WBC #/AREA URNS AUTO: ABNORMAL /HPF

## 2024-03-24 PROCEDURE — 99283 EMERGENCY DEPT VISIT LOW MDM: CPT

## 2024-03-24 PROCEDURE — 99244 OFF/OP CNSLTJ NEW/EST MOD 40: CPT | Performed by: GENERAL PRACTICE

## 2024-03-24 PROCEDURE — 36415 COLL VENOUS BLD VENIPUNCTURE: CPT | Performed by: PHYSICIAN ASSISTANT

## 2024-03-24 PROCEDURE — 82077 ASSAY SPEC XCP UR&BREATH IA: CPT | Performed by: PHYSICIAN ASSISTANT

## 2024-03-24 PROCEDURE — 84443 ASSAY THYROID STIM HORMONE: CPT | Performed by: PHYSICIAN ASSISTANT

## 2024-03-24 PROCEDURE — 81001 URINALYSIS AUTO W/SCOPE: CPT | Performed by: PHYSICIAN ASSISTANT

## 2024-03-24 PROCEDURE — 99284 EMERGENCY DEPT VISIT MOD MDM: CPT | Performed by: PHYSICIAN ASSISTANT

## 2024-03-24 PROCEDURE — 80307 DRUG TEST PRSMV CHEM ANLYZR: CPT | Performed by: PHYSICIAN ASSISTANT

## 2024-03-24 PROCEDURE — 85025 COMPLETE CBC W/AUTO DIFF WBC: CPT | Performed by: PHYSICIAN ASSISTANT

## 2024-03-24 PROCEDURE — 93005 ELECTROCARDIOGRAM TRACING: CPT

## 2024-03-24 PROCEDURE — 80053 COMPREHEN METABOLIC PANEL: CPT | Performed by: PHYSICIAN ASSISTANT

## 2024-03-24 RX ORDER — PROPRANOLOL HYDROCHLORIDE 20 MG/1
10 TABLET ORAL ONCE
Status: COMPLETED | OUTPATIENT
Start: 2024-03-24 | End: 2024-03-24

## 2024-03-24 RX ORDER — LORAZEPAM 0.5 MG/1
0.5 TABLET ORAL ONCE
Status: COMPLETED | OUTPATIENT
Start: 2024-03-24 | End: 2024-03-24

## 2024-03-24 RX ORDER — LAMOTRIGINE 25 MG/1
50 TABLET ORAL DAILY
Qty: 60 TABLET | Refills: 0 | Status: SHIPPED | OUTPATIENT
Start: 2024-03-24

## 2024-03-24 RX ORDER — ARIPIPRAZOLE 5 MG/1
5 TABLET ORAL DAILY
Qty: 30 TABLET | Refills: 0 | Status: SHIPPED | OUTPATIENT
Start: 2024-03-24

## 2024-03-24 RX ORDER — LORAZEPAM 1 MG/1
1 TABLET ORAL DAILY PRN
Qty: 7 TABLET | Refills: 0 | Status: SHIPPED | OUTPATIENT
Start: 2024-03-24 | End: 2024-03-29 | Stop reason: SDUPTHER

## 2024-03-24 RX ADMIN — LORAZEPAM 0.5 MG: 0.5 TABLET ORAL at 18:59

## 2024-03-24 RX ADMIN — PROPRANOLOL HYDROCHLORIDE 10 MG: 20 TABLET ORAL at 18:55

## 2024-03-24 NOTE — Clinical Note
Francois Corrales should be transferred out to behavioral [facility] and has been medically cleared.

## 2024-03-24 NOTE — ED NOTES
54 y/o male presented to the ED. Pt reports that he feels like he is coming off a manic episode. Pt states he has SI for 3 days. Pt has no plan. Pt denies hallucinations. CIS spoke with the patient on the phone to do virtual assessment. CIS asked the patient if he was okay with this. The patient was okay with doing the assessment over the phone. The patient explained that he has Bipolar and has been struggling recently with severe anxiety. The patient stated that he has had such sever anxiety it causes his body to shake. The patient stated his anxiety has been so bad that he had to take time off of work because he could not concentrate. The patient stated he takes medication for the anxiety but he does not believe the medications are working. The patient stated today he has had some passing SI with no plan. The patient states the anxiety is non stop all day. The patient denies HI/AVH/Paranoia. The patient has fleeting SI with no plan for the past couple days but currently does not have any SI. The patient reports having good sleep due to the medication he takes at bedtime. The patient reports having a normal appetite. The patient has an outpatient psychiatrists thru the [facility]. The patient was seeing the psychiatrist every 5 months but since his anxiety has been increasing he has had the appointments moved to monthly appointments.  The patient is wanting to see if he can get his medications adjusted and a list of some outpatient resources       Psych Consult will be ordered for the patient to discuss his medication adjustment with a psychiatrist. Outpatient resources will be provided to the patient       Yoselin MARTINEZ

## 2024-03-24 NOTE — ED PROVIDER NOTES
"History  Chief Complaint   Patient presents with    Psychiatric Evaluation     Pt reports that he feels like he is coming off a manic episode. Pt states he has SI for 3 days. Pt has no plan. Pt denies hallucinations     Patient is a 52 yo wm with history of bipolar disorder who reports severe anxiety for greater than 1 week. States his anxiety has increased beginning approximately 1 month ago, but not to this degree. His psychiatrist out of [facility] changes his medication from lamictal to ability 2-4 weeks ago. He mentioned the increased anxiety to her last week, and she prescribed propranolol. Patient denies auditory or visual hallucinations. Smoked marijuana \"one puff\" last week to try to help his anxiety. Denies illicit drug use otherwise. Denies alcohol. Denies smoking cigarettes or vaping. When asked if he is suicidal, patient reports he has had thoughts that he would not be able to continue living if he can not get his anxiety under control. He, at one point, had thoughts of running car into a tree but then stated he could never do that. Lives with wife/15 yo daughter. Unable to go to work last night due to his anxiety         Prior to Admission Medications   Prescriptions Last Dose Informant Patient Reported? Taking?   LORazepam (ATIVAN) 0.5 mg tablet 3/24/2024  No Yes   Sig: Lorazepam 0.5m tablet po daily PRN   buPROPion (WELLBUTRIN XL) 300 mg 24 hr tablet 3/24/2024  No Yes   Sig: Bupropion ER 300m tablet po daily in the morning   chlorhexidine (PERIDEX) 0.12 % solution   Yes No   Sig: RINSE WITH 1/2 OUNCE TWICE A DAY AFTER BREAKFAST AND BEDTIME. SPIT DO NOT SWALLOW   escitalopram (Lexapro) 10 mg tablet 3/24/2024  No Yes   Sig: Escitalopram 10m tablet po daily   propranolol (INDERAL) 10 mg tablet 3/24/2024  No Yes   Sig: Propranolol HCL 10m to 2 tablet po daily , 1 tablet PRN   zolpidem (AMBIEN CR) 12.5 MG CR tablet 3/24/2024  No Yes   Sig: Zolpidem Tartrate ER 12.5m tablet po " daily at night      Facility-Administered Medications: None       Past Medical History:   Diagnosis Date    Bipolar 1 disorder (HCC)     Moderate cannabis use disorder (HCC) 12/05/2022    Sleep apnea     pt uses CPAP       Past Surgical History:   Procedure Laterality Date    CHOLECYSTECTOMY      COLONOSCOPY      HERNIA REPAIR         Family History   Problem Relation Age of Onset    Diabetes Mother      I have reviewed and agree with the history as documented.    E-Cigarette/Vaping    E-Cigarette Use Never User      E-Cigarette/Vaping Substances     Social History     Tobacco Use    Smoking status: Never    Smokeless tobacco: Never   Vaping Use    Vaping status: Never Used   Substance Use Topics    Alcohol use: Yes    Drug use: No       Review of Systems   Constitutional:  Negative for fever.   Respiratory:  Negative for shortness of breath.    Cardiovascular:  Negative for chest pain.   Gastrointestinal:  Negative for abdominal pain.   Psychiatric/Behavioral:  Positive for suicidal ideas. Negative for agitation and hallucinations. The patient is nervous/anxious.        Physical Exam  Physical Exam  Vitals and nursing note reviewed.   Constitutional:       General: He is not in acute distress.     Appearance: Normal appearance. He is not ill-appearing, toxic-appearing or diaphoretic.   HENT:      Head: Normocephalic and atraumatic.      Right Ear: Tympanic membrane, ear canal and external ear normal.      Left Ear: Tympanic membrane, ear canal and external ear normal.      Nose: Nose normal.      Mouth/Throat:      Mouth: Mucous membranes are moist.   Eyes:      Extraocular Movements: Extraocular movements intact.      Conjunctiva/sclera: Conjunctivae normal.      Pupils: Pupils are equal, round, and reactive to light.   Cardiovascular:      Rate and Rhythm: Normal rate and regular rhythm.      Pulses: Normal pulses.      Heart sounds: Normal heart sounds.   Pulmonary:      Effort: Pulmonary effort is normal.       Breath sounds: Normal breath sounds.   Abdominal:      General: Abdomen is flat. Bowel sounds are normal.      Palpations: Abdomen is soft.   Musculoskeletal:         General: Normal range of motion.      Cervical back: Normal range of motion and neck supple.   Skin:     General: Skin is warm and dry.      Capillary Refill: Capillary refill takes less than 2 seconds.   Neurological:      General: No focal deficit present.      Mental Status: He is alert and oriented to person, place, and time. Mental status is at baseline.   Psychiatric:      Comments: Anxious  Cooperative          Vital Signs  ED Triage Vitals   Temperature Pulse Respirations Blood Pressure SpO2   03/24/24 1517 03/24/24 1517 03/24/24 1517 03/24/24 1517 03/24/24 1517   98.2 °F (36.8 °C) 90 18 167/97 98 %      Temp src Heart Rate Source Patient Position - Orthostatic VS BP Location FiO2 (%)   -- -- -- -- --             Pain Score       03/24/24 1829       No Pain           Vitals:    03/24/24 1517 03/24/24 1855   BP: 167/97 (!) 156/109   Pulse: 90 83         Visual Acuity      ED Medications  Medications   LORazepam (ATIVAN) tablet 0.5 mg (0.5 mg Oral Given 3/24/24 1859)   propranolol (INDERAL) tablet 10 mg (10 mg Oral Given 3/24/24 1855)       Diagnostic Studies  Results Reviewed       Procedure Component Value Units Date/Time    Urine Microscopic [[id number]]  (Abnormal) Collected: 03/24/24 1631    Lab Status: Final result Specimen: Urine, Clean Catch Updated: 03/24/24 1709     RBC, UA 0-1 /hpf      WBC, UA 2-4 /hpf      Epithelial Cells Occasional /hpf      Bacteria, UA Occasional /hpf      MUCUS THREADS Innumerable    TSH [[id number]]  (Normal) Collected: 03/24/24 1630    Lab Status: Final result Specimen: Blood from Arm, Left Updated: 03/24/24 1708     TSH 3RD GENERATON 3.659 uIU/mL     UA w Reflex to Microscopic w Reflex to Culture [[id number]]  (Abnormal) Collected: 03/24/24 1631    Lab Status: Final result Specimen: Urine, Clean Catch Updated:  03/24/24 1703     Color, UA Yellow     Clarity, UA Clear     Specific Gravity, UA >=1.030     pH, UA 6.0     Leukocytes, UA Negative     Nitrite, UA Negative     Protein, UA Trace mg/dl      Glucose, UA Negative mg/dl      Ketones, UA Negative mg/dl      Urobilinogen, UA <2.0 mg/dl      Bilirubin, UA Negative     Occult Blood, UA Negative    Rapid drug screen, urine [[id number]]  (Normal) Collected: 03/24/24 1630    Lab Status: Final result Specimen: Urine, Clean Catch Updated: 03/24/24 1654     Amph/Meth UR Negative     Barbiturate Ur Negative     Benzodiazepine Urine Negative     Cocaine Urine Negative     Methadone Urine Negative     Opiate Urine Negative     PCP Ur Negative     THC Urine Negative     Oxycodone Urine Negative    Narrative:      FOR MEDICAL PURPOSES ONLY.   IF CONFIRMATION NEEDED PLEASE CONTACT THE LAB WITHIN 5 DAYS.    Drug Screen Cutoff Levels:  AMPHETAMINE/METHAMPHETAMINES  1000 ng/mL  BARBITURATES     200 ng/mL  BENZODIAZEPINES     200 ng/mL  COCAINE      300 ng/mL  METHADONE      300 ng/mL  OPIATES      300 ng/mL  PHENCYCLIDINE     25 ng/mL  THC       50 ng/mL  OXYCODONE      100 ng/mL    Comprehensive metabolic panel [[id number]] Collected: 03/24/24 1630    Lab Status: Final result Specimen: Blood from Arm, Left Updated: 03/24/24 1651     Sodium 136 mmol/L      Potassium 3.9 mmol/L      Chloride 102 mmol/L      CO2 28 mmol/L      ANION GAP 6 mmol/L      BUN 18 mg/dL      Creatinine 1.07 mg/dL      Glucose 105 mg/dL      Calcium 9.4 mg/dL      AST 24 U/L      ALT 33 U/L      Alkaline Phosphatase 77 U/L      Total Protein 7.3 g/dL      Albumin 4.9 g/dL      Total Bilirubin 0.90 mg/dL      eGFR 79 ml/min/1.73sq m     Narrative:      National Kidney Disease Foundation guidelines for Chronic Kidney Disease (CKD):     Stage 1 with normal or high GFR (GFR > 90 mL/min/1.73 square meters)    Stage 2 Mild CKD (GFR = 60-89 mL/min/1.73 square meters)    Stage 3A Moderate CKD (GFR = 45-59 mL/min/1.73  square meters)    Stage 3B Moderate CKD (GFR = 30-44 mL/min/1.73 square meters)    Stage 4 Severe CKD (GFR = 15-29 mL/min/1.73 square meters)    Stage 5 End Stage CKD (GFR <15 mL/min/1.73 square meters)  Note: GFR calculation is accurate only with a steady state creatinine    Ethanol [[id number]]  (Normal) Collected: 03/24/24 1630    Lab Status: Final result Specimen: Blood from Arm, Left Updated: 03/24/24 1651     Ethanol Lvl <10 mg/dL     CBC and differential [[id number]]  (Abnormal) Collected: 03/24/24 1630    Lab Status: Final result Specimen: Blood from Arm, Left Updated: 03/24/24 1637     WBC 12.77 Thousand/uL      RBC 6.25 Million/uL      Hemoglobin 18.0 g/dL      Hematocrit 53.9 %      MCV 86 fL      MCH 28.8 pg      MCHC 33.4 g/dL      RDW 11.9 %      MPV 9.2 fL      Platelets 250 Thousands/uL      nRBC 0 /100 WBCs      Neutrophils Relative 65 %      Immature Grans % 2 %      Lymphocytes Relative 21 %      Monocytes Relative 8 %      Eosinophils Relative 3 %      Basophils Relative 1 %      Neutrophils Absolute 8.31 Thousands/µL      Absolute Immature Grans 0.25 Thousand/uL      Absolute Lymphocytes 2.67 Thousands/µL      Absolute Monocytes 1.05 Thousand/µL      Eosinophils Absolute 0.37 Thousand/µL      Basophils Absolute 0.12 Thousands/µL                    No orders to display              Procedures  ECG 12 Lead Documentation Only    Date/Time: 3/24/2024 8:19 PM    Performed by: Chris Schultz PA-C  Authorized by: Chris Schultz PA-C    ECG reviewed by me, the ED Provider: yes    Patient location:  ED  Interpretation:     Interpretation: normal    Rate:     ECG rate:  75    ECG rate assessment: normal    Rhythm:     Rhythm: sinus rhythm    Ectopy:     Ectopy: none    QRS:     QRS axis:  Normal    QRS intervals:  Normal  Conduction:     Conduction: normal    ST segments:     ST segments:  Normal  T waves:     T waves: normal             ED Course                                              Medical Decision Making  Patient medically cleared for behavioral health evaluation and final disposition.  Pt evaluated by ED crisis worker, then [facility] psychiatrist Dr Leodan Weston. He advised medication changes including following:Recommend starting lamotrigine 25 mg daily for 7 days and then increase to 50 mg daily.  Additionally recommend reducing Abilify to 5 mg daily and providing 1 week of Ativan 1 mg daily. Advised patient may be discharged home. Resources provided to patient. PT advised to follow up with his psychiatrist out of [facility]. Return precautions reviewed.                       Amount and/or Complexity of Data Reviewed  Labs: ordered.    Risk  Prescription drug management.  Decision regarding hospitalization.             Disposition  Final diagnoses:   Anxiety     Time reflects when diagnosis was documented in both MDM as applicable and the Disposition within this note       Time User Action Codes Description Comment    3/24/2024  5:30 PM Chris Schultz Add [F41.9] Anxiety     3/24/2024  8:14 PM Leslie Weston Add [F41.1] Generalized anxiety disorder           ED Disposition       ED Disposition   Discharge    Condition   Stable    Date/Time   Sun Mar 24, 2024  9:01 PM    Comment   Francois Corrales should be transferred out to behavioral health and has been medically cleared.               Follow-up Information    None         Discharge Medication List as of 3/24/2024  9:02 PM        START taking these medications    Details   ARIPiprazole (ABILIFY) 5 mg tablet Take 1 tablet (5 mg total) by mouth daily, Starting Sun 3/24/2024, Normal      lamoTRIgine (LaMICtal) 25 mg tablet Take 2 tablets (50 mg total) by mouth daily Take one daily for 7 days then 2 daily thereafter, Starting Sun 3/24/2024, Normal      !! LORazepam (ATIVAN) 1 mg tablet Take 1 tablet (1 mg total) by mouth daily as needed for anxiety for up to 7 days, Starting Sun 3/24/2024, Until  Sun 3/31/2024 at 2359, Normal       !! - Potential duplicate medications found. Please discuss with provider.        CONTINUE these medications which have NOT CHANGED    Details   buPROPion (WELLBUTRIN XL) 300 mg 24 hr tablet Bupropion ER 300m tablet po daily in the morning, Normal      escitalopram (Lexapro) 10 mg tablet Escitalopram 10m tablet po daily, Normal      !! LORazepam (ATIVAN) 0.5 mg tablet Lorazepam 0.5m tablet po daily PRN, Normal      propranolol (INDERAL) 10 mg tablet Propranolol HCL 10m to 2 tablet po daily , 1 tablet PRN, Normal      zolpidem (AMBIEN CR) 12.5 MG CR tablet Zolpidem Tartrate ER 12.5m tablet po daily at night, Normal      chlorhexidine (PERIDEX) 0.12 % solution RINSE WITH 1/2 OUNCE TWICE A DAY AFTER BREAKFAST AND BEDTIME. SPIT DO NOT SWALLOW, Historical Med       !! - Potential duplicate medications found. Please discuss with provider.          No discharge procedures on file.    PDMP Review         Value Time User    PDMP Reviewed  Yes 3/19/2024  3:27 PM Nydia Wilde DO            ED Provider  Electronically Signed by             Chris Schultz PA-C  [phone]

## 2024-03-24 NOTE — CONSULTS
TeleConsultation - Behavioral Health   Francois Corrales 53 y.o. male MRN: [id number]  Unit/Bed#: ED 05 Encounter: [id number]        REQUIRED DOCUMENTATION:     1. This service was provided via Telemedicine.  2. Provider located at [place].  3. TeleMed provider: Leslie Weston MD.  4. Identify all parties in room with patient during tele consult: Patient   5.Patient was then informed that this was a Telemedicine visit and that the exam was being conducted confidentially over secure lines. My office door was closed. No one else was in the room.  Patient acknowledged consent and understanding of privacy and security of the Telemedicine visit, and gave us permission to have the assistant stay in the room in order to assist with the history and to conduct the exam.  I informed the patient that I have reviewed their record in Epic and presented the opportunity for them to ask any questions regarding the visit today.  The patient agreed to participate.      Discussed with  Steve Gates DO      Assessment/Plan         Present on Admission:  **None**    Assessment:    Unspecified Mood Disorder    Patient appears to be presenting with akathisia related to current dosing of Abilify that has not improved with propranolol and per patient preference would like to restart lamotrigine.  Recommend starting lamotrigine 25 mg daily for 7 days and then increase to 50 mg daily.  Additionally recommend reducing Abilify to 5 mg daily and providing 1 week of Ativan 1 mg daily. Patient without any indication for voluntary or involuntary IP psychiatric admission.      Treatment Plan:    Planned Medication Changes:    -Per Above     Current Medications:         Risks / Benefits of Treatment:    Risks, benefits, and possible side effects of medications explained to patient and patient verbalizes understanding.      Other treatment modalities recommended as indicated:    psychotherapy  outpatient referral      Inpatient consult to  Psychiatry  Consult performed by: Leslie Weston MD  Consult ordered by: Chris Schultz PA-C        Physician Requesting Consult: Steve Gates DO  Principal Problem:<principal problem not specified>    Reason for Consult:  Psych Evaluation       History of Present Illness        Per Crisis Evaluation by Yoselin Morgan:  52 y/o male presented to the ED. Pt reports that he feels like he is coming off a manic episode. Pt states he has SI for 3 days. Pt has no plan. Pt denies hallucinations. CIS spoke with the patient on the phone to do virtual assessment. CIS asked the patient if he was okay with this. The patient was okay with doing the assessment over the phone. The patient explained that he has Bipolar and has been struggling recently with severe anxiety. The patient stated that he has had such sever anxiety it causes his body to shake. The patient stated his anxiety has been so bad that he had to take time off of work because he could not concentrate. The patient stated he takes medication for the anxiety but he does not believe the medications are working. The patient stated today he has had some passing SI with no plan. The patient states the anxiety is non stop all day. The patient denies HI/AVH/Paranoia. The patient has fleeting SI with no plan for the past couple days but currently does not have any SI. The patient reports having good sleep due to the medication he takes at bedtime. The patient reports having a normal appetite. The patient has an outpatient psychiatrists thru the [facility]. The patient was seeing the psychiatrist every 5 months but since his anxiety has been increasing he has had the appointments moved to monthly appointments.  The patient is wanting to see if he can get his medications adjusted and a list of some outpatient resources. Psych Consult will be ordered for the patient to discuss his medication adjustment with a psychiatrist. Outpatient resources will be provided to  the patient.    Patient states that he has bipolar disorder and that he has really been struggling over the last several weeks and that he had a medication change about 4 weeks ago. Patient states that he has not been able to quell the anxiety. Patient states that he has to move around a lot and that he has not had any improvement from the propranolol and that the Abilify has reduced some self endorsed manic symptoms and helped. Patient states that the Lorazepam has not been helping the way it did in the past. Patient states that he is constantly moving around and shaking. Patient denied any current SI/HI/AVH or other acute psychiatric complaints.            Psychiatric Review Of Systems:    sleep: yes  appetite changes: no  weight changes: no  energy/anergy: yes  interest/pleasure/anhedonia: no  somatic symptoms: no  anxiety/panic: yes  nanda: no  guilty/hopeless: no  self injurious behavior/risky behavior: no    Historical Information     Past Psychiatric History:     Psychiatric Hospitalizations:   No history of past inpatient psychiatric admissions  Outpatient Treatment History:   Yes  Suicide Attempts:   Yes, one attempt by overdose Teenager  History of self-harm:   None  Violence History:   no  Past Psychiatric medication trials: Yes    Substance Abuse History: Denied         Family Psychiatric History:  Mother: Schizophrenia         Social History:    Education: high school diploma/GED  Learning Disabilities:  Denied   Marital history:   Children: Yes   Living arrangement, social support: The patient lives in home with family.  Occupational History: on Denied disability   Functioning Relationships: good support system.  Other Pertinent History: None    Traumatic History:     Abuse: None  Other Traumatic Events: none    Past Medical History:   Diagnosis Date    Bipolar 1 disorder (HCC)     Moderate cannabis use disorder (HCC) 12/05/2022    Sleep apnea     pt uses CPAP       Medical Review Of  Systems:    Review of Systems    Meds/Allergies     all current active meds have been reviewed  No Known Allergies    Objective     Vital signs in last 24 hours:  Temp:  [98.2 °F (36.8 °C)] 98.2 °F (36.8 °C)  HR:  [83-90] 83  Resp:  [18] 18  BP: (156-167)/() 156/109    No intake or output data in the 24 hours ending 03/24/24 1928    Mental Status Evaluation:    Appearance:  age appropriate   Behavior:  normal   Speech:  normal pitch and normal volume   Mood:  anxious   Affect:  normal   Language: naming objects   Thought Process:  normal   Associations intact associations   Thought Content:  normal   Perceptual Disturbances: None   Risk Potential: Suicidal Ideations none  Homicidal Ideations none  Potential for Aggression No   Sensorium:  person, place, and time/date   Cognition:  recent and remote memory grossly intact   Consciousness:  alert    Attention: attention span and concentration were age appropriate   Intellect: within normal limits   Fund of Knowledge: awareness of current events: President   Insight:  limited   Judgment: limited   Muscle Strength:  Muscle Tone: normal  NFT  normal   Gait/Station: normal gait/station   Motor Activity: no abnormal movements       Lab Results: I have personally reviewed all pertinent laboratory/tests results.     Most Recent Labs:   Lab Results   Component Value Date    WBC 12.77 (H) 03/24/2024    RBC 6.25 (H) 03/24/2024    HGB 18.0 (H) 03/24/2024    HCT 53.9 (H) 03/24/2024     03/24/2024    RDW 11.9 03/24/2024    NEUTROABS 8.31 (H) 03/24/2024    SODIUM 136 03/24/2024    K 3.9 03/24/2024     03/24/2024    CO2 28 03/24/2024    BUN 18 03/24/2024    CREATININE 1.07 03/24/2024    GLUC 105 03/24/2024    CALCIUM 9.4 03/24/2024    AST 24 03/24/2024    ALT 33 03/24/2024    ALKPHOS 77 03/24/2024    TP 7.3 03/24/2024    ALB 4.9 03/24/2024    TBILI 0.90 03/24/2024    CHOLESTEROL 167 04/15/2023    HDL 38 (L) 04/15/2023    TRIG 98 04/15/2023    LDLCALC 110 (H)  "04/15/2023    [id number] 3.659 03/24/2024       Imaging Studies: No results found.  EKG/Pathology/Other Studies: No results found for: \"VENTRATE\", \"ATRIALRATE\", \"PRINT\", \"QRSDINT\", \"QTINT\", \"QTCINT\", \"PAXIS\", \"QRSAXIS\", \"TWAVEAXIS\"     Code Status: No Order  Advance Directive and Living Will:      Power of :    POLST:      Screenings:    1. Nutrition Screening  Nutrition Assessment (completed by Staff): Nutrition  Appetite: Good    2. Pain Screening  Pain Screening: Pain Assessment  Pain Score: 0    3. Suicide Screening  ED Crisis Suicide Risk Assessment: Suicide Risk Assessment  Violence Risk to Self: Denies ideation within past 6 months  Protective Factors: The patient does not want to die, The patient has desire to live      Counseling / Coordination of Care:    Total floor / unit time spent today 30 minutes. Greater than 50% of total time was spent with the patient and / or family counseling and / or coordination of care. A description of the counseling / coordination of care: Direct Patient Care, Chart Review, and Documentation.          "

## 2024-03-25 NOTE — DISCHARGE INSTRUCTIONS
The following recommendations were made on the part of psychiastrist Dr Weston:  Recommend starting lamotrigine 25 mg daily for 7 days and then increase to 50 mg daily. Additionally recommend reducing Abilify to 5 mg daily and providing 1 week of Ativan 1 mg daily     Resources were given to you in the ED    Follow up with your psychiatrist at [facility] this week. Call for appointment    Return to ED for new or worsening symptoms

## 2024-03-25 NOTE — ED NOTES
RN gave pt personal belonging back. RN also gave pt a packet of recourses to take home and go through. Pt is going to call his wife to pick him up. Pt doesn't live at [facility] and does out pt services. Pt is getting dressed at this time and waiting for D/C paper work      Debbi Viramontes RN  03/24/24 2059

## 2024-03-26 ENCOUNTER — OFFICE VISIT (OUTPATIENT)
Dept: FAMILY MEDICINE CLINIC | Facility: CLINIC | Age: 54
End: 2024-03-26
Payer: COMMERCIAL

## 2024-03-26 VITALS
BODY MASS INDEX: 35.44 KG/M2 | WEIGHT: 247 LBS | HEART RATE: 88 BPM | TEMPERATURE: 97 F | SYSTOLIC BLOOD PRESSURE: 129 MMHG | OXYGEN SATURATION: 97 % | DIASTOLIC BLOOD PRESSURE: 88 MMHG

## 2024-03-26 DIAGNOSIS — F41.1 GENERALIZED ANXIETY DISORDER: ICD-10-CM

## 2024-03-26 DIAGNOSIS — F31.70 BIPOLAR DISORDER IN FULL REMISSION, MOST RECENT EPISODE UNSPECIFIED TYPE (HCC): Primary | ICD-10-CM

## 2024-03-26 PROCEDURE — 99213 OFFICE O/P EST LOW 20 MIN: CPT | Performed by: NURSE PRACTITIONER

## 2024-03-26 NOTE — PROGRESS NOTES
Name: Francois Corrales      : 1970      MRN: [id number]  Encounter Provider: ALEK Horan  Encounter Date: 3/26/2024   Encounter department: [facility]    Assessment & Plan     1. Bipolar disorder in full remission, most recent episode unspecified type (HCC)-taking Lamictal, Wellbutrin, and Lexapro and he is currently weaning off Abilify. Has appointment with a new psychiatrist tomorrow for evaluation.    2. Generalized anxiety disorder- Taking Ativan prn, and propanolol. Will follow with psych tomorrow           Subjective      F/up ED. Patient is a 54 yo male with history of bipolar disorder who was seen in ED yesterday for reports of severe anxiety for greater than 1 week. He states his anxiety has increased since his psychiatrist changed his medications. psychiatrist is from [facility] and changed his medication from Lamictal to abilify. The ED doctor restarted his Lamictal and is weaning off the Abilify. He states he no longer wants to continue with the psychiatrist at [place] but he does have an appointment tomorrow with a new psychiatrist for evaluation. He states he is unable to work due to the increase of anxiety and is looking to get short term disability.      Review of Systems   Constitutional:  Negative for activity change.   Respiratory:  Negative for chest tightness and shortness of breath.    Cardiovascular:  Negative for chest pain.   Neurological:  Negative for dizziness and headaches.   Psychiatric/Behavioral:  The patient is nervous/anxious.        Current Outpatient Medications on File Prior to Visit   Medication Sig    ARIPiprazole (ABILIFY) 5 mg tablet Take 1 tablet (5 mg total) by mouth daily    buPROPion (WELLBUTRIN XL) 300 mg 24 hr tablet Bupropion ER 300m tablet po daily in the morning    escitalopram (Lexapro) 10 mg tablet Escitalopram 10m tablet po daily    lamoTRIgine (LaMICtal) 25 mg tablet Take 2 tablets (50 mg total) by  mouth daily Take one daily for 7 days then 2 daily thereafter    LORazepam (ATIVAN) 1 mg tablet Take 1 tablet (1 mg total) by mouth daily as needed for anxiety for up to 7 days    propranolol (INDERAL) 10 mg tablet Propranolol HCL 10m to 2 tablet po daily , 1 tablet PRN    zolpidem (AMBIEN CR) 12.5 MG CR tablet Zolpidem Tartrate ER 12.5m tablet po daily at night    [DISCONTINUED] chlorhexidine (PERIDEX) 0.12 % solution RINSE WITH 1/2 OUNCE TWICE A DAY AFTER BREAKFAST AND BEDTIME. SPIT DO NOT SWALLOW    [DISCONTINUED] LORazepam (ATIVAN) 0.5 mg tablet Lorazepam 0.5m tablet po daily PRN (Patient not taking: Reported on 3/26/2024)       Objective     /88 (BP Location: Left arm, Patient Position: Sitting, Cuff Size: Large)   Pulse 88   Temp (!) 97 °F (36.1 °C) (Tympanic)   Wt 112 kg (247 lb)   SpO2 97%   BMI 35.44 kg/m²     Physical Exam  Vitals and nursing note reviewed.   Constitutional:       General: He is not in acute distress.     Appearance: Normal appearance. He is not ill-appearing, toxic-appearing or diaphoretic.   HENT:      Head: Normocephalic.   Eyes:      Extraocular Movements: Extraocular movements intact.   Pulmonary:      Effort: Pulmonary effort is normal. No respiratory distress.   Musculoskeletal:         General: Normal range of motion.   Skin:     Coloration: Skin is not pale.      Findings: No erythema.   Neurological:      Mental Status: He is alert and oriented to person, place, and time.   Psychiatric:         Mood and Affect: Mood normal.         Behavior: Behavior normal.       PHQ-2/9 Depression Screening    Little interest or pleasure in doing things: 0 - not at all  Feeling down, depressed, or hopeless: 0 - not at all  PHQ-2 Score: 0  PHQ-2 Interpretation: Negative depression screen         ALEK Horan

## 2024-03-27 LAB
ATRIAL RATE: 75 BPM
P AXIS: 17 DEGREES
PR INTERVAL: 148 MS
QRS AXIS: 0 DEGREES
QRSD INTERVAL: 98 MS
QT INTERVAL: 382 MS
QTC INTERVAL: 426 MS
T WAVE AXIS: -2 DEGREES
VENTRICULAR RATE: 75 BPM

## 2024-03-27 PROCEDURE — 93010 ELECTROCARDIOGRAM REPORT: CPT | Performed by: INTERNAL MEDICINE

## 2024-03-28 DIAGNOSIS — F41.1 GENERALIZED ANXIETY DISORDER: ICD-10-CM

## 2024-03-28 RX ORDER — LORAZEPAM 1 MG/1
1 TABLET ORAL DAILY PRN
Qty: 7 TABLET | Refills: 0 | OUTPATIENT
Start: 2024-03-28 | End: 2024-04-04

## 2024-03-29 ENCOUNTER — OFFICE VISIT (OUTPATIENT)
Dept: PSYCHIATRY | Facility: CLINIC | Age: 54
End: 2024-03-29
Payer: COMMERCIAL

## 2024-03-29 DIAGNOSIS — F41.1 GENERALIZED ANXIETY DISORDER: Primary | ICD-10-CM

## 2024-03-29 DIAGNOSIS — F31.70 BIPOLAR DISORDER IN FULL REMISSION, MOST RECENT EPISODE UNSPECIFIED TYPE (HCC): ICD-10-CM

## 2024-03-29 PROCEDURE — 99214 OFFICE O/P EST MOD 30 MIN: CPT

## 2024-03-29 RX ORDER — LORAZEPAM 1 MG/1
1 TABLET ORAL 2 TIMES DAILY PRN
Qty: 60 TABLET | Refills: 0 | Status: SHIPPED | OUTPATIENT
Start: 2024-03-29 | End: 2024-05-28

## 2024-03-29 NOTE — PSYCH
Psychiatric Medication Management - Behavioral Health   Francois Corrales 53 y.o. male MRN: [id number]    Reason for Visit:   Chief Complaint   Patient presents with    Medication Management       Subjective:  Medication compliance: yes  Medication side effects: Anxiousness    Francois is a 54 y/o male being seen for medication management today. Patient is a client of Dr. Wilde's and was seen by this provider for an emergency visit. Patient has psychiatric diagnoses including bipolar disorder, in remission and generalized anxiety disorder. Patient is currently being observed on Wellbutrin  mg daily, Lexapro 10 mg daily, propranolol 10-20 mg daily and 10 mg as needed, Ambien 12.5 mg at bedtime, Abilify 2.5 mg daily, Lamictal 25 mg at bedtime, and Ativan 1 mg daily as needed for anxiety. Patient is not currently connected to outpatient therapy or additional services at this time.    Patient was recently seen in the emergency room on 3/24/2024 due to increased anxiety. Patient explained to hospital staff that his anxiety has been increased for about 1 month. States that the anxiety worsened when he switched from Lamictal to Abilify about 1 month ago. Patient was prescribed propranolol to help with his increased anxiety by Dr. Wilde. During his hospital visit, patient reported having some suicidal thoughts due to his anxiety being out of control. At the end of this visit, patient was instructed to discontinue his Abilify by taking 5 mg daily, last dose being on 3/30/2024. Patient was restarted on Lamictal 25 mg for 1 week, and then was instructed to increase to 50 mg daily. Francois was also started on Ativan 1 mg daily as needed for anxiety, he was given a 7-day supply.    Francois presents today appearing anxious and restless. Patient has pressured speech and has difficulty with telling this provider what has been going on. Patient's wife was in the room and helped to provide background information for the  "patient. They explain that patient was on Lamictal in the past and it was working well for him, but they tried switching to Abilify due to some increased anxiety concerns. He reports that his anxiety has been the worst it has ever been this past month. Patient was started on propranolol and he believes this might be helping but does not take the edge off like Ativan does. Patient was previously being prescribed Ativan 0.5 mg daily as needed for anxiety and was increased to 1 mg daily by the ER psychiatrist. He reports that this is the only thing that provides him relief during this transition from Abilify back to Lamictal. Patient's wife explains that Francois was crawling out of his skin on Sunday before they could bring him to the ER. Patient has been restless and fidgety for the past week. The only thing providing relief to him is Ativan at this time. Francois reports that he cannot work during this time due to not be able to look at a screen for a long period of time. States that he cannot read or watch TV either because he cannot focus on anything. Patient has been severely restless during the last few weeks. He has not been able to work for 3 weeks. Francois states that the lorazepam lasts for 5 to 6 hours with the 1 mg dose but this only gets him through half a day. Patient has no history of substance use disorder. Patient has been maintained on benzos in the past for his times of increased anxiety and he believes Ativan works the best. Patient was understanding that Ativan is an as needed medication and that he can only take it on this basis. Provided patient education about the side effects of benzodiazepines and patient was understanding. Provided patient with 1 month supply of Ativan 1 mg BID as needed for anxiety to help aid in his transition of Abilify to Lamictal. Patient was very thankful for this.    Francois reports that his mood today is \"anxious\". States that energy and motivation levels have been " adequate, however he is not able to focus on one thing at a time. Appetite remains good and stable. Sleep has been consistent lately and he has been getting 7-8 hours each night with the use of Ambien. Patient reports having some irritability with his anxiety. Denies any recent mood reactivity, frequent crying spells, or elevated moods. Patient denies SI/HI at this time. He reports having some SI before his hospital visit but he has not had any since. Patient denies access to guns or weapons in the house. Denies AH/VH.    Francois is interested in applying for short-term disability due to his increased anxiety and not being able to work. Patient and patient's wife have started filling out the paperwork but had some questions so they were referred to [facility] to help with this. Patient also reports that he is interested in starting therapy again. Francois was in therapy at [facility] previously and wants to reestablish care. Patient was placed on the priority wait list.    Review Of Systems:     Constitutional Negative   ENT Negative   Cardiovascular Negative   Respiratory Negative   Gastrointestinal Negative   Genitourinary Negative   Musculoskeletal Negative   Integumentary Negative   Neurological Negative   Endocrine Negative     Past Medical History:   Patient Active Problem List   Diagnosis    Bipolar disorder (HCC)    Borderline hyperlipidemia    Sleep apnea    Obesity (BMI 30.0-34.9)    Obstructive sleep apnea syndrome    Insomnia    Generalized anxiety disorder    Encounter for long-term (current) use of other medications       Allergies: No Known Allergies    Past Surgical History:   Past Surgical History:   Procedure Laterality Date    CHOLECYSTECTOMY      COLONOSCOPY      HERNIA REPAIR         Past Psychiatric History:   Patient is a previous patient of Andrew Hsieh, now seeing Dr. Wilde     Medication trials: Citalopram 20mg- s/e lethergy;  Hydroxyzine 10mg -s/e sedation  Hospitalizations: Never  "Hospitalized , denies h/o IOP or  PHP  Violence- yes, punching walls and lashing out or breaking things if he's agitated; last occurred in July 2023- acute stressors, never voilence towards others   SIB/SA- denies    Family Psychiatric History:   Aunt - bipolar   Sister- anxiety   Dad- Alcoholism  Mom- Paranoia    Social History:   Patient lives with his wife and 17 y/o daughter     Substance Abuse History:   Tobacco-   never  Illiicit- last used cannabis about 20 yrs ago   Alcohol- couple times per month 2-3 beers , he previously drink 4-5 . Denies h/o blackout seizure, DUI    The following portions of the patient's history were reviewed and updated as appropriate: allergies, current medications, past family history, past medical history, past social history, past surgical history, and problem list.    Objective:  There were no vitals filed for this visit.      Weight (last 2 days)       None          Labs: N/A    Mental status:  Appearance restless and fidgety, dressed in casual clothing, adequate hygiene and grooming, fair eye contact   Mood \"Anxious\"   Affect Appears irritable, stable   Speech Pressured   Thought Processes Linear and goal directed   Associations intact associations   Hallucinations Denies any auditory or visual hallucinations   Thought Content No passive or active suicidal or homicidal ideation, intent, or plan.   Orientation Oriented to person, place, time, and situation   Recent and Remote Memory Grossly intact   Attention Span and Concentration Concentration impaired and Inattentive at times   Intellect Not Formally Assessed   Insight Insight intact   Judgement judgment was intact   Muscle Strength Muscle strength and tone were normal   Language Within normal limits   Fund of Knowledge Age appropriate   Pain None       Assessment/Plan:     Impression:  Generalized anxiety disorder - exacerbation   Bipolar disorder in remission     Discontinue Abilify as directed by psychiatrist in ER "   Continue Lamictal 25 mg daily and increase to 50 mg daily on Sunday as directed by ER psychiatrist  Continue Lorazepam, increase dose to 1 mg BID as needed for increased anxiety. Instructed patient to only take lorazepam as needed and to take 0.5 mg in the afternoon if this is enough. Reviewed side effects of benzodiazepines. Patient was understanding. Patient is aware that the goal is to decrease the use of benzodiazepines after stabilizing his dose of Lamictal.  Continue Lexapro 10 mg daily  Continue Ambien 12.5 mg at bedtime  Continue Wellbutrin  mg daily  Recommended outpatient therapy, will place patient on the wait list  Provided patient's information to [facility] for help with applying for short-term disability  Medical follow up with PCP as needed  Follow up in 2 weeks with Dr. Wilde     Diagnoses:   Diagnoses and all orders for this visit:    Generalized anxiety disorder  -     LORazepam (ATIVAN) 1 mg tablet; Take 1 tablet (1 mg total) by mouth 2 (two) times a day as needed for anxiety    Bipolar disorder in full remission, most recent episode unspecified type (HCC)        Treatment Recommendations:      Risks, Benefits And Possible Side Effects Of Medications:  Risks, benefits, and possible side effects of medications explained to patient and family, they verbalize understanding    Controlled Medication Discussion: The patient has been filling controlled prescriptions on time as prescribed to [place] [employer] Monitoring program.      Treatment Plan: Treatment plan not due at this session.    Visit Time    Visit Start Time: 1140  Visit Stop Time: 1215  Total Visit Duration:  35 minutes      Natali Velazquez PA-C  03/29/24

## 2024-04-02 ENCOUNTER — TELEPHONE (OUTPATIENT)
Dept: PSYCHIATRY | Facility: CLINIC | Age: 54
End: 2024-04-02

## 2024-04-02 NOTE — TELEPHONE ENCOUNTER
Patient called in asking to be connected with therapy services. Patient currently sees Dr Wilde. Patient has been scheduled to see Jaime on 4/16 @ 9am in person.

## 2024-04-08 NOTE — PSYCH
[facility]/Hospital: [facility]   [address] [id number] [phone]    Psychiatric Progress Note  MRN#: [id number]  Francois Corraels 53 y.o. male    This note was not shared with the patient due to reasonable likelihood of causing patient harm   ___________________________________________________________________________________________________________________________________  OFFICE APPOINTMENT   Seen today at [facility] location                                                Patient Francois Corrales , male ,1970   Prescriber/Physician: Nydia Wilde DO (she/her) Physician Location:   [facility] OUTPATIENT  [address] [facility] [id number]       This service was provided in the office.  Patient is currently located in the [place], where I am  licensed.   Patient gave consent to proceed with encounter; acknowledge understanding of security and privacy of encounter   [facility]   Patient verbalized understanding evaluation only involves Psychiatric diagnosing, prescribing, result monitoring   Patient was informed this is a billable service and legal   ___________________________________________________________________________________________________________________________________     Reason For Visit:  Chief Complaint   Patient presents with   • Anxiety     Presented with wife Yo Corrales  Subjective:   Collateral reported he's having difficulties, anxiety, panic attacks , not able to relax or focus    Francois Corrales complained of pacing around a lot , not intentional , although  sleeps 7-8hrs, thoughts are normal otherwise frustration cause of s/e.  Reported on recent ER visit . Chart reviewed, presented due to anxiety ,restlessness, reports of him smoking cannabis to relieve anxiety, also vague SI; was discharged from ER on   Lamotrigine 25mg and Aripiprazole was lowered to 5mg. Afterwards , saw another [facility] clinician in the interim; Lamotrigine was titrated up to 50mg, Lorazepam was titrated up to 2 mg and Aripiprazole was discontinued.    [facility] chart was reviewed during encounter. There's history of nanda symptoms in Feb 21, 2024, defer to note  for details.   Currently he acknowledged then having nanda that later improved with Aripiprazole, otherwise complained of persistent restlessness despite recently stopping Aripiprazole.   Findings of low extremity moving up and down, with  non-pressured dialogue       ROS:  SI/HI : denies  Impulsivity : denies  Irritiable : denies  Anxiety: severity 8 out 10 , Francois Corrales shaking , denies shortness of breath, hyperventilating. Per collateral , he had couple of anxiety      Medications: He's compliant  Lamotrigine 50mg,  Lexapro 10mg , Lorazepam 2mg, Zolpidem CR 12.5, Bupropion 300mg  Med s/e:  defer to above       Counseling given: Not Answered  Tobacco comments: 04/10/2024 Francois Corrales , he denied smoking nicotine or recent marijuana abuse  Alcohol- denies  Tobacco- denies    Medical ROS:   Cardiovascular: negative for chest pain, chest pressure/discomfort, and irregular heart beat  Musculoskeletal:negative for muscle spasms  Neurological: negative for dizziness and recent falls, abnormal limb movements .   Pertinent items are noted in HPI, all other symptoms are negative         Mental Status Evaluation:  General Appearance:  Francois Corrales is a 53 y.o.  male age appropriate, casually dressed,  looks stated age   Behavior:  +Restlessness, anxious appearing , cooperative, appropriate eye gaze   Speech:  normal for rate, rhythm, volume, latency, amount   Mood:  Anxious   Affect:  Anxious    Thought Process:  normal and logical, perseverative    Thought Content:  no overt delusions, normal, somatic preoccupation   Perceptual Disturbances: Does not appear preoccupied or  responding  Delusions  w/o   Risk Potential: Suicidal Ideations  w/o  Homicidal Ideations w/o  Potential for Aggression Yes  , h/o    Sensorium:  Oriented to person, place ( [facility]), time/date ( 2024) and situation   Memory:  recent and remote memory grossly intact   Consciousness:  alert and awake   Attention: attention span and concentration are appropriate   Insight:  appropriate   Judgment: appropriate   Gait/Station: normal   Motor Activity: no abnormal movements     There were no vitals filed for this visit.       Current Outpatient Medications on File Prior to Visit   Medication Sig Dispense Refill   • ARIPiprazole (ABILIFY) 5 mg tablet Take 1 tablet (5 mg total) by mouth daily 30 tablet 0   • buPROPion (WELLBUTRIN XL) 300 mg 24 hr tablet Bupropion ER 300m tablet po daily in the morning 90 tablet 0   • escitalopram (Lexapro) 10 mg tablet Escitalopram 10m tablet po daily 30 tablet 1   • lamoTRIgine (LaMICtal) 25 mg tablet Take 2 tablets (50 mg total) by mouth daily Take one daily for 7 days then 2 daily thereafter 60 tablet 0   • LORazepam (ATIVAN) 1 mg tablet Take 1 tablet (1 mg total) by mouth 2 (two) times a day as needed for anxiety 60 tablet 0   • propranolol (INDERAL) 10 mg tablet Propranolol HCL 10m to 2 tablet po daily , 1 tablet PRN 90 tablet 1   • zolpidem (AMBIEN CR) 12.5 MG CR tablet Zolpidem Tartrate ER 12.5m tablet po daily at night 30 tablet 2     No current facility-administered medications on file prior to visit.        Labs: I have personally reviewed all pertinent laboratory/tests results.   Most Recent Labs:   Lab Results   Component Value Date    WBC 12.77 (H) 2024    RBC 6.25 (H) 2024    HGB 18.0 (H) 2024    HCT 53.9 (H) 2024     2024    RDW 11.9 2024    NEUTROABS 8.31 (H) 2024    SODIUM 136 2024    K 3.9 2024     2024    CO2 28 2024    BUN 18 2024    CREATININE 1.07  03/24/2024    GLUC 105 03/24/2024    CALCIUM 9.4 03/24/2024    AST 24 03/24/2024    ALT 33 03/24/2024    ALKPHOS 77 03/24/2024    TP 7.3 03/24/2024    ALB 4.9 03/24/2024    TBILI 0.90 03/24/2024    CHOLESTEROL 167 04/15/2023    HDL 38 (L) 04/15/2023    TRIG 98 04/15/2023    LDLCALC 110 (H) 04/15/2023    TMZ5FPGJFDAN 3.659 03/24/2024       Labs: I have personally reviewed all pertinent laboratory/tests results. Admission Labs:   Admission on 03/24/2024, Discharged on 03/24/2024   Component Date Value   • Amph/Meth UR 03/24/2024 Negative    • Barbiturate Ur 03/24/2024 Negative    • Benzodiazepine Urine 03/24/2024 Negative    • Cocaine Urine 03/24/2024 Negative    • Methadone Urine 03/24/2024 Negative    • Opiate Urine 03/24/2024 Negative    • PCP Ur 03/24/2024 Negative    • THC Urine 03/24/2024 Negative    • Oxycodone Urine 03/24/2024 Negative    • Color, UA 03/24/2024 Yellow    • Clarity, UA 03/24/2024 Clear    • Specific Gravity, UA 03/24/2024 >=1.030    • pH, UA 03/24/2024 6.0    • Leukocytes, UA 03/24/2024 Negative    • Nitrite, UA 03/24/2024 Negative    • Protein, UA 03/24/2024 Trace (A)    • Glucose, UA 03/24/2024 Negative    • Ketones, UA 03/24/2024 Negative    • Urobilinogen, UA 03/24/2024 <2.0    • Bilirubin, UA 03/24/2024 Negative    • Occult Blood, UA 03/24/2024 Negative    • WBC 03/24/2024 12.77 (H)    • RBC 03/24/2024 6.25 (H)    • Hemoglobin 03/24/2024 18.0 (H)    • Hematocrit 03/24/2024 53.9 (H)    • MCV 03/24/2024 86    • MCH 03/24/2024 28.8    • MCHC 03/24/2024 33.4    • RDW 03/24/2024 11.9    • MPV 03/24/2024 9.2    • Platelets 03/24/2024 250    • nRBC 03/24/2024 0    • Segmented % 03/24/2024 65    • Immature Grans % 03/24/2024 2    • Lymphocytes % 03/24/2024 21    • Monocytes % 03/24/2024 8    • Eosinophils Relative 03/24/2024 3    • Basophils Relative 03/24/2024 1    • Absolute Neutrophils 03/24/2024 8.31 (H)    • Absolute Immature Grans 03/24/2024 0.25 (H)    • Absolute Lymphocytes 03/24/2024 2.67     • Absolute Monocytes 03/24/2024 1.05    • Eosinophils Absolute 03/24/2024 0.37    • Basophils Absolute 03/24/2024 0.12 (H)    • Sodium 03/24/2024 136    • Potassium 03/24/2024 3.9    • Chloride 03/24/2024 102    • CO2 03/24/2024 28    • ANION GAP 03/24/2024 6    • BUN 03/24/2024 18    • Creatinine 03/24/2024 1.07    • Glucose 03/24/2024 105    • Calcium 03/24/2024 9.4    • AST 03/24/2024 24    • ALT 03/24/2024 33    • Alkaline Phosphatase 03/24/2024 77    • Total Protein 03/24/2024 7.3    • Albumin 03/24/2024 4.9    • Total Bilirubin 03/24/2024 0.90    • eGFR 03/24/2024 79    • TSH 3RD GENERATON 03/24/2024 3.659    • Ethanol Lvl 03/24/2024 <10    • RBC, UA 03/24/2024 0-1    • WBC, UA 03/24/2024 2-4    • Epithelial Cells 03/24/2024 Occasional    • Bacteria, UA 03/24/2024 Occasional    • MUCUS THREADS 03/24/2024 Innumerable (A)    • Ventricular Rate 03/24/2024 75    • Atrial Rate 03/24/2024 75    • GA Interval 03/24/2024 148    • QRSD Interval 03/24/2024 98    • QT Interval 03/24/2024 382    • QTC Interval 03/24/2024 426    • P Axis 03/24/2024 17    • QRS Axis 03/24/2024 0    • T Wave Axis 03/24/2024 -2        ________________________________________________________________________    A/P  David Martel,is a 53 y.o.  male, h/o cannabis abuse and alcoholism, now sober > 10yrs, h/o bipolar disorder, anxiety , who presented with labile mood in the setting situational stressor; with findings of  anticipatory anxiety, worries , Positive response to Lexapro. Interim events of nanda , was started on low dose Aripiprazole, recently stopped due to restlessness, and was started on Lamotrigine with higher dose Ativan. He presents currently of intense anxiety, limb shakes , distractibility reported on and frustration; likely also unsolved nanda cause of Lexapro.      DSM5  1. Bipolar I disorder, most recent episode (or current) manic (HCC)    2. Generalized anxiety disorder    3. Insomnia, unspecified type          PLAN:    History and external records was reviewed.   Labs 2024 WNL CBC diff, CMP, TSH, UA, negative UTOX, EKG NSR   Discussed clinical findings ,diagnotic impression: akathisia and unresolved nanda  Discontinued Lexapro 10mg , was informed of taper instructions, pt Francois Corrales was accepting of plan  Also Increased Propranolol 20mg, 1-2 tablet ,x 1 PRN for akathisia and acute anxiety  Did not change other medications    CRISIS plan and treatment plan was not completed due to intensity of acute pathology, pt Francois Corrales was informed of plans [facility]; MIS was approved      Medications Prescribed During [facility] Encounter     -     propranolol (INDERAL) 20 mg tablet; Propranolol 20m- 2 tablets po daily, 1 tablet PRN. Hold if BP < 100/<60    Medication List Also:    -     Lamotrigine 50m tablet daily   -     zolpidem (AMBIEN CR) 12.5 MG CR tablet; Zolpidem Tartrate ER 12.5m tablet po daily at night  -     LORazepam (ATIVAN) 0.5 mg tablet; Lorazepam 0.5m tablet po daily PRN      Pending Labs   -     Lipid Panel with Direct LDL reflex;          Treatement: Risks, benefits, and possible side effects of medications explained to patient and patient verbalizes understanding.        Next Appt @ [facility]: 1 month   _______________________________________________________________________________________________  Patient Encounter : 3:09- 3:40            Documenting 3:41- 4:16    Encounter Duration: Time Spent 31 min with Patient.Greater than 50% of total time was spent with the patient      MDM  Number of Diagnoses or Management Options  Diagnosis management comments: 3       Amount and/or Complexity of Data Reviewed  Clinical lab tests: reviewed  Obtain history from someone other than the patient: yes  Review and summarize past medical records: yes    Risk of Complications, Morbidity, and/or Mortality  Presenting problems: moderate  Diagnostic procedures:  moderate  Management options: moderate       This note may have been written with the assistance of dictation software. Please excuse any grammatical  errors, misspellings,  and abnormal spacing of letters , sentences or paragraphs . For accurate interpretation should read note horizontally

## 2024-04-10 ENCOUNTER — OFFICE VISIT (OUTPATIENT)
Dept: PSYCHIATRY | Facility: CLINIC | Age: 54
End: 2024-04-10
Payer: COMMERCIAL

## 2024-04-10 DIAGNOSIS — F31.10 BIPOLAR I DISORDER, MOST RECENT EPISODE (OR CURRENT) MANIC (HCC): Primary | ICD-10-CM

## 2024-04-10 DIAGNOSIS — F41.1 GENERALIZED ANXIETY DISORDER: ICD-10-CM

## 2024-04-10 DIAGNOSIS — G47.00 INSOMNIA, UNSPECIFIED TYPE: ICD-10-CM

## 2024-04-10 PROCEDURE — 99214 OFFICE O/P EST MOD 30 MIN: CPT | Performed by: PSYCHIATRY & NEUROLOGY

## 2024-04-10 RX ORDER — PROPRANOLOL HYDROCHLORIDE 20 MG/1
TABLET ORAL
Qty: 90 TABLET | Refills: 1 | Status: SHIPPED | OUTPATIENT
Start: 2024-04-10

## 2024-04-10 NOTE — PATIENT INSTRUCTIONS
Francois Corrales [id number]   Thanks for presenting to today's Appointment at [facility]  Lexapro was discontinued   Propranolol  dose was increased to Propranolol  20mg, 1-2 tablet , 1 tablet PRN  Your other medications were not changed

## 2024-04-11 ENCOUNTER — TELEPHONE (OUTPATIENT)
Dept: PSYCHIATRY | Facility: CLINIC | Age: 54
End: 2024-04-11

## 2024-04-16 ENCOUNTER — OFFICE VISIT (OUTPATIENT)
Dept: BEHAVIORAL/MENTAL HEALTH CLINIC | Facility: CLINIC | Age: 54
End: 2024-04-16
Payer: COMMERCIAL

## 2024-04-16 DIAGNOSIS — F31.10 BIPOLAR I DISORDER, MOST RECENT EPISODE (OR CURRENT) MANIC (HCC): Primary | ICD-10-CM

## 2024-04-16 PROCEDURE — 90791 PSYCH DIAGNOSTIC EVALUATION: CPT | Performed by: COUNSELOR

## 2024-04-16 NOTE — PSYCH
Behavioral Health Psychotherapy Assessment    Date of Initial Psychotherapy Assessment: 04/16/24  Referral Source: [facility]  Has a release of information been signed for the referral source? NA    Preferred Name: Francois Corrales  Preferred Pronouns: He/him  YOB: 1970 Age: 53 y.o.  Sex assigned at birth: male   Gender Identity: Male  Race:   Preferred Language: English    Emergency Contact:  Full Name: Mrs Corrales  Relationship to Client: Spouse  Contact information: In Cell    Primary Care Physician:  ALEK Horan  [address] [address]  [phone]  Has a release of information been signed? Yes    Physical Health History:  Past surgical procedures: N/A  Do you have a history of any of the following: N/A  Do you have any mobility issues? No    Relevant Family History:  [id number]  Mother-paranoia  Sister-unknown Dx  Father- Alcoholism    Presenting Problem (What brings you in?)  Bi-polar I recently presented to ER with severe anxiety x 1 week. Reportedly increased when  Changed his medications from Lamictal to Abilify    Mental Health Advance Directive:  Do you currently have a Mental Health Advance Directive?no    Diagnosis:   Diagnosis ICD-10-CM Associated Orders   1. Bipolar I disorder, most recent episode (or current) manic ([facility])  F31.10           Initial Assessment:     Current Mental Status:    Appearance: appropriate and casual      Behavior/Manner: cooperative and restless      Affect/Mood:  Anxious and manic    Speech:  Normal    Sleep:  Interrupted    Oriented to: oriented to self, oriented to place and oriented to time       Clinical Symptoms    Luli: yes      Luli Symptoms: distractibility and psychomotor agitation      Have you ever been assaultive to others or the environment: No      Have you ever been self-injurious: No      Counseling History:  Previous Counseling or Treatment  (Mental Health or Drug & Alcohol): Yes     Previous Counseling Details:  [facility] and other places that client cannot remember  Have you previously taken psychiatric medications: Yes    Previous Medications Attempted:  Cannot ;list    Suicide Risk Assessment  Have you ever had a suicide attempt: No    Have you had incidents of suicidal ideation: No    Are you currently experiencing suicidal thoughts: No      Substance Abuse/Addiction Assessment:  Alcohol: No    Heroin: No    Fentanyl: No    Opiates: No    Cocaine: No    Amphetamines: No    Hallucinogens: No    Club Drugs: No    Benzodiazepines: No    Other Rx Meds: No    Marijuana: No    Tobacco/Nicotine: No    Have you experienced blackouts as a result of substance use: No    Have you had any periods of abstinence: No    Have you experienced symptoms of withdrawal: No    Have you ever overdosed on any substances?: No    Are you currently using any Medication Assisted Treatment for Substance Use: No      Compulsive Behaviors:  Compulsive Behavior Information:  N/A    Disordered Eating History:  Do you have a history of disordered eating: No      Social Determinants of Health:    SDOH:  Financial instability, stress and other    Other SDOH:  Employment    Trauma and Abuse History:    Have you ever been abused: No      Legal History:    Have you ever been arrested  or had a DUI: No      Have you been incarcerated: No      Are you currently on parole/probation: No      Any current Children and Youth involvement: No      Any pending legal charges: No      Relationship History:    Current marital status:       Relationship History:   21 years with 16 yr old daughter    Employment History    Are you currently employed: No      Currently seeking employment: No      Future work goals:  On Short-term disability    Sources of income/financial support:  Family members     History:      Status: no history of  duty  Educational History:     Have you ever been diagnosed with a learning  disability: No      Highest level of education:  Master's Degree    Have you ever had an IEP or 504-plan: No      Do you need assistance with reading or writing: No      Recommended Treatment:     Psychotherapy:  Individual sessions    Frequency:  2 times    Session frequency:  Monthly    Visit start and stop times:    04/16/24  Start Time: 0850  Stop Time: 0945  Total Visit Time: 55 minutesOutpatient Behavioral Health Psychotherapy Treatment Plan    Francois Corrales  1970

## 2024-04-19 ENCOUNTER — TELEPHONE (OUTPATIENT)
Dept: PSYCHIATRY | Facility: CLINIC | Age: 54
End: 2024-04-19

## 2024-04-19 DIAGNOSIS — G47.00 INSOMNIA, UNSPECIFIED TYPE: ICD-10-CM

## 2024-04-19 DIAGNOSIS — F31.10 BIPOLAR I DISORDER, MOST RECENT EPISODE (OR CURRENT) MANIC (HCC): Primary | ICD-10-CM

## 2024-04-19 DIAGNOSIS — F41.1 GENERALIZED ANXIETY DISORDER: ICD-10-CM

## 2024-04-19 NOTE — TELEPHONE ENCOUNTER
Pt Francois Corrales , 1970 , [facility] chart was reviewed.,Completed forms regarding Jacob Corrales request for disablity. Form was put in enveloped , toward [facility]  . Please call Francois Corrales to inform him form was completed.      This note was not shared with the patient due to reasonable likelihood of causing patient harm

## 2024-04-19 NOTE — PSYCH
"Psychiatric Medication Management - Behavioral Health   Francois Corrales 53 y.o. male MRN: [id number]    Reason for Visit:   Chief Complaint   Patient presents with    Medication Management       Subjective:  Medication compliance: yes  Medication side effects: hayde Parrish is a 52 y/o male being seen for transfer of care appointment today, patient is a previous client of Dr. Wilde, last appointment on 4/10/2024. Patient was previously seen by this provider for an emergency visit on 3/29/2024. Patient has psychiatric diagnoses including bipolar disorder and generalized anxiety disorder. Patient is currently being observed on Wellbutrin  mg daily, propranolol 20 mg BID, Ambien 12.5 mg at bedtime, Lamictal 50 mg at bedtime, and Ativan 1 mg BID as needed for anxiety. Patient is currently connected to outpatient therapy at [facility]. No additional services in place at this time.    Patient presents today with \"pretty good\" mood. He appears with bright affect and is calm on initial presentation. Reports that the last couple days have been a significant improvement. States that he no longer has the agitation and restlessness that he was experiencing before. Reports that he has generally slowed down and have even been able to rest or take a nap the past few days. Patient appears more calm and less fidgety during today's appointment. Reports that he has been taking Ativan as needed daily but has a goal to decrease the use in the next couple weeks. Explains that he has been splitting the Ativan in half and taking the medication 3-4 times a day instead of twice. Encouraged him to try to decrease this use. Reports that the Lamictal was increased to 50 mg on 3/31 and that he is interested in increasing this dose during today's session. Sleep has been good, reports that it is solid and restful sleep for about 7 to 8 hours every night. Energy and motivation levels have been decent. Explains that he has high " energy and his motivation is good when he can concentrate on things. Patient is motivated to go back to work soon. Appetite remains good, states that it is normal and he eats about 3 meals a day. Reports that he has had some mood swings due to frustration of not getting better. Reports that he thinks this is improving. Denies any recent crying spells, elevated moods, aggression, and irritability recently. Patient has a history of mood swings but denies any significant nanda episodes in the past. Reports that he was previously on 200 mg of Lamictal daily and this worked well for him. Explains that he was having some challenges and they switched to Abilify to see if this helped but he became more restless. Patient is comfortable with increasing the dose of Lamictal today and staying on this medication at this time. Reports that his other medications are working well and there is no need to change the dosing at this time. States that the propranolol has made a huge difference and he is taking 10 mg every 4-5 hours. Patient explains that this dosing works better than taking 20 mg twice a day. Encouraged patient to watch out for signs of low blood pressure and he was understanding. Patient recently started therapy last week and will be following up next week. Reports that his first session went well. Patient rates his depression a 2/10 on severity scale today and rates anxiety 4/10 on the same scale. Patient denies SI/HI. Denies access to guns or weapons. Denies AH/VH.    Review Of Systems:     Constitutional Negative   ENT Negative   Cardiovascular Negative   Respiratory Negative   Gastrointestinal Negative   Genitourinary Negative   Musculoskeletal Negative   Integumentary Negative   Neurological Negative   Endocrine Negative     Past Medical History:   Patient Active Problem List   Diagnosis    Bipolar disorder (HCC)    Borderline hyperlipidemia    Sleep apnea    Obesity (BMI 30.0-34.9)    Obstructive sleep apnea  "syndrome    Insomnia    Generalized anxiety disorder    Encounter for long-term (current) use of other medications    Bipolar I disorder, most recent episode (or current) manic (HCC)       Allergies: No Known Allergies    Past Surgical History:   Past Surgical History:   Procedure Laterality Date    CHOLECYSTECTOMY      COLONOSCOPY      HERNIA REPAIR         Past Psychiatric History:   Patient is a previous patient of Andrew Hsieh and Dr. Wilde     Medication trials: Citalopram 20mg- s/e lethergy;  Hydroxyzine 10mg -s/e sedation, Lexapro, Abilify (increased anxiety)   Hospitalizations: Never Hospitalized , denies h/o IOP or  PHP  ER visit for increased anxiety in March 2024   Violence- yes, punching walls and lashing out or breaking things if he's agitated; last occurred in July 2023- acute stressors, never voilence towards others   SIB/SA- denies    Family Psychiatric History:   Aunt - bipolar   Sister- anxiety   Dad- Alcoholism  Mom- Paranoia    Social History:   Patient lives with his wife and 17 y/o daughter   Occupation =    Hobbies = cars     Substance Abuse History:   Tobacco - never  Illicit - last used cannabis about 20 yrs ago   Alcohol - couple times per month 2-3 beers , he previously drink 4-5 . Denies h/o blackout seizure, DUI    The following portions of the patient's history were reviewed and updated as appropriate: allergies, current medications, past family history, past medical history, past social history, past surgical history, and problem list.    Objective:  There were no vitals filed for this visit.      Weight (last 2 days)       None          Labs: Labs 03/24/2024 WNL CBC diff, CMP, TSH, UA, negative UTOX, EKG NSR     Mental status:  Appearance restless and fidgety at times, dressed in casual clothing, adequate hygiene and grooming, fair eye contact   Mood \"Pretty good\"   Affect Appears generally euthymic, stable   Speech Normal rate and rhythm   Thought " Processes Linear and goal directed   Associations intact associations   Hallucinations Denies any auditory or visual hallucinations   Thought Content No passive or active suicidal or homicidal ideation, intent, or plan.   Orientation Oriented to person, place, time, and situation   Recent and Remote Memory Grossly intact   Attention Span and Concentration Concentration intact, Inattentive at times   Intellect Not Formally Assessed   Insight Insight intact   Judgement judgment was intact   Muscle Strength Muscle strength and tone were normal   Language Within normal limits   Fund of Knowledge Age appropriate   Pain None       Assessment/Plan:     Impression:  Generalized anxiety disorder - improving    Bipolar disorder - variable      Increase to Lamictal 100 mg daily to continue to help with mood stabilization   Continue Lorazepam 1 mg BID as needed for increased anxiety, goal to decrease use in 1-2 months   Continue Ambien 12.5 mg at bedtime for insomnia concerns  Continue Wellbutrin  mg daily for depressive symptoms  Continue propranolol 20 mg BID as needed for anxiety   Recommended continuation of outpatient therapy at [facility]   Medical follow up with PCP as needed  Follow up in 4 weeks      Diagnoses:   Diagnoses and all orders for this visit:    Bipolar disorder in full remission, most recent episode unspecified type (HCC)    Generalized anxiety disorder  -     zolpidem (AMBIEN CR) 12.5 MG CR tablet; Zolpidem Tartrate ER 12.5m tablet po daily at night  -     LORazepam (ATIVAN) 1 mg tablet; Take 1 tablet (1 mg total) by mouth 2 (two) times a day as needed for anxiety Do not start before 2024.    Bipolar 1 disorder, currently manic, mild (HCC)  -     zolpidem (AMBIEN CR) 12.5 MG CR tablet; Zolpidem Tartrate ER 12.5m tablet po daily at night  -     lamoTRIgine (LaMICtal) 100 mg tablet; Take 1 tablet (100 mg total) by mouth daily    Bipolar I disorder in remission (HCC)  -      lamoTRIgine (LaMICtal) 100 mg tablet; Take 1 tablet (100 mg total) by mouth daily  -     buPROPion (WELLBUTRIN XL) 300 mg 24 hr tablet; Bupropion ER 300m tablet po daily in the morning          Treatment Recommendations:      Risks, Benefits And Possible Side Effects Of Medications:  Risks, benefits, and possible side effects of medications explained to patient and family, they verbalize understanding    Controlled Medication Discussion: The patient has been filling controlled prescriptions on time as prescribed to [place] Prescription Drug Monitoring program.      Treatment Plan: Both a treatment plan and a crisis plan were completed at today's visit. Patient verbally consented and signed both forms while in the office today. Next treatment plan due 10/22/2024.    Visit Time    Visit Start Time: 1130  Visit Stop Time: 1205  Total Visit Duration:  35 minutes      aNtali Velazquez PA-C  24

## 2024-04-22 ENCOUNTER — OFFICE VISIT (OUTPATIENT)
Dept: PSYCHIATRY | Facility: CLINIC | Age: 54
End: 2024-04-22
Payer: COMMERCIAL

## 2024-04-22 DIAGNOSIS — F31.70 BIPOLAR DISORDER IN FULL REMISSION, MOST RECENT EPISODE UNSPECIFIED TYPE (HCC): Primary | ICD-10-CM

## 2024-04-22 DIAGNOSIS — F31.11 BIPOLAR AFFECTIVE DISORDER, CURRENTLY MANIC, MILD (HCC): ICD-10-CM

## 2024-04-22 DIAGNOSIS — F41.1 GENERALIZED ANXIETY DISORDER: ICD-10-CM

## 2024-04-22 DIAGNOSIS — F31.70 BIPOLAR I DISORDER IN REMISSION (HCC): ICD-10-CM

## 2024-04-22 PROCEDURE — 99214 OFFICE O/P EST MOD 30 MIN: CPT

## 2024-04-22 RX ORDER — LORAZEPAM 1 MG/1
1 TABLET ORAL 2 TIMES DAILY PRN
Qty: 60 TABLET | Refills: 0 | Status: SHIPPED | OUTPATIENT
Start: 2024-04-27 | End: 2024-06-26

## 2024-04-22 RX ORDER — BUPROPION HYDROCHLORIDE 300 MG/1
TABLET ORAL
Qty: 90 TABLET | Refills: 1 | Status: SHIPPED | OUTPATIENT
Start: 2024-04-22

## 2024-04-22 RX ORDER — ZOLPIDEM TARTRATE 12.5 MG/1
TABLET, FILM COATED, EXTENDED RELEASE ORAL
Qty: 30 TABLET | Refills: 2 | Status: SHIPPED | OUTPATIENT
Start: 2024-04-22

## 2024-04-22 RX ORDER — LAMOTRIGINE 100 MG/1
100 TABLET ORAL DAILY
Qty: 30 TABLET | Refills: 2 | Status: SHIPPED | OUTPATIENT
Start: 2024-04-22

## 2024-04-22 NOTE — BH CRISIS PLAN
Client Name: Francois Corrales       Client YOB: 1970    [place] Safety Plan      Creation Date: 4/22/24 Update Date: 4/22/24   Created By: Natali Velazquez PA-C Last Updated By: Natali Velazquez PA-C      Step 1: Warning Signs:   Warning Signs   depression - hard time leaving bed   avoiding people   nanda - more fidgety            Step 2: Internal Coping Strategies:   Internal Coping Strategies   weighted blanket   bedroom rest/peaceful environment   walking            Step 3: People and social settings that provide distraction:   Name Contact Information   Cousin Kirti number in cell phone    Places   Home   Driving back roads           Step 4: People whom I can ask for help during a crisis:      Name Contact Information    Wife (Yo) number in cell phone    Cousin Kirti number in cell phone      Step 5: Professionals or agencies I can contact during a crisis:      [facility]inican/Agency Name Phone Emergency Contact    Natali Velazquez PA-C [phone]       Local Emergency Department Emergency Department Phone Emergency Department Address    911          Crisis Phone Numbers:   Suicide Prevention Lifeline: Call or Text  740 Crisis Text Line: Text HOME to 619-070   Please note: [facility] do not have a separate number for Child/Adolescent specific crisis. If your county is not listed under Child/Adolescent, please call the adult number for your county      Adult Crisis Numbers: Child/Adolescent Crisis Numbers   [facility]: [phone] [facility]: [phone]   [facility]: [phone] [facility]: [phone]   [facility]: [phone] [place]: [phone]   [facility]: [phone] [place]: [phone]   [place]: [phone]   [facility]: [phone]   [facility]: [phone]   [facility]: [phone] (daytime) [phone] (after hours, weekends, holidays)      Step 6: Making  the environment safer (plan for lethal means safety):   Plan: No firearms in the house      Optional: What is most important to me and worth living for?   Family      [place] Safety Plan. Elsy Ramon and Merlin Kramer. Used with permission of the authors.

## 2024-04-22 NOTE — BH TREATMENT PLAN
TREATMENT PLAN (Medication Management Only)        [facility]    Name and Date of Birth:  Francois Corrales 53 y.o. 1970  Date of Treatment Plan: April 22, 2024  Diagnosis/Diagnoses:    1. Bipolar disorder in full remission, most recent episode unspecified type (HCC)    2. Generalized anxiety disorder      Strengths/Personal Resources for Self-Care: supportive family, taking medications as prescribed.  Area/Areas of need (in own words): anxiety symptoms, mood instability  1. Long Term Goal: continue improvement in mood stability.  Target Date:6 months - 10/22/2024  Person/Persons responsible for completion of goal: Francois  2.  Short Term Objective (s) - How will we reach this goal?:   A. Provider new recommended medication/dosage changes and/or continue medication(s): continue current medications as prescribed.  B. Attend medication management appointments regularly.  C. Attend psychotherapy regularly.  Target Date:6 months - 10/22/2024  Person/Persons Responsible for Completion of Goal: Francois  Progress Towards Goals: continuing treatment  Treatment Modality: medication management every 1 months  Review due 180 days from date of this plan: 6 months - 10/22/2024  Expected length of service: ongoing treatment  My Physician/PA/NP and I have developed this plan together and I agree to work on the goals and objectives. I understand the treatment goals that were developed for my treatment.

## 2024-04-30 ENCOUNTER — SOCIAL WORK (OUTPATIENT)
Dept: BEHAVIORAL/MENTAL HEALTH CLINIC | Facility: CLINIC | Age: 54
End: 2024-04-30
Payer: COMMERCIAL

## 2024-04-30 DIAGNOSIS — F31.9 BIPOLAR AFFECTIVE DISORDER, REMISSION STATUS UNSPECIFIED (HCC): Primary | ICD-10-CM

## 2024-04-30 PROCEDURE — 90837 PSYTX W PT 60 MINUTES: CPT | Performed by: COUNSELOR

## 2024-04-30 NOTE — BH TREATMENT PLAN
Outpatient Behavioral Health Psychotherapy Treatment Plan    Francois Corrales  1970     Date of Initial Psychotherapy Assessment: 4/16/2024   Date of Current Treatment Plan: 04/30/24  Treatment Plan Target Date: 1/16/2025  Treatment Plan Expiration Date: 1/16/2025    Diagnosis:   Bi-polar D/O    Area(s) of Need: Medication management, Control of emotions, Employment    Long Term Goal 1 (in the client's own words): I need to make sure my medication keeps me stable    Stage of Change: Action    Target Date for completion: ongoing     Anticipated therapeutic modalities: PA, therapist     People identified to complete this goal: PA, therapist      Objective 1: (identify the means of measuring success in meeting the objective): Take meds as prescribed      Objective 2: (identify the means of measuring success in meeting the objective): Meet with PA as required      Long Term Goal 2 (in the client's own words): I need to learn how to control my emotions better    Stage of Change: Action    Target Date for completion: 1/30/ 2025    Anticipated therapeutic modalities: PA, Therapist, client     People identified to complete this goal: PA, Therapist, client      Objective 1: (identify the means of measuring success in meeting the objective): maintain a log of emotions to explore themes      Objective 2: (identify the means of measuring success in meeting the objective): Utilize Grounding 5,4,3,2,1, to assist with calming self     Long Term Goal 3 (in the client's own words): Improving with job    Stage of Change: Action    Target Date for completion: ongoing     Anticipated therapeutic modalities: employer, therapist     People identified to complete this goal: employer, therapist      Objective 1: (identify the means of measuring success in meeting the objective): Critique difficult assignments to improve      Objective 2: (identify the means of measuring success in meeting the objective): Practice positive self-talk  script     I am currently under the care of a [facility] psychiatric provider: yes    My [facility] psychiatric provider is: ALISHA Mason    I am currently taking psychiatric medications: Yes, as prescribed    I feel that I will be ready for discharge from mental health care when I reach the following (measurable goal/objective): when stable    For children and adults who have a legal guardian:   Has there been any change to custody orders and/or guardianship status? NA. If yes, attach updated documentation.    I have created my Crisis Plan and have been offered a copy of this plan    Behavioral Health Treatment Plan [facility]: Diagnosis and Treatment Plan explained to Francois Corrales acknowledges an understanding of their diagnosis. Francois Corrales agrees to this treatment plan.    I have been offered a copy of this Treatment Plan. yes

## 2024-04-30 NOTE — PSYCH
"Behavioral Health Psychotherapy Progress Note    Psychotherapy Provided: {PSYCHOTHERAPY:49042}    No diagnosis found.    Goals addressed in session: {GOALS:96267}     DATA: ***  During this session, this clinician used the following therapeutic modalities: {Therapeutic Modalities:63296}    Substance Abuse {Was/was not:00861} addressed during this session. If the client is diagnosed with a co-occurring substance use disorder, please indicate any changes in the frequency or amount of use: ***. Stage of change for addressing substance use diagnoses: {Psych Substance Abuse Stage of Change:57954}    ASSESSMENT:  Francois Corrales presents with a {Psych/BH Mood:90722::\"Euthymic/ normal\"} mood.     his affect is {Psych/BH Affect:16069::\"Normal range and intensity\"}, which is {Congruent/Non Congruent:25582}, with his mood and the content of the session. The client {HAS/HAS NOT:63183} made progress on their goals.    *** Francois Corrales presents with a {PSYCH Suicide/Homicide Risk:94780} risk of suicide, {PSYCH Suicide/Homicide Risk:23722} risk of self-harm, and {PSYCH Suicide/Homicide Risk:55555} risk of harm to others.    For any risk assessment that surpasses a \"low\" rating, a safety plan must be developed.    A safety plan was indicated: {YES/NO:20200}  If yes, describe in detail ***    PLAN: Between sessions, Francois Corrales will ***. At the next session, the therapist will use {Therapeutic Modalities:40694} to address ***.    Behavioral Health Treatment Plan and Discharge Planning: Francois Corrales is aware of and agrees to continue to work on their treatment plan. They have identified and are working toward their discharge goals. {YES/NO:20200}    Visit start and stop times:    04/30/24     "

## 2024-04-30 NOTE — PSYCH
"Behavioral Health Psychotherapy Progress Note    Psychotherapy Provided: Individual Psychotherapy     1. Bipolar affective disorder, remission status unspecified (HCC)            Goals addressed in session: Goal 1     DATA: Met with Robert and created a Tx Plan based on his identified needs and problem areas. Robert identified medication management, control of emotions and employment as his biggest deficits.   During this session, this clinician used the following therapeutic modalities: Client-centered Therapy    Substance Abuse was not addressed during this session. If the client is diagnosed with a co-occurring substance use disorder, please indicate any changes in the frequency or amount of use: Stage of change for addressing substance use diagnoses: No substance use/Not applicable    ASSESSMENT:  Francois Corrales presents with a Euthymic/ normal mood.     his affect is Normal range and intensity, which is congruent, with his mood and the content of the session. The client has made progress on their goals.    Francois Corrales presents with a none risk of suicide, none risk of self-harm, and none risk of harm to others.    For any risk assessment that surpasses a \"low\" rating, a safety plan must be developed.    A safety plan was indicated: no  If yes, describe in detail N/A    PLAN: Between sessions, Francois Corrales will begin to log emotion range. At the next session, the therapist will use Client-centered Therapy to address regulation of emotions.    Behavioral Health Treatment Plan and Discharge Planning: Francois Corrales is aware of and agrees to continue to work on their treatment plan. They have identified and are working toward their discharge goals. yes    Visit start and stop times:    04/30/24  Start Time: 0900  Stop Time: 0953  Total Visit Time: 53 minutes  "

## 2024-05-13 NOTE — PSYCH
"Psychiatric Medication Management - Behavioral Health   Francois Corrales 53 y.o. male MRN: [id number]    Reason for Visit:   Chief Complaint   Patient presents with    Medication Management       Subjective:  Medication compliance: yes  Medication side effects: hayde Parrish is a 54 y/o male being seen for medication management today. Patient has psychiatric diagnoses including bipolar disorder and generalized anxiety disorder. Patient is currently being observed on Wellbutrin  mg daily, propranolol 20 mg BID, Ambien 12.5 mg at bedtime, Lamictal 100 mg at bedtime, and Ativan 1 mg BID as needed for anxiety. Patient is currently connected to outpatient therapy at [facility]. No additional services in place at this time.    Patient presents today reporting \"pretty good\" mood. States that his wife has been telling him he is grumpy lately but he believes this is due to the stress at work. Reports that the start back to work went generally well. States that sleep is still good with use of Ambien. Patient has been getting about 7 to 8 hours each night. Energy and motivation levels have been slightly lower than average but adequate. Appetite remains good, eating 3 meals a day. Patient denies any significant mood swings, elevated moods, frequent crying spells, or aggression. He states that he has some irritability but it is tolerable. Patient rates his anxiety at 2-3/10 on a severity scale today and he rates his depression a 5/10. Previously, patient rated his depression a 2/10 and his anxiety a 4/10. Patient reports that his anxiety has been better and he has only been taking 1 Ativan tablet daily. His goal is to continue to decrease this dose. Regarding his Lamictal, patient is interested in increasing to 150 mg at this time. Patient was previously stable on 200 mg of Lamictal daily. Francois states that therapy continues to go well. He denies SI/HI. Denies access to guns or weapons. Denies AH/VH. Patient " has no major questions or concerns at this time.    Review Of Systems:     Constitutional Negative   ENT Negative   Cardiovascular Negative   Respiratory Negative   Gastrointestinal Negative   Genitourinary Negative   Musculoskeletal Negative   Integumentary Negative   Neurological Negative   Endocrine Negative     Past Medical History:   Patient Active Problem List   Diagnosis    Bipolar disorder (HCC)    Borderline hyperlipidemia    Sleep apnea    Obesity (BMI 30.0-34.9)    Obstructive sleep apnea syndrome    Insomnia    Generalized anxiety disorder    Encounter for long-term (current) use of other medications    Bipolar I disorder, most recent episode (or current) manic (HCC)       Allergies: No Known Allergies    Past Surgical History:   Past Surgical History:   Procedure Laterality Date    CHOLECYSTECTOMY      COLONOSCOPY      HERNIA REPAIR         Past Psychiatric History:   Patient is a previous patient of Andrew Hsieh and Dr. Wilde     Medication trials: Citalopram 20mg- s/e lethergy;  Hydroxyzine 10mg -s/e sedation, Lexapro, Abilify (increased anxiety)   Hospitalizations: Never Hospitalized , denies h/o IOP or  PHP  ER visit for increased anxiety in March 2024   Violence- yes, punching walls and lashing out or breaking things if he's agitated; last occurred in July 2023- acute stressors, never voilence towards others   SIB/SA- denies    Family Psychiatric History:   Aunt - bipolar   Sister- anxiety   Dad- Alcoholism  Mom- Paranoia    Social History:   Patient lives with his wife and 15 y/o daughter   Occupation =    Hobbies = cars     Substance Abuse History:   Tobacco - never  Illicit - last used cannabis about 20 yrs ago   Alcohol - couple times per month 2-3 beers , he previously drink 4-5 . Denies h/o blackout seizure, DUI    The following portions of the patient's history were reviewed and updated as appropriate: allergies, current medications, past family history, past  "medical history, past social history, past surgical history, and problem list.    Objective:  There were no vitals filed for this visit.      Weight (last 2 days)       None          Labs: Labs 03/24/2024 WNL CBC diff, CMP, TSH, UA, negative UTOX, EKG NSR     Mental status:  Appearance restless and fidgety at times, dressed in casual clothing, adequate hygiene and grooming, fair eye contact   Mood \"Pretty good\"   Affect Appears generally euthymic, stable   Speech Normal rate and rhythm   Thought Processes Linear and goal directed   Associations intact associations   Hallucinations Denies any auditory or visual hallucinations   Thought Content No passive or active suicidal or homicidal ideation, intent, or plan.   Orientation Oriented to person, place, time, and situation   Recent and Remote Memory Grossly intact   Attention Span and Concentration Concentration intact, Inattentive at times   Intellect Not Formally Assessed   Insight Insight intact   Judgement judgment was intact   Muscle Strength Muscle strength and tone were normal   Language Within normal limits   Fund of Knowledge Age appropriate   Pain None       Assessment/Plan:     Impression:  Generalized anxiety disorder - improving    Bipolar disorder - variable      Increase to Lamictal 150 mg daily to continue to help with mood stabilization  Decrease to Lorazepam 1 mg daily as needed for increased anxiety   Continue Ambien 12.5 mg at bedtime for insomnia concerns  Continue Wellbutrin  mg daily for depressive symptoms  Continue propranolol 20 mg BID as needed for anxiety   Recommended continuation of outpatient therapy at [facility]   Medical follow up with PCP as needed  Follow up in 4 weeks      Diagnoses:   Diagnoses and all orders for this visit:    Bipolar disorder in full remission, most recent episode unspecified type (HCC)  -     lamoTRIgine (LaMICtal) 150 MG tablet; Take 1 tablet (150 mg total) by mouth daily    Generalized " anxiety disorder  -     LORazepam (ATIVAN) 1 mg tablet; Take 1 tablet (1 mg total) by mouth daily as needed for anxiety          Treatment Recommendations:      Risks, Benefits And Possible Side Effects Of Medications:  Risks, benefits, and possible side effects of medications explained to patient and family, they verbalize understanding    Controlled Medication Discussion: The patient has been filling controlled prescriptions on time as prescribed to [place] Prescription Drug Monitoring program.      Treatment Plan: Next treatment plan due 10/22/2024.    Visit Time    Visit Start Time: 0858  Visit Stop Time: 0911  Total Visit Duration:  13 minutes      Natali Velazquez PA-C  05/14/24

## 2024-05-14 ENCOUNTER — OFFICE VISIT (OUTPATIENT)
Dept: PSYCHIATRY | Facility: CLINIC | Age: 54
End: 2024-05-14
Payer: COMMERCIAL

## 2024-05-14 DIAGNOSIS — F41.1 GENERALIZED ANXIETY DISORDER: ICD-10-CM

## 2024-05-14 DIAGNOSIS — F31.70 BIPOLAR DISORDER IN FULL REMISSION, MOST RECENT EPISODE UNSPECIFIED TYPE (HCC): Primary | ICD-10-CM

## 2024-05-14 PROCEDURE — 99214 OFFICE O/P EST MOD 30 MIN: CPT

## 2024-05-14 RX ORDER — LORAZEPAM 1 MG/1
1 TABLET ORAL DAILY PRN
Qty: 60 TABLET | Refills: 0 | Status: SHIPPED | OUTPATIENT
Start: 2024-05-14 | End: 2024-07-13

## 2024-05-14 RX ORDER — LAMOTRIGINE 150 MG/1
150 TABLET ORAL DAILY
Qty: 30 TABLET | Refills: 2 | Status: SHIPPED | OUTPATIENT
Start: 2024-05-14

## 2024-05-24 ENCOUNTER — SOCIAL WORK (OUTPATIENT)
Dept: BEHAVIORAL/MENTAL HEALTH CLINIC | Facility: CLINIC | Age: 54
End: 2024-05-24
Payer: COMMERCIAL

## 2024-05-24 DIAGNOSIS — F31.10 BIPOLAR I DISORDER, MOST RECENT EPISODE (OR CURRENT) MANIC (HCC): Primary | ICD-10-CM

## 2024-05-24 PROCEDURE — 90837 PSYTX W PT 60 MINUTES: CPT | Performed by: COUNSELOR

## 2024-05-24 NOTE — PSYCH
"Behavioral Health Psychotherapy Progress Note    Psychotherapy Provided: Individual Psychotherapy     1. Bipolar I disorder, most recent episode (or current) manic (HCC)            Goals addressed in session: Goal 1     DATA: Met with Robert who is in the process of being laid off from his job. This will be in affect by July. We processed this change along with him getting ahead of this with applying for positions now along with his anxiety which is exceeding the limits of his meds. We continues to explore him using grounding techniques and box breathing.  During this session, this clinician used the following therapeutic modalities: Client-centered Therapy    Substance Abuse was not addressed during this session. If the client is diagnosed with a co-occurring substance use disorder, please indicate any changes in the frequency or amount of use: Stage of change for addressing substance use diagnoses: No substance use/Not applicable    ASSESSMENT:  Francois Corrales presents with a Euthymic/ normal mood.     his affect is Normal range and intensity, which is congruent, with his mood and the content of the session. The client has made progress on their goals.    Francois Corrales presents with a none risk of suicide, none risk of self-harm, and none risk of harm to others.    For any risk assessment that surpasses a \"low\" rating, a safety plan must be developed.    A safety plan was indicated: no  If yes, describe in detail N/A    PLAN: Between sessions, Francois Corrales will process life changes and how he can apply coping skills. At the next session, the therapist will use Client-centered Therapy to address employment.    Behavioral Health Treatment Plan and Discharge Planning: Francois Corrales is aware of and agrees to continue to work on their treatment plan. They have identified and are working toward their discharge goals. yes    Visit start and stop times:    05/24/24  Start Time: 0754  Stop Time: 0847  Total Visit " Time: 53 minutes

## 2024-06-04 ENCOUNTER — SOCIAL WORK (OUTPATIENT)
Dept: BEHAVIORAL/MENTAL HEALTH CLINIC | Facility: CLINIC | Age: 54
End: 2024-06-04
Payer: COMMERCIAL

## 2024-06-04 DIAGNOSIS — F41.1 GENERALIZED ANXIETY DISORDER: Primary | ICD-10-CM

## 2024-06-04 DIAGNOSIS — F31.11 BIPOLAR AFFECTIVE DISORDER, CURRENTLY MANIC, MILD (HCC): ICD-10-CM

## 2024-06-04 PROCEDURE — 90837 PSYTX W PT 60 MINUTES: CPT | Performed by: COUNSELOR

## 2024-06-04 NOTE — PSYCH
"Behavioral Health Psychotherapy Progress Note    Psychotherapy Provided: Individual Psychotherapy     1. Generalized anxiety disorder        2. Bipolar affective disorder, currently manic, mild (HCC)            Goals addressed in session: Goal 1     DATA:  Met with Robert and processed him being prepared to be laid off in July. The business is giving him 2 months severance. We processed him going to [employer] to update his resume, get some skill building training and referrals  During this session, this clinician used the following therapeutic modalities: Client-centered Therapy    Substance Abuse was not addressed during this session. If the client is diagnosed with a co-occurring substance use disorder, please indicate any changes in the frequency or amount of use:  Stage of change for addressing substance use diagnoses: No substance use/Not applicable    ASSESSMENT:  Francois Corrales presents with a Euthymic/ normal mood.     his affect is Normal range and intensity, which is congruent, with his mood and the content of the session. The client has made progress on their goals.     Francois Corrales presents with a none risk of suicide, none risk of self-harm, and none risk of harm to others.    For any risk assessment that surpasses a \"low\" rating, a safety plan must be developed.    A safety plan was indicated: no  If yes, describe in detail N/A    PLAN: Between sessions, Francois Corrales will explore job search. At the next session, the therapist will use Client-centered Therapy to address employment opportunities.    Behavioral Health Treatment Plan and Discharge Planning: Francois Corrales is aware of and agrees to continue to work on their treatment plan. They have identified and are working toward their discharge goals. yes    Visit start and stop times:    06/04/24  Start Time: 0900  Stop Time: 0954  Total Visit Time: 54 minutes  "

## 2024-06-11 ENCOUNTER — TELEPHONE (OUTPATIENT)
Dept: PSYCHIATRY | Facility: CLINIC | Age: 54
End: 2024-06-11

## 2024-06-11 ENCOUNTER — DOCUMENTATION (OUTPATIENT)
Dept: BEHAVIORAL/MENTAL HEALTH CLINIC | Facility: CLINIC | Age: 54
End: 2024-06-11

## 2024-06-11 NOTE — PROGRESS NOTES
TRANSFER SUMMARY    [facility]    Patient Name Francois Corrales     Date of Birth: 53 y.o. 1970      MRN: [id number]    Admission Date:  4/16/2024    Date of Transfer: June 11, 2024    Admission Diagnosis:     Generalized Anxiety Disorder    Current Diagnosis:     No diagnosis found.    Reason for Admission: Francois presented for treatment due to anxiety. Primary complaints included ANXIETY SYMPTOMS: unremarkable.     Progress in Treatment: Francois was seen for Medication Management and Individual Couseling. During the course of treatment he attended sessions.    Episodes of Higher Level of Care: No    Transfer request Initiated by: Psychiatrist: None Therapist: Reynaldo Bosch, Ph.D., [facility]    Reason for Transfer Request: clinician leaving practice    Does this individual need a clinician with specialized training/expertise?: No    Is this client working with any other [facility] Providers/Therapists? Psychiatrist: None Therapist: None    Other pertinent issues: None    Are there any specific individuals who would be a “best fit” or who have already agreed to accept this transfer request?      Psychiatrist: None   Therapist: None  Rationale: Not Applicable    Attempts to maintain the current therapeutic relationship: No    Transfer request routed to Clinical Coordinator for input and/or approval.     Comments from other involved providers and/or clinical coordinator : None    Reynaldo Bosch, 06/11/24

## 2024-06-11 NOTE — PSYCH
"Psychiatric Medication Management - Behavioral Health   Francois Corrales 53 y.o. male MRN: [id number]    Reason for Visit:   Chief Complaint   Patient presents with    Medication Management       Subjective:  Medication compliance: yes  Medication side effects: hayde Parrish is a 52 y/o male being seen for medication management today. Patient has psychiatric diagnoses including bipolar disorder and generalized anxiety disorder. Patient is currently being observed on Wellbutrin  mg daily, propranolol 20 mg BID, Ambien CR 12.5 mg at bedtime, Lamictal 150 mg at bedtime, and Ativan 1 mg daily as needed for anxiety. Patient is currently connected to outpatient therapy at [facility]. No additional services in place at this time.    Patient presents today reporting \"german\" mood. States that he has been feeling this way since his last appointment. Explains that he is not really into doing things right now and he has low energy and motivation. Sleep has been poor this past week due to not being able to get his extended release Ambien. Reports that he was previously taking Ambien 10 mg dose and would be willing to have this sent to the [facility] pharmacy. States that he was sleeping but had frequent awakenings and was only getting about 6 hours each night. Appetite has been normal. He states that he is still experiencing some irritability but nothing too significant. Denies aggression, frequent crying spells, elevated moods, and mood swings. Patient rates his anxiety a 6-7/10 on a severity scale today, states that this is getting better as the day goes on. Depression is a 7/10 on a severity scale today. He states that therapy continues to go well and he is waiting for his transfer to a new therapist due to his therapist leaving the practice. Francois reports that he is interested in increasing to 200 mg of his Lamictal at this time due to being stable on this dose in the past. Will consider the option of titrating " his Wellbutrin dose if needed. Patient states that he is looking forward to spending time with his brother-in-law this weekend. He denies SI/HI. Denies access to guns or weapons. Denies AH/VH. Patient has no major questions or concerns at this time.    Review Of Systems:     Constitutional Negative   ENT Negative   Cardiovascular Negative   Respiratory Negative   Gastrointestinal Negative   Genitourinary Negative   Musculoskeletal Negative   Integumentary Negative   Neurological Negative   Endocrine Negative     Past Medical History:   Patient Active Problem List   Diagnosis    Bipolar disorder (HCC)    Borderline hyperlipidemia    Sleep apnea    Obesity (BMI 30.0-34.9)    Obstructive sleep apnea syndrome    Insomnia    Generalized anxiety disorder    Encounter for long-term (current) use of other medications    Bipolar I disorder, most recent episode (or current) manic (HCC)       Allergies: No Known Allergies    Past Surgical History:   Past Surgical History:   Procedure Laterality Date    CHOLECYSTECTOMY      COLONOSCOPY      HERNIA REPAIR         Past Psychiatric History:   Patient is a previous patient of Andrew Hsieh and Dr. Wilde     Medication trials: Citalopram 20mg- s/e lethergy;  Hydroxyzine 10mg -s/e sedation, Lexapro, Abilify (increased anxiety)   Hospitalizations: Never Hospitalized , denies h/o IOP or  PHP  ER visit for increased anxiety in March 2024   Violence- yes, punching walls and lashing out or breaking things if he's agitated; last occurred in July 2023- acute stressors, never voilence towards others   SIB/SA- denies    Family Psychiatric History:   Aunt - bipolar   Sister- anxiety   Dad- Alcoholism  Mom- Paranoia    Social History:   Patient lives with his wife and 15 y/o daughter   Occupation =    Hobbies = cars     Substance Abuse History:   Tobacco - never  Illicit - last used cannabis about 20 yrs ago   Alcohol - couple times per month 2-3 beers , he previously  "drink 4-5 . Denies h/o blackout seizure, DUI    The following portions of the patient's history were reviewed and updated as appropriate: allergies, current medications, past family history, past medical history, past social history, past surgical history, and problem list.    Objective:  There were no vitals filed for this visit.      Weight (last 2 days)       None          Labs: Labs 03/24/2024 WNL CBC diff, CMP, TSH, UA, negative UTOX, EKG NSR     Mental status:  Appearance restless and fidgety at times, dressed in casual clothing, adequate hygiene and grooming, fair eye contact   Mood \"Reji\"   Affect Appears mildly constricted, anxious at times, stable   Speech Normal rate and rhythm   Thought Processes Linear and goal directed   Associations intact associations   Hallucinations Denies any auditory or visual hallucinations   Thought Content No passive or active suicidal or homicidal ideation, intent, or plan.   Orientation Oriented to person, place, time, and situation   Recent and Remote Memory Grossly intact   Attention Span and Concentration Concentration intact, Inattentive at times   Intellect Not Formally Assessed   Insight Insight intact   Judgement judgment was intact   Muscle Strength Muscle strength and tone were normal   Language Within normal limits   Fund of Knowledge Age appropriate   Pain None       Assessment/Plan:     Impression:  Generalized anxiety disorder - improving    Bipolar disorder - variable      Increase to Lamictal 200 mg daily to continue to help with mood stabilization  Continue Lorazepam 1 mg daily as needed for increased anxiety   Change Ambien dosing to 10 mg at bedtime for insomnia concerns due to pharmacy not having other version of Ambien   Continue Wellbutrin  mg daily for depressive symptoms  Continue propranolol 20 mg BID as needed for anxiety   Recommended continuation of outpatient therapy at [facility]   Medical follow up with PCP as needed  Follow up " in 4 weeks      Diagnoses:   Diagnoses and all orders for this visit:    Bipolar disorder in full remission, most recent episode unspecified type (HCC)  -     lamoTRIgine (LaMICtal) 200 MG tablet; Take 1 tablet (200 mg total) by mouth daily    Generalized anxiety disorder  -     LORazepam (ATIVAN) 1 mg tablet; Take 1 tablet (1 mg total) by mouth daily as needed for anxiety    Insomnia, unspecified type  -     zolpidem (AMBIEN) 10 mg tablet; Take 1 tablet (10 mg total) by mouth daily at bedtime as needed for sleep          Treatment Recommendations:      Risks, Benefits And Possible Side Effects Of Medications:  Risks, benefits, and possible side effects of medications explained to patient and family, they verbalize understanding    Controlled Medication Discussion: The patient has been filling controlled prescriptions on time as prescribed to [place] Prescription Drug Monitoring program.      Treatment Plan: Next treatment plan due 10/22/2024.    Visit Time    Visit Start Time: 0900  Visit Stop Time: 0912  Total Visit Duration:  12 minutes      Natali Velazquez PA-C  06/14/24

## 2024-06-14 ENCOUNTER — OFFICE VISIT (OUTPATIENT)
Dept: PSYCHIATRY | Facility: CLINIC | Age: 54
End: 2024-06-14
Payer: COMMERCIAL

## 2024-06-14 DIAGNOSIS — F41.1 GENERALIZED ANXIETY DISORDER: ICD-10-CM

## 2024-06-14 DIAGNOSIS — F31.70 BIPOLAR DISORDER IN FULL REMISSION, MOST RECENT EPISODE UNSPECIFIED TYPE (HCC): Primary | ICD-10-CM

## 2024-06-14 DIAGNOSIS — G47.00 INSOMNIA, UNSPECIFIED TYPE: ICD-10-CM

## 2024-06-14 PROCEDURE — 99212 OFFICE O/P EST SF 10 MIN: CPT

## 2024-06-14 RX ORDER — LAMOTRIGINE 200 MG/1
200 TABLET ORAL DAILY
Qty: 90 TABLET | Refills: 1 | Status: SHIPPED | OUTPATIENT
Start: 2024-06-14

## 2024-06-14 RX ORDER — ZOLPIDEM TARTRATE 10 MG/1
10 TABLET ORAL
Qty: 30 TABLET | Refills: 1 | Status: SHIPPED | OUTPATIENT
Start: 2024-06-14

## 2024-06-14 RX ORDER — LORAZEPAM 1 MG/1
1 TABLET ORAL DAILY PRN
Qty: 30 TABLET | Refills: 0 | Status: SHIPPED | OUTPATIENT
Start: 2024-06-14 | End: 2024-08-13

## 2024-06-26 ENCOUNTER — TELEPHONE (OUTPATIENT)
Dept: PSYCHIATRY | Facility: CLINIC | Age: 54
End: 2024-06-26

## 2024-06-26 NOTE — PSYCH
"Psychiatric Medication Management - Behavioral Health   Francois Corrales 53 y.o. male MRN: [id number]    Reason for Visit:   Chief Complaint   Patient presents with    Medication Management       Subjective:  Medication compliance: yes  Medication side effects: hayde Parrish is a 54 y/o male being seen for medication management today. Patient has psychiatric diagnoses including bipolar disorder and generalized anxiety disorder. Patient is currently being observed on Wellbutrin  mg daily, propranolol 20 mg BID, Ambien 10 mg at bedtime, Lamictal 200 mg at bedtime, and Ativan 1 mg daily as needed for anxiety. Patient is currently connected to outpatient therapy at [facility]. No additional services in place at this time.    Patient presents for an emergency appointment today due to worsening depressive symptoms. He states that his mood today is \"not good\" and he would like to discuss possible med changes. Patient explains that he has a lot of situational stressors in his life right now including his job situation. He explains that he will be losing his job in August and he applied for another company but he did not get the job. He explains that his friends have been laid off and they are also struggling to find jobs so this is not comforting to him. He states that he has just been in a \"dumpy\" mood and he has been having negative thoughts about life. Patient has been struggling with low energy and passive suicidal thoughts. Patient is aware that a lot of his symptoms are situational but he is struggling at this time would like to increase his antidepressant. He states that sleep remains generally okay and the Ambien is working. Appetite remains normal. Energy and motivation are low. He states that he has therapy scheduled for tomorrow and he will be transitioning to a new therapist soon. Informed patient that a big part of his treatment will be therapy and he was understanding. Patient states that he " has been experiencing some irritability recently but denies any significant mood swings, elevated moods, crying spells, or aggression. Patient rates his depression 8/10 on severity scale today and he rates his anxiety a 5-6/10. He states that his anxiety is not overwhelming at this time but he still feels anxious at times. Patient states that he has been having daily passive SI but denies plan or intent. Patient is able to contract for safety. He denies HI. Denies access to guns or weapons. Denies AH/VH. Encouraged patient to call the office if he has any questions or concerns.    Review Of Systems:     Constitutional Negative   ENT Negative   Cardiovascular Negative   Respiratory Negative   Gastrointestinal Negative   Genitourinary Negative   Musculoskeletal Negative   Integumentary Negative   Neurological Negative   Endocrine Negative     Past Medical History:   Patient Active Problem List   Diagnosis    Bipolar disorder (HCC)    Borderline hyperlipidemia    Sleep apnea    Obesity (BMI 30.0-34.9)    Obstructive sleep apnea syndrome    Insomnia    Generalized anxiety disorder    Encounter for long-term (current) use of other medications    Bipolar I disorder, most recent episode (or current) manic (HCC)       Allergies: No Known Allergies    Past Surgical History:   Past Surgical History:   Procedure Laterality Date    CHOLECYSTECTOMY      COLONOSCOPY      HERNIA REPAIR         Past Psychiatric History:   Patient is a previous patient of Andrew Hsieh and Dr. Wilde     Medication trials: Citalopram 20mg- s/e lethergy;  Hydroxyzine 10mg -s/e sedation, Lexapro, Abilify (increased anxiety)   Hospitalizations: Never Hospitalized , denies h/o IOP or  PHP  ER visit for increased anxiety in March 2024   Violence- yes, punching walls and lashing out or breaking things if he's agitated; last occurred in July 2023- acute stressors, never voilence towards others   SIB/SA- denies    Family Psychiatric History:   Aunt -  "bipolar   Sister- anxiety   Dad- Alcoholism  Mom- Paranoia    Social History:   Patient lives with his wife and 15 y/o daughter   Occupation =    Hobbies = cars     Substance Abuse History:   Tobacco - never  Illicit - last used cannabis about 20 yrs ago   Alcohol - couple times per month 2-3 beers , he previously drink 4-5 . Denies h/o blackout seizure, DUI    The following portions of the patient's history were reviewed and updated as appropriate: allergies, current medications, past family history, past medical history, past social history, past surgical history, and problem list.    Objective:  There were no vitals filed for this visit.      Weight (last 2 days)       None          Labs: Labs 03/24/2024 WNL CBC diff, CMP, TSH, UA, negative UTOX, EKG NSR     Mental status:  Appearance restless and fidgety at times, dressed in casual clothing, adequate hygiene and grooming, fair eye contact   Mood \"Not good\"   Affect Appears mildly constricted in a depressed range, anxious at times, stable   Speech Normal rate and rhythm   Thought Processes Linear and goal directed   Associations intact associations   Hallucinations Denies any auditory or visual hallucinations   Thought Content No passive or active suicidal or homicidal ideation, intent, or plan.   Orientation Oriented to person, place, time, and situation   Recent and Remote Memory Grossly intact   Attention Span and Concentration Concentration intact, Inattentive at times   Intellect Not Formally Assessed   Insight Insight intact   Judgement judgment was intact   Muscle Strength Muscle strength and tone were normal   Language Within normal limits   Fund of Knowledge Age appropriate   Pain None       Assessment/Plan:     Impression:  Generalized anxiety disorder - improving    Bipolar disorder - variable      Increase to Wellbutrin  mg daily to help with depressive symptoms   Change dosing to Lamictal 100 mg BID to continue to " help with mood stabilization   Continue Lorazepam 1 mg daily as needed for increased anxiety   Continue Ambien dosing to 10 mg at bedtime for insomnia concerns   Continue propranolol 20 mg BID as needed for anxiety   Recommended continuation of outpatient therapy at [facility]   Reviewed patient's safety plan   Medical follow up with PCP as needed  Follow up in 2 weeks as scheduled      Diagnoses:   Diagnoses and all orders for this visit:    Bipolar disorder in full remission, most recent episode unspecified type (HCC)  -     lamoTRIgine (LaMICtal) 100 mg tablet; Take 1 tablet (100 mg total) by mouth 2 (two) times a day  -     buPROPion (Wellbutrin XL) 150 mg 24 hr tablet; Take 1 tablet (150 mg total) by mouth daily Take in addition to 300 mg tablet for total daily dose of 450 mg    Generalized anxiety disorder    Insomnia, unspecified type          Treatment Recommendations:      Risks, Benefits And Possible Side Effects Of Medications:  Risks, benefits, and possible side effects of medications explained to patient and family, they verbalize understanding    Controlled Medication Discussion: The patient has been filling controlled prescriptions on time as prescribed to [place] Prescription Drug Monitoring program.      Treatment Plan: Next treatment plan due 10/22/2024.    Visit Time    Visit Start Time: 1430  Visit Stop Time: 1444  Total Visit Duration:  14 minutes      Natali Velazquez PA-C  06/27/24

## 2024-06-26 NOTE — TELEPHONE ENCOUNTER
Spoke with pt, gave message. He's now scheduled to see you tomorrow afternoon    We did not cancel his July appt just in case you wanted it kept

## 2024-06-26 NOTE — TELEPHONE ENCOUNTER
Pt called, he's asking to speak with you about increasing one or two of his depression medications    Do you want me to schedule him an appt or is this something you can call him about?

## 2024-06-27 ENCOUNTER — OFFICE VISIT (OUTPATIENT)
Dept: PSYCHIATRY | Facility: CLINIC | Age: 54
End: 2024-06-27
Payer: COMMERCIAL

## 2024-06-27 DIAGNOSIS — F41.1 GENERALIZED ANXIETY DISORDER: ICD-10-CM

## 2024-06-27 DIAGNOSIS — F31.70 BIPOLAR DISORDER IN FULL REMISSION, MOST RECENT EPISODE UNSPECIFIED TYPE (HCC): Primary | ICD-10-CM

## 2024-06-27 DIAGNOSIS — G47.00 INSOMNIA, UNSPECIFIED TYPE: ICD-10-CM

## 2024-06-27 PROCEDURE — 99212 OFFICE O/P EST SF 10 MIN: CPT

## 2024-06-27 RX ORDER — LAMOTRIGINE 100 MG/1
100 TABLET ORAL 2 TIMES DAILY
Qty: 60 TABLET | Refills: 2 | Status: SHIPPED | OUTPATIENT
Start: 2024-06-27

## 2024-06-27 RX ORDER — BUPROPION HYDROCHLORIDE 150 MG/1
150 TABLET ORAL DAILY
Qty: 30 TABLET | Refills: 1 | Status: SHIPPED | OUTPATIENT
Start: 2024-06-27

## 2024-06-28 ENCOUNTER — SOCIAL WORK (OUTPATIENT)
Dept: BEHAVIORAL/MENTAL HEALTH CLINIC | Facility: CLINIC | Age: 54
End: 2024-06-28
Payer: COMMERCIAL

## 2024-06-28 DIAGNOSIS — F41.1 GENERALIZED ANXIETY DISORDER: ICD-10-CM

## 2024-06-28 DIAGNOSIS — F31.0 BIPOLAR AFFECTIVE DISORDER, CURRENT EPISODE HYPOMANIC (HCC): Primary | ICD-10-CM

## 2024-06-28 PROCEDURE — 90834 PSYTX W PT 45 MINUTES: CPT | Performed by: COUNSELOR

## 2024-06-28 NOTE — PSYCH
"Behavioral Health Psychotherapy Progress Note    Psychotherapy Provided: Individual Psychotherapy     1. Bipolar affective disorder, current episode hypomanic (HCC)        2. Generalized anxiety disorder            Goals addressed in session: Goal 1     DATA: Met with Robert who is projecting on when he losing his job in August due to lay offs. He was recently rejected from a job which sent him into a slight depression. However he also saw his PA, Natali Rich who increased his Wellbutrin to see if that can lift his mood. In the meantime he will continue to see this provider until he transfers to [facility].  During this session, this clinician used the following therapeutic modalities: Cognitive Behavioral Therapy    Substance Abuse was not addressed during this session. If the client is diagnosed with a co-occurring substance use disorder, please indicate any changes in the frequency or amount of use: Stage of change for addressing substance use diagnoses: No substance use/Not applicable    ASSESSMENT:  Francois Corrales presents with a Euthymic/ normal mood.     his affect is Normal range and intensity, which is congruent, with his mood and the content of the session. The client has made progress on their goals.     Francois Corrales presents with a none risk of suicide, none risk of self-harm, and none risk of harm to others.    For any risk assessment that surpasses a \"low\" rating, a safety plan must be developed.    A safety plan was indicated: no  If yes, describe in detail N/A    PLAN: Between sessions, Francois Corrales will continue to job search using [employer]. At the next session, the therapist will use Client-centered Therapy to address employment.    Behavioral Health Treatment Plan and Discharge Planning: Francois Corrales is aware of and agrees to continue to work on their treatment plan. They have identified and are working toward their discharge goals. yes    Visit start and stop times:    06/28/24  Start " Time: 1000  Stop Time: 1045  Total Visit Time: 45 minutes

## 2024-07-09 ENCOUNTER — TELEPHONE (OUTPATIENT)
Dept: PSYCHIATRY | Facility: CLINIC | Age: 54
End: 2024-07-09

## 2024-07-09 ENCOUNTER — TELEPHONE (OUTPATIENT)
Age: 54
End: 2024-07-09

## 2024-07-09 NOTE — TELEPHONE ENCOUNTER
Francois Corrales called the office and left a voicemail and  requested a call back to discuss rescheduling his appt on 8/12    They can be reached at P# [phone].       Thank you.

## 2024-07-09 NOTE — TELEPHONE ENCOUNTER
Pt calls in and states that he has an upcoming appt with  and was wondering if he should bring in his CPAP or the SD card for it. Please advise

## 2024-07-10 NOTE — PSYCH
Psychiatric Medication Management - Behavioral Health   Francois Corrales 53 y.o. male MRN: [id number]    Reason for Visit:   Chief Complaint   Patient presents with    Medication Management       Subjective:  Medication compliance: yes  Medication side effects: hayde Parrish is a 54 y/o male being seen for medication management today. Patient has psychiatric diagnoses including bipolar disorder and generalized anxiety disorder. Patient is currently being observed on Wellbutrin  mg daily, propranolol 20 mg BID, Ambien 10 mg at bedtime, Lamictal 100 mg BID, and Ativan 1 mg daily as needed for anxiety. Patient is currently connected to outpatient therapy at [facility]. No additional services in place at this time.    Patient presents today reporting that he has been feeling better since his medication changes. He states that he is no longer having suicidal ideations and he is having less issues with rumination. He states that he feels like he has a little bit of an increase in agitation and anxiety but this is not too bothersome at the time. Reports that he is unsure if the energy and motivation levels have improved or not yet. Sleep remains okay with the use of Ambien. Appetite remains good, eating 2-3 meals a day. Patient denies any significant mood swings, elevated moods, frequent crying spells, or aggression. He reports some irritability but states that this is manageable. Regarding patient's job situation, he reports that he has job interviews set up for Monday and he will be preparing for these over the weekend. Patient rates his depression a 5/10 on severity scale today, this was previously an 8/10. Patient rates his anxiety as 6/10 on a severity scale today, this was previously a 5-6/10. Patient states that his anxiety continues to come in waves and is more so in the morning but it is manageable. Patient denies SI at this time. Denies HI. Denies access to guns or weapons. Denies AH/VH.  Encouraged patient to call the office if he has any questions or concerns. Patient reports that he is feeling stable at this time and does not need any medication changes.    Review Of Systems:     Constitutional Negative   ENT Negative   Cardiovascular Negative   Respiratory Negative   Gastrointestinal Negative   Genitourinary Negative   Musculoskeletal Negative   Integumentary Negative   Neurological Negative   Endocrine Negative     Past Medical History:   Patient Active Problem List   Diagnosis    Bipolar disorder (HCC)    Borderline hyperlipidemia    Sleep apnea    Obesity (BMI 30.0-34.9)    Obstructive sleep apnea syndrome    Insomnia    Generalized anxiety disorder    Encounter for long-term (current) use of other medications    Bipolar I disorder, most recent episode (or current) manic (HCC)       Allergies: No Known Allergies    Past Surgical History:   Past Surgical History:   Procedure Laterality Date    CHOLECYSTECTOMY      COLONOSCOPY      HERNIA REPAIR         Past Psychiatric History:   Patient is a previous patient of Andrew Hsieh and Dr. Wilde     Medication trials: Citalopram 20mg- s/e lethergy;  Hydroxyzine 10mg -s/e sedation, Lexapro, Abilify (increased anxiety)   Hospitalizations: Never Hospitalized , denies h/o IOP or  PHP  ER visit for increased anxiety in March 2024   Violence- yes, punching walls and lashing out or breaking things if he's agitated; last occurred in July 2023- acute stressors, never voilence towards others   SIB/SA- denies    Family Psychiatric History:   Aunt - bipolar   Sister- anxiety   Dad- Alcoholism  Mom- Paranoia    Social History:   Patient lives with his wife and 15 y/o daughter   Occupation =    Hobbies = cars     Substance Abuse History:   Tobacco - never  Illicit - last used cannabis about 20 yrs ago   Alcohol - couple times per month 2-3 beers , he previously drink 4-5 . Denies h/o blackout seizure, DUI    The following portions of the  "patient's history were reviewed and updated as appropriate: allergies, current medications, past family history, past medical history, past social history, past surgical history, and problem list.    Objective:  There were no vitals filed for this visit.      Weight (last 2 days)       None          Labs: Labs 03/24/2024 WNL CBC diff, CMP, TSH, UA, negative UTOX, EKG NSR     Mental status:  Appearance fidgety at times, dressed in casual clothing, adequate hygiene and grooming, fair eye contact   Mood \"A little better\"   Affect Appears mildly constricted in a depressed range, anxious at times, smiles appropriately, stable   Speech Normal rate and rhythm   Thought Processes Linear and goal directed   Associations intact associations   Hallucinations Denies any auditory or visual hallucinations   Thought Content No passive or active suicidal or homicidal ideation, intent, or plan.   Orientation Oriented to person, place, time, and situation   Recent and Remote Memory Grossly intact   Attention Span and Concentration Concentration intact, Inattentive at times   Intellect Not Formally Assessed   Insight Insight intact   Judgement judgment was intact   Muscle Strength Muscle strength and tone were normal   Language Within normal limits   Fund of Knowledge Age appropriate   Pain None       Assessment/Plan:     Impression:  Generalized anxiety disorder - variable     Bipolar disorder - variable      Continue Wellbutrin  mg daily to help with depressive symptoms  Continue Lamictal 100 mg BID to continue to help with mood stabilization   Continue Lorazepam 1 mg daily as needed for increased anxiety   Continue Ambien dosing to 10 mg at bedtime for insomnia concerns   Continue propranolol 20 mg BID as needed for anxiety   Recommended continuation of outpatient therapy at [facility]   Reviewed patient's safety plan   Medical follow up with PCP as needed  Follow up in 4 weeks      Diagnoses:   Diagnoses and all " orders for this visit:    Generalized anxiety disorder  -     propranolol (INDERAL) 20 mg tablet; Take 1 tablet (20 mg total) by mouth every 12 (twelve) hours Hold if BP < 100/<60  -     LORazepam (ATIVAN) 1 mg tablet; Take 1 tablet (1 mg total) by mouth daily as needed for anxiety    Bipolar disorder in full remission, most recent episode unspecified type (HCC)    Bipolar I disorder, most recent episode (or current) manic (HCC)  -     propranolol (INDERAL) 20 mg tablet; Take 1 tablet (20 mg total) by mouth every 12 (twelve) hours Hold if BP < 100/<60          Treatment Recommendations:      Risks, Benefits And Possible Side Effects Of Medications:  Risks, benefits, and possible side effects of medications explained to patient and family, they verbalize understanding    Controlled Medication Discussion: The patient has been filling controlled prescriptions on time as prescribed to [place] Prescription Drug Monitoring program.      Treatment Plan: Next treatment plan due 10/22/2024.    Visit Time    Visit Start Time: 0827  Visit Stop Time: 0839  Total Visit Duration:  12 minutes      Natali Velazquez PA-C  07/12/24

## 2024-07-12 ENCOUNTER — OFFICE VISIT (OUTPATIENT)
Dept: PSYCHIATRY | Facility: CLINIC | Age: 54
End: 2024-07-12
Payer: COMMERCIAL

## 2024-07-12 DIAGNOSIS — F31.70 BIPOLAR DISORDER IN FULL REMISSION, MOST RECENT EPISODE UNSPECIFIED TYPE (HCC): ICD-10-CM

## 2024-07-12 DIAGNOSIS — F41.1 GENERALIZED ANXIETY DISORDER: Primary | ICD-10-CM

## 2024-07-12 DIAGNOSIS — F31.10 BIPOLAR I DISORDER, MOST RECENT EPISODE (OR CURRENT) MANIC (HCC): ICD-10-CM

## 2024-07-12 PROCEDURE — 99212 OFFICE O/P EST SF 10 MIN: CPT

## 2024-07-12 RX ORDER — LORAZEPAM 1 MG/1
1 TABLET ORAL DAILY PRN
Qty: 30 TABLET | Refills: 0 | Status: SHIPPED | OUTPATIENT
Start: 2024-07-12 | End: 2024-09-10

## 2024-07-12 RX ORDER — PROPRANOLOL HYDROCHLORIDE 20 MG/1
20 TABLET ORAL EVERY 12 HOURS SCHEDULED
Qty: 60 TABLET | Refills: 2 | Status: SHIPPED | OUTPATIENT
Start: 2024-07-12

## 2024-07-16 ENCOUNTER — TELEPHONE (OUTPATIENT)
Dept: PSYCHIATRY | Facility: CLINIC | Age: 54
End: 2024-07-16

## 2024-07-16 NOTE — TELEPHONE ENCOUNTER
LVM for patient to call office and confirm phone number. Please confirm phone number upon returned phone call

## 2024-07-17 ENCOUNTER — TELEMEDICINE (OUTPATIENT)
Dept: BEHAVIORAL/MENTAL HEALTH CLINIC | Facility: CLINIC | Age: 54
End: 2024-07-17
Payer: COMMERCIAL

## 2024-07-17 DIAGNOSIS — F31.76 BIPOLAR I DISORDER, MOST RECENT EPISODE DEPRESSED, IN FULL REMISSION (HCC): ICD-10-CM

## 2024-07-17 DIAGNOSIS — F31.10 BIPOLAR I DISORDER, MOST RECENT EPISODE (OR CURRENT) MANIC (HCC): Primary | ICD-10-CM

## 2024-07-17 PROCEDURE — 90832 PSYTX W PT 30 MINUTES: CPT | Performed by: COUNSELOR

## 2024-07-17 NOTE — PSYCH
"Behavioral Health Psychotherapy Progress Note    Psychotherapy Provided: Individual Psychotherapy     No diagnosis found.    Goals addressed in session: Goal 1     DATA: Met with Robert and reviewed his status. He reports feeling good with having several interviews for jobs which are looking promising. He reports being stable on his meds and that his wife continues to be supportive while he is at home applying for jobs. He reported that he did not need to see me again until he transfers to [facility] so his last appointment with this clinician was cancelled.   During this session, this clinician used the following therapeutic modalities: Client-centered Therapy    Substance Abuse was not addressed during this session. If the client is diagnosed with a co-occurring substance use disorder, please indicate any changes in the frequency or amount of use: Stage of change for addressing substance use diagnoses: No substance use/Not applicable    ASSESSMENT:  Francois Corrales presents with a Euthymic/ normal mood.     his affect is Normal range and intensity, which is congruent, with his mood and the content of the session. The client has made progress on their goals.     Francois Corrales presents with a none risk of suicide, none risk of self-harm, and none risk of harm to others.    For any risk assessment that surpasses a \"low\" rating, a safety plan must be developed.    A safety plan was indicated: no  If yes, describe in detail N/A    PLAN: Between sessions, Francois Corrales will continue to attend therapy  . At the next session, the therapist will use Client-centered Therapy to address transfer.    Behavioral Health Treatment Plan and Discharge Planning: Francois Corrales is aware of and agrees to continue to work on their treatment plan. They have identified and are working toward their discharge goals. yes    Visit start and stop times:    07/17/24  Start Time: 1000  Stop Time: 1030  Total Visit Time: 30 minutes  "

## 2024-07-19 ENCOUNTER — OFFICE VISIT (OUTPATIENT)
Age: 54
End: 2024-07-19
Payer: COMMERCIAL

## 2024-07-19 VITALS
WEIGHT: 249 LBS | DIASTOLIC BLOOD PRESSURE: 62 MMHG | OXYGEN SATURATION: 98 % | HEART RATE: 65 BPM | SYSTOLIC BLOOD PRESSURE: 118 MMHG | HEIGHT: 70 IN | BODY MASS INDEX: 35.65 KG/M2 | TEMPERATURE: 97.4 F

## 2024-07-19 DIAGNOSIS — E66.9 OBESITY (BMI 30.0-34.9): ICD-10-CM

## 2024-07-19 DIAGNOSIS — F31.10 BIPOLAR I DISORDER, MOST RECENT EPISODE (OR CURRENT) MANIC (HCC): ICD-10-CM

## 2024-07-19 DIAGNOSIS — G47.33 OBSTRUCTIVE SLEEP APNEA SYNDROME: Primary | ICD-10-CM

## 2024-07-19 LAB

## 2024-07-19 PROCEDURE — 99213 OFFICE O/P EST LOW 20 MIN: CPT | Performed by: INTERNAL MEDICINE

## 2024-07-19 NOTE — PROGRESS NOTES
Pulmonary/Sleep Follow Up Note   Francois Corrales 53 y.o. male MRN: [id number]  7/19/2024      Assessment and Plan:    1. Obstructive sleep apnea syndrome  Moderate ISAAK 2014   Has been using CPAP 4-20 regularly  Has an old CPAP machine from [employer], replaced through the recall process  His new machine is set at 4-20 auto titrating  I ordered new supplies for him today  He has mild persistent sleepiness during the day likely secondary to his medications he is on Lamictal, Ativan as well as Ambien CR at night 12.5 mg which could have residual effects during the day  - PAP DME Resupply/Reorder    2. Obesity (BMI 30.0-34.9)  BMI 35.73  He would benefit from weight loss    3. Bipolar I disorder, most recent episode (or current) manic (HCC)  On Lamictal which could be impacting his daytime energy and sleepiness levels         Return in about 1 year (around 7/19/2025).    History of Present Illness   HPI:  Francois Corrales is a 53 y.o. male who is here for a f/u appointment, last seen in 10/2023. He has hx of ISAAK diagnosed in 2014.   His current sleep routine is going to bed at 10 PM.  In half an hour wakes up at 7 AM he has 2-3 awakenings overnight.  Sometimes he feels sleepy during the days in the afternoon he will take 1 hour nap.  Of note, he is on many medications for other psychiatric disorders as detailed above.  He reports history of snoring however that is not the case while he is using his CPAP.  Denies drinking coffee or caffeinated energy drinks.  Denies tobacco alcohol or drug abuse.  He is [place] scale is as detailed below.         Historical Information   Past Medical History:   Diagnosis Date    Bipolar 1 disorder (HCC)     Moderate cannabis use disorder (HCC) 12/05/2022    Sleep apnea     pt uses CPAP    Sleep apnea, obstructive      Past Surgical History:   Procedure Laterality Date    CHOLECYSTECTOMY      COLONOSCOPY      HERNIA REPAIR       Family History   Problem Relation Age of Onset    Diabetes  "Mother     Cancer Mother         Uterine cancer    Cancer Father         Lung    COPD Father     Coronary artery disease Father          Meds/Allergies     Current Outpatient Medications:     buPROPion (Wellbutrin XL) 150 mg 24 hr tablet, Take 1 tablet (150 mg total) by mouth daily Take in addition to 300 mg tablet for total daily dose of 450 mg, Disp: 30 tablet, Rfl: 1    buPROPion (WELLBUTRIN XL) 300 mg 24 hr tablet, Bupropion ER 300m tablet po daily in the morning, Disp: 90 tablet, Rfl: 1    lamoTRIgine (LaMICtal) 100 mg tablet, Take 1 tablet (100 mg total) by mouth 2 (two) times a day, Disp: 60 tablet, Rfl: 2    LORazepam (ATIVAN) 1 mg tablet, Take 1 tablet (1 mg total) by mouth daily as needed for anxiety, Disp: 30 tablet, Rfl: 0    propranolol (INDERAL) 20 mg tablet, Take 1 tablet (20 mg total) by mouth every 12 (twelve) hours Hold if BP < 100/<60, Disp: 60 tablet, Rfl: 2    zolpidem (AMBIEN) 10 mg tablet, Take 1 tablet (10 mg total) by mouth daily at bedtime as needed for sleep, Disp: 30 tablet, Rfl: 1  No Known Allergies    Vitals: Blood pressure 118/62, pulse 65, temperature (!) 97.4 °F (36.3 °C), temperature source Tympanic, height 5' 10\" (1.778 m), weight 113 kg (249 lb), SpO2 98%. Body mass index is 35.73 kg/m². Oxygen Therapy  SpO2: 98 %  Oxygen Therapy: None (Room air)      Physical Exam  General:  Awake alert and oriented x 3, conversant without conversational dyspnea, NAD, normal affect  HEENT:   Sclera noninjected, nonicteric OU, Nares patent,  no craniofacial abnormalities  NECK:  Trachea midline, no accessory muscle use, no stridor,  JVP not elevated  CARDIAC: Reg, single s1/S2, no m/r/g  PULM: CTA bilaterally no wheezing, rhonchi or rales  EXT: No cyanosis, no clubbing, no edema, normal capillary refill  NEURO: no focal neurologic deficits, AAOx3, moving all extremities appropriately    Labs: I have personally reviewed pertinent lab results., ABG: No results found for: \"PHART\", \"PLV4MDI\", " "\"PO2ART\", \"JKC6LJZ\", \"O0NARVWV\", \"BEART\", \"SOURCE\", BNP: No results found for: \"BNP\", CBC: No results found for: \"WBC\", \"HGB\", \"HCT\", \"MCV\", \"PLT\", \"ADJUSTEDWBC\", \"RBC\", \"MCH\", \"MCHC\", \"RDW\", \"MPV\", \"NRBC\", CMP: No results found for: \"SODIUM\", \"K\", \"CL\", \"CO2\", \"ANIONGAP\", \"BUN\", \"CREATININE\", \"GLUCOSE\", \"CALCIUM\", \"AST\", \"ALT\", \"ALKPHOS\", \"PROT\", \"BILITOT\", \"EGFR\", PT/INR: No results found for: \"PT\", \"INR\", Troponin: No results found for: \"TROPONINI\"  Lab Results   Component Value Date    WBC 12.77 (H) 03/24/2024    HGB 18.0 (H) 03/24/2024    HCT 53.9 (H) 03/24/2024    MCV 86 03/24/2024     03/24/2024     Lab Results   Component Value Date    CALCIUM 9.4 03/24/2024    K 3.9 03/24/2024    CO2 28 03/24/2024     03/24/2024    BUN 18 03/24/2024    CREATININE 1.07 03/24/2024     No results found for: \"IGE\"  Lab Results   Component Value Date    ALT 33 03/24/2024    AST 24 03/24/2024    ALKPHOS 77 03/24/2024         Imaging and other studies: I have personally reviewed pertinent reports.            Sleep studies: I have personally reviewed pertinent reports.    Diagnostic: AHI 17 events/hr --> moderate ISAAK             Steve Lockwood MD  [facility]       Portions of the record may have been created with voice recognition software. Occasional wrong word or \"sound a like\" substitutions may have occurred due to the inherent limitations of voice recognition software. Read the chart carefully and recognize, using context, where substitutions have occurred.  "

## 2024-07-19 NOTE — PATIENT INSTRUCTIONS
Patient Education     Continuous Positive Airway Pressure   Why is this procedure done?   Continuous positive airway pressure is often called CPAP. It is a treatment used to help you breathe better. It keeps the airways open by using air pressure. It is the first choice of treatment for obstructive sleep apnea. This happens when your breathing stops or gets shallow while you are asleep. You are not able to get enough air from your mouth and nose into your lungs. Breathing pauses from a few seconds to minutes in sleep apnea. CPAP can also be used to treat other health problems like breathing problems in babies.  CPAP is given by a machine that helps breathing. It gives constant air pressure to keep the airways open. The machine most often has:  A mask that covers your mouth and nose or just your nose  A tube that hooks the mask to the machine  A pump or motor that gives off the air pressure that is attached to the tubing  What will the results be?   Treat sleep apnea  Lessen your daytime sleepiness  Lessen chance for high blood pressure  Improve heart function  What happens before the procedure?   Your doctor will take your history. Talk to the doctor about:   All the drugs you are taking. Be sure to include all prescription and over-the-counter (OTC) drugs, and herbal supplements. Tell the doctor about any drug allergy. Bring a list of drugs you take with you.  Your doctor will do an exam and may:  Check your lungs, ears, nose, and throat.   Order a sleep study. This will help your doctor know the right amount of airway pressure.  Send you to see a lung or ear, nose, and throat specialist.  What happens during the procedure?   Your doctor will fit a mask tightly on your face. This will make sure that air does not leak out.  The mask will cover your mouth and nose or just your nose. The pump will force air through your airway to keep it open. You will feel pressure from the mask.  You will wear the face mask when you  sleep during the study.  If you have CPAP treatments for obstructive sleep apnea, you will use the machine for the rest of your life.  What happens after the procedure?   After your sleep study, you will be able to go home.  If you have obstructive sleep apnea, you will need to have a CPAP machine at home. CPAP machines are costly. You can rent a machine before you buy it. Your insurance may also help to pay for it.  What care is needed at home?   Be sure to use the CPAP machine every night. If you stop using it, it is likely that your signs will return.  Ask your doctor or machine provider how to care for and clean the machine and face mask.  What follow-up care is needed?   Your doctor may ask you to make visits to the office to check on your progress. Be sure to keep these visits.  What lifestyle changes are needed?   Eat a healthy diet.  Lose weight  Exercise regularly.  Keep your home smoke free. Stay away from secondhand and thirdhand smoke. Anyone in your home who smokes should quit smoking.  Do not drink beer, wine, and mixed drinks (alcohol).  What problems could happen?   Skin allergy or skin irritation from the mask  Dry mouth and nose  Air may leak from mask  Problems from air pressure  Nightmares and dreaming a lot during early use  Last Reviewed Date   2020-11-02  Consumer Information Use and Disclaimer   This generalized information is a limited summary of diagnosis, treatment, and/or medication information. It is not meant to be comprehensive and should be used as a tool to help the user understand and/or assess potential diagnostic and treatment options. It does NOT include all information about conditions, treatments, medications, side effects, or risks that may apply to a specific patient. It is not intended to be medical advice or a substitute for the medical advice, diagnosis, or treatment of a health care provider based on the health care provider's examination and assessment of a patient’s  specific and unique circumstances. Patients must speak with a health care provider for complete information about their health, medical questions, and treatment options, including any risks or benefits regarding use of medications. This information does not endorse any treatments or medications as safe, effective, or approved for treating a specific patient. UpToDate, Inc. and its affiliates disclaim any warranty or liability relating to this information or the use thereof. The use of this information is governed by the Terms of Use, available at https://www.woltersRallyhooduwer.com/en/know/clinical-effectiveness-terms   Copyright   Copyright © 2024 UpToDate, Inc. and its affiliates and/or licensors. All rights reserved.

## 2024-07-22 LAB

## 2024-08-07 ENCOUNTER — OFFICE VISIT (OUTPATIENT)
Dept: BEHAVIORAL/MENTAL HEALTH CLINIC | Facility: CLINIC | Age: 54
End: 2024-08-07
Payer: COMMERCIAL

## 2024-08-07 DIAGNOSIS — F31.10 BIPOLAR I DISORDER, MOST RECENT EPISODE (OR CURRENT) MANIC (HCC): Primary | ICD-10-CM

## 2024-08-07 DIAGNOSIS — F41.1 GENERALIZED ANXIETY DISORDER: ICD-10-CM

## 2024-08-07 PROCEDURE — 90834 PSYTX W PT 45 MINUTES: CPT | Performed by: COUNSELOR

## 2024-08-07 NOTE — PSYCH
"Psychiatric Medication Management - Behavioral Health   Francois Corrales 53 y.o. male MRN: [id number]    Reason for Visit:   Chief Complaint   Patient presents with    Medication Management       Subjective:  Medication compliance: yes  Medication side effects: hayde Parrish is a 54 y/o male being seen for medication management today. Patient has psychiatric diagnoses including bipolar disorder and generalized anxiety disorder. Patient is currently being observed on Wellbutrin  mg daily, propranolol 20 mg BID, Ambien 10 mg at bedtime, Lamictal 100 mg BID, and Ativan 1 mg daily as needed for anxiety. Patient is currently connected to outpatient therapy at [facility] [place]. No additional services in place at this time.    Patient presents today reporting \"I have been better recently\". He states that he has some interviews coming up for jobs so he is trying to stay positive about this. Reports that it is his last day at his job today which is a weird feeling for him but he is possessing forward thinking. States that energy and motivation levels have still been a little low although he is motivated to find a job. Sleep has been okay recently, sometimes waking up once during the night but able to fall back asleep. Appetite has been normal, eating 2-3 meals a day. Patient denies any significant mood swings, aggression, crying spells, irritability, or elevated moods. He states that his mood has felt pretty steady recently and his anxiety and depression have been manageable. Patient states that therapy is going well and he had 3 days of EMDR therapy with a therapist in [place] last week. He states that this seemed to be helpful as well. Patient reports that his depression and anxiety seem to be situational and they are not too bothersome at this time. Patient rates his depression a 3-4/10, this was previously a 5/10. Patient rates his anxiety a 5/10, this was previously a 6/10. Patient states that he has no " concerns at this time and he feels comfortable on his medications. Plan is to follow-up in about 4 weeks. Patient denies SI/HI. Denies access to guns or weapons. Denies AH/VH.    Review Of Systems:     Constitutional Negative   ENT Negative   Cardiovascular Negative   Respiratory Negative   Gastrointestinal Negative   Genitourinary Negative   Musculoskeletal Negative   Integumentary Negative   Neurological Negative   Endocrine Negative     Past Medical History:   Patient Active Problem List   Diagnosis    Bipolar disorder (HCC)    Borderline hyperlipidemia    Sleep apnea    Obesity (BMI 30.0-34.9)    Obstructive sleep apnea syndrome    Insomnia    Generalized anxiety disorder    Encounter for long-term (current) use of other medications    Bipolar I disorder, most recent episode (or current) manic (HCC)       Allergies: No Known Allergies    Past Surgical History:   Past Surgical History:   Procedure Laterality Date    CHOLECYSTECTOMY      COLONOSCOPY      HERNIA REPAIR         Past Psychiatric History:   Patient is a previous patient of Andrew Hsieh and Dr. Wilde     Medication trials: Citalopram 20mg- s/e lethergy;  Hydroxyzine 10mg -s/e sedation, Lexapro, Abilify (increased anxiety)   Hospitalizations: Never Hospitalized , denies h/o IOP or  PHP  ER visit for increased anxiety in March 2024   Violence- yes, punching walls and lashing out or breaking things if he's agitated; last occurred in July 2023- acute stressors, never voilence towards others   SIB/SA- denies    Family Psychiatric History:   Aunt - bipolar   Sister- anxiety   Dad- Alcoholism  Mom- Paranoia    Social History:   Patient lives with his wife and 17 y/o daughter   Occupation =    Hobbies = cars     Substance Abuse History:   Tobacco - never  Illicit - last used cannabis about 20 yrs ago   Alcohol - couple times per month 2-3 beers , he previously drink 4-5 . Denies h/o blackout seizure, DUI    The following portions of  "the patient's history were reviewed and updated as appropriate: allergies, current medications, past family history, past medical history, past social history, past surgical history, and problem list.    Objective:  There were no vitals filed for this visit.      Weight (last 2 days)       None          Labs: Labs 03/24/2024 WNL CBC diff, CMP, TSH, UA, negative UTOX, EKG NSR     Mental status:  Appearance Sitting comfortably in chair, dressed in casual clothing, adequate hygiene and grooming, fair eye contact   Mood \"Better\"   Affect Appears brighter on approach, constricted and anxious at times, smiles appropriately, stable   Speech Normal rate and rhythm   Thought Processes Linear and goal directed   Associations intact associations   Hallucinations Denies any auditory or visual hallucinations   Thought Content No passive or active suicidal or homicidal ideation, intent, or plan.   Orientation Oriented to person, place, time, and situation   Recent and Remote Memory Grossly intact   Attention Span and Concentration Concentration intact, Inattentive at times   Intellect Not Formally Assessed   Insight Insight intact   Judgement judgment was intact   Muscle Strength Muscle strength and tone were normal   Language Within normal limits   Fund of Knowledge Age appropriate   Pain None       Assessment/Plan:     Impression:  Generalized anxiety disorder - variable     Bipolar disorder - variable      Continue Wellbutrin  mg daily to help with depressive symptoms  Continue Lamictal 100 mg BID to continue to help with mood stabilization   Continue Lorazepam 1 mg daily as needed for increased anxiety   Continue Ambien dosing to 10 mg at bedtime for insomnia concerns   Continue propranolol 20 mg BID as needed for anxiety   Recommended continuation of outpatient therapy at [facility] [place]   Reviewed patient's safety plan   Medical follow up with PCP as needed  Follow up in 4 weeks      Diagnoses:   Diagnoses and " all orders for this visit:    Bipolar disorder in full remission, most recent episode unspecified type (HCC)  -     buPROPion (Wellbutrin XL) 150 mg 24 hr tablet; Take 1 tablet (150 mg total) by mouth daily Take in addition to 300 mg tablet for total daily dose of 450 mg    Generalized anxiety disorder  -     LORazepam (ATIVAN) 1 mg tablet; Take 1 tablet (1 mg total) by mouth daily as needed for anxiety    Insomnia, unspecified type  -     zolpidem (AMBIEN) 10 mg tablet; Take 1 tablet (10 mg total) by mouth daily at bedtime as needed for sleep          Treatment Recommendations:      Risks, Benefits And Possible Side Effects Of Medications:  Risks, benefits, and possible side effects of medications explained to patient and family, they verbalize understanding    Controlled Medication Discussion: The patient has been filling controlled prescriptions on time as prescribed to [place] Prescription Drug Monitoring program.      Treatment Plan: Next treatment plan due 10/22/2024.    Visit Time    Visit Start Time: 0928  Visit Stop Time: 0939  Total Visit Duration:  11 minutes      Natali Velazquez PA-C  08/09/24

## 2024-08-07 NOTE — PSYCH
"Behavioral Health Psychotherapy Progress Note    Psychotherapy Provided: Individual Psychotherapy     1. Bipolar I disorder, most recent episode (or current) manic (HCC)        2. Generalized anxiety disorder            Goals addressed in session: Goal 1 and Goal 2     DATA: Focus of the session was on anxiety and worry about employment. Reviewed ct's chart and treatment plan to identify goals for therapy and explore symptoms and challenges. Ct shared his last day at work is Friday and he has 3 interviews planned. Ct shared this past year has been challenging due to being notified he will be laid off and medications issues. Ct feels stable currently on his regimen. Ct struggles with negative thoughts about self and current situation. Engaged in CBT techniques to demonstrate impact of negative thoughts, catching and challenging negative thoughts. Explored current coping skills and hobbies and his engagement in these. Discussed self talk and challenging of \"worst case scenarios\".   During this session, this clinician used the following therapeutic modalities: Client-centered Therapy, Cognitive Behavioral Therapy, and Supportive Psychotherapy    Substance Abuse was not addressed during this session. If the client is diagnosed with a co-occurring substance use disorder, please indicate any changes in the frequency or amount of use: n/a. Stage of change for addressing substance use diagnoses: No substance use/Not applicable    ASSESSMENT:  Francois Corrales presents with a Euthymic/ normal mood.     his affect is Normal range and intensity, which is congruent, with his mood and the content of the session. The client has made progress on their goals.    Eye contact, speech, thought process were appropriate. Francois Corrales presents with a none risk of suicide, none risk of self-harm, and none risk of harm to others.    For any risk assessment that surpasses a \"low\" rating, a safety plan must be developed.    A safety plan " was indicated: no  If yes, describe in detail n/a    PLAN: Between sessions, Francois Corrales will utilize and review provided materials on thoughts. At the next session, the therapist will use Client-centered Therapy, Cognitive Behavioral Therapy, Cognitive Processing Therapy, and Supportive Psychotherapy to address depression/worry.    Behavioral Health Treatment Plan and Discharge Planning: Francois Corrales is aware of and agrees to continue to work on their treatment plan. They have identified and are working toward their discharge goals. yes    Visit start and stop times:    08/07/24  Start Time: 1300  Stop Time: 1350  Total Visit Time: 50 minutes

## 2024-08-09 ENCOUNTER — OFFICE VISIT (OUTPATIENT)
Dept: PSYCHIATRY | Facility: CLINIC | Age: 54
End: 2024-08-09
Payer: COMMERCIAL

## 2024-08-09 DIAGNOSIS — F41.1 GENERALIZED ANXIETY DISORDER: ICD-10-CM

## 2024-08-09 DIAGNOSIS — F31.70 BIPOLAR DISORDER IN FULL REMISSION, MOST RECENT EPISODE UNSPECIFIED TYPE (HCC): Primary | ICD-10-CM

## 2024-08-09 DIAGNOSIS — G47.00 INSOMNIA, UNSPECIFIED TYPE: ICD-10-CM

## 2024-08-09 PROCEDURE — 99212 OFFICE O/P EST SF 10 MIN: CPT

## 2024-08-09 RX ORDER — ZOLPIDEM TARTRATE 10 MG/1
10 TABLET ORAL
Qty: 30 TABLET | Refills: 1 | Status: SHIPPED | OUTPATIENT
Start: 2024-08-09

## 2024-08-09 RX ORDER — LORAZEPAM 1 MG/1
1 TABLET ORAL DAILY PRN
Qty: 30 TABLET | Refills: 0 | Status: SHIPPED | OUTPATIENT
Start: 2024-08-09 | End: 2024-10-08

## 2024-08-09 RX ORDER — BUPROPION HYDROCHLORIDE 150 MG/1
150 TABLET ORAL DAILY
Qty: 90 TABLET | Refills: 1 | Status: SHIPPED | OUTPATIENT
Start: 2024-08-09

## 2024-08-19 ENCOUNTER — SOCIAL WORK (OUTPATIENT)
Dept: BEHAVIORAL/MENTAL HEALTH CLINIC | Facility: CLINIC | Age: 54
End: 2024-08-19
Payer: COMMERCIAL

## 2024-08-19 DIAGNOSIS — F41.1 GENERALIZED ANXIETY DISORDER: ICD-10-CM

## 2024-08-19 DIAGNOSIS — F31.76 BIPOLAR I DISORDER, MOST RECENT EPISODE DEPRESSED, IN FULL REMISSION (HCC): ICD-10-CM

## 2024-08-19 DIAGNOSIS — F31.10 BIPOLAR I DISORDER, MOST RECENT EPISODE (OR CURRENT) MANIC (HCC): Primary | ICD-10-CM

## 2024-08-19 PROCEDURE — 90837 PSYTX W PT 60 MINUTES: CPT | Performed by: COUNSELOR

## 2024-08-19 NOTE — PSYCH
Behavioral Health Psychotherapy Progress Note    Psychotherapy Provided: Individual Psychotherapy     1. Bipolar I disorder, most recent episode (or current) manic (HCC)        2. Generalized anxiety disorder        3. Bipolar I disorder, most recent episode depressed, in full remission (HCC)            Goals addressed in session: Goal 1 and Goal 2     DATA: Focus of the session was on depression. Ct informed of attending 2-3 interviews since last session and is hoping to hear back soon. Ct expressed more depression this week due to having more time alone and more time to reflect and think about his job loss. Completed time line to help clinician gain a deeper understanding of past year. Ct expressed that he was doing great earlier in the year but then had a med change and at the same time his previous employer department was shut down leading to a lay off of workers. Ct expressed it is difficult to find a job in his field as it is a niche and very competitive. Ct shared he has finances covered until for a year but anxiety about this is present. Demonstrated anxiety cycle and [place] of control, ct feels his mind has been on finding work - he has been very proactive in searches and interviews but the field is saturated and difficult to get in. Discussed ways to balance time and side aside time to focus on job while using other time to engage in enjoyment, learning a new hobby, forming new relationships, etc. Discussed perspective shifts to help with negative thoughts about self and his current situation to create a balanced view. Ct feels that his job loss is the primary stressor in his life and without this loss he feels depression would not be present.   During this session, this clinician used the following therapeutic modalities: Client-centered Therapy, Cognitive Behavioral Therapy, Cognitive Processing Therapy, and Supportive Psychotherapy    Substance Abuse was not addressed during this session. If the client  "is diagnosed with a co-occurring substance use disorder, please indicate any changes in the frequency or amount of use: n/a. Stage of change for addressing substance use diagnoses: No substance use/Not applicable    ASSESSMENT:  Francois Corrales presents with a Euthymic/ normal mood.     his affect is Normal range and intensity, which is congruent, with his mood and the content of the session. The client has made progress on their goals.    Eye contact, speech, thought process were appropriate. Francois Corrales presents with a none risk of suicide, none risk of self-harm, and none risk of harm to others.    For any risk assessment that surpasses a \"low\" rating, a safety plan must be developed.    A safety plan was indicated: no  If yes, describe in detail n/a    PLAN: Between sessions, Francois Corrales will utilize reframing, engage in a new activity/hobby. At the next session, the therapist will use Client-centered Therapy, Cognitive Behavioral Therapy, Cognitive Processing Therapy, and Solution-Focused Therapy to address depression relating to job loss.    Behavioral Health Treatment Plan and Discharge Planning: Francois Corrales is aware of and agrees to continue to work on their treatment plan. They have identified and are working toward their discharge goals. yes    Visit start and stop times:    08/19/24  Start Time: 0800  Stop Time: 0858  Total Visit Time: 58 minutes  "

## 2024-09-05 ENCOUNTER — SOCIAL WORK (OUTPATIENT)
Dept: BEHAVIORAL/MENTAL HEALTH CLINIC | Facility: CLINIC | Age: 54
End: 2024-09-05
Payer: COMMERCIAL

## 2024-09-05 DIAGNOSIS — F41.1 GENERALIZED ANXIETY DISORDER: ICD-10-CM

## 2024-09-05 DIAGNOSIS — F31.10 BIPOLAR I DISORDER, MOST RECENT EPISODE (OR CURRENT) MANIC (HCC): Primary | ICD-10-CM

## 2024-09-05 PROCEDURE — 90834 PSYTX W PT 45 MINUTES: CPT | Performed by: COUNSELOR

## 2024-09-05 NOTE — PSYCH
"Psychiatric Medication Management - Behavioral Health   Francois Corrales 53 y.o. male MRN: [id number]    Reason for Visit:   Chief Complaint   Patient presents with    Medication Management       Subjective:  Medication compliance: yes  Medication side effects: hayde Parrish is a 54 y/o male being seen for medication management today. Patient has psychiatric diagnoses including bipolar disorder and generalized anxiety disorder. Patient is currently being observed on Wellbutrin  mg daily, propranolol 20 mg BID, Ambien 10 mg at bedtime, Lamictal 100 mg BID, and Ativan 1 mg daily as needed for anxiety. Patient is currently connected to outpatient therapy at [facility], [place]. No additional services in place at this time.    Patient presents today reporting \"pretty good\" mood. He states that he got a new job and he is looking forward to starting this on Monday. Francois appears generally euthymic throughout today's encounter. He reports that sleep has been good, getting about 7 to 8 hours each night. Energy and motivation levels have improved and they have been good recently. Appetite remains good, eating 2-3 meals a day. Patient denies any significant mood swings, frequent crying spells, elevated moods, irritability, or aggression. Francois rates his depression a 2/10 on a severity scale today and he rates his anxiety a 3/10. Patient reports that he is comfortable where his medications are at this time and he has no concerns today. Francois reports that he gets some physical anxiety in the mornings but the lorazepam helps calm him and he feels that this is an appropriate dose at this time. Patient reports that he would feel comfortable following up in about 2 months. Patient denies SI/HI. Denies access to guns or weapons. Denies AH/VH. Encouraged patient to call the office with any questions or concerns before follow-up.    Review Of Systems:     Constitutional Negative   ENT Negative   Cardiovascular Negative "   Respiratory Negative   Gastrointestinal Negative   Genitourinary Negative   Musculoskeletal Negative   Integumentary Negative   Neurological Negative   Endocrine Negative     Past Medical History:   Patient Active Problem List   Diagnosis    Bipolar disorder (HCC)    Borderline hyperlipidemia    Sleep apnea    Obesity (BMI 30.0-34.9)    Obstructive sleep apnea syndrome    Insomnia    Generalized anxiety disorder    Encounter for long-term (current) use of other medications    Bipolar I disorder, most recent episode (or current) manic (HCC)       Allergies: No Known Allergies    Past Surgical History:   Past Surgical History:   Procedure Laterality Date    CHOLECYSTECTOMY      COLONOSCOPY      HERNIA REPAIR         Past Psychiatric History:   Patient is a previous patient of Andrew Hsieh and Dr. Wilde     Medication trials: Citalopram 20mg- s/e lethergy;  Hydroxyzine 10mg -s/e sedation, Lexapro, Abilify (increased anxiety)   Hospitalizations: Never Hospitalized , denies h/o IOP or  PHP  ER visit for increased anxiety in March 2024   Violence- yes, punching walls and lashing out or breaking things if he's agitated; last occurred in July 2023- acute stressors, never voilence towards others   SIB/SA- denies    Family Psychiatric History:   Aunt - bipolar   Sister- anxiety   Dad- Alcoholism  Mom- Paranoia    Social History:   Patient lives with his wife and 15 y/o daughter   Occupation =    Hobbies = cars     Substance Abuse History:   Tobacco - never  Illicit - last used cannabis about 20 yrs ago   Alcohol - couple times per month 2-3 beers , he previously drink 4-5 . Denies h/o blackout seizure, DUI    The following portions of the patient's history were reviewed and updated as appropriate: allergies, current medications, past family history, past medical history, past social history, past surgical history, and problem list.    Objective:  There were no vitals filed for this visit.     "  Weight (last 2 days)       None          Labs: Labs 03/24/2024 WNL CBC diff, CMP, TSH, UA, negative UTOX, EKG NSR     Mental status:  Appearance Sitting comfortably in chair, dressed in casual clothing, adequate hygiene and grooming, fair eye contact   Mood \"Pretty good\"   Affect Appears brighter on approach, constricted at times, smiles appropriately, stable   Speech Normal rate and rhythm   Thought Processes Linear and goal directed   Associations intact associations   Hallucinations Denies any auditory or visual hallucinations   Thought Content No passive or active suicidal or homicidal ideation, intent, or plan.   Orientation Oriented to person, place, time, and situation   Recent and Remote Memory Grossly intact   Attention Span and Concentration Concentration intact, Inattentive at times   Intellect Not Formally Assessed   Insight Insight intact   Judgement judgment was intact   Muscle Strength Muscle strength and tone were normal   Language Within normal limits   Fund of Knowledge Age appropriate   Pain None       Assessment/Plan:     Impression:  Generalized anxiety disorder - improving      Bipolar disorder - generally stable      Continue Wellbutrin  mg daily to help with depressive symptoms  Continue Lamictal 100 mg BID to continue to help with mood stabilization   Continue Lorazepam 1 mg daily as needed for increased anxiety   Continue Ambien dosing to 10 mg at bedtime for insomnia concerns   Continue propranolol 20 mg BID as needed for anxiety   Recommended continuation of outpatient therapy at [facility] [place]   Medical follow up with PCP as needed  Follow up in 2 months      Diagnoses:   Diagnoses and all orders for this visit:    Bipolar disorder in full remission, most recent episode unspecified type (HCC)  -     lamoTRIgine (LaMICtal) 100 mg tablet; Take 1 tablet (100 mg total) by mouth 2 (two) times a day    Generalized anxiety disorder  -     propranolol (INDERAL) 20 mg tablet; Take " 1 tablet (20 mg total) by mouth every 12 (twelve) hours Hold if BP < 100/<60  -     LORazepam (ATIVAN) 1 mg tablet; Take 1 tablet (1 mg total) by mouth daily as needed for anxiety  -     LORazepam (ATIVAN) 1 mg tablet; Take 1 tablet (1 mg total) by mouth daily as needed for anxiety Do not start before 2024.    Bipolar I disorder in remission (HCC)  -     buPROPion (WELLBUTRIN XL) 300 mg 24 hr tablet; Bupropion ER 300m tablet po daily in the morning    Bipolar I disorder, most recent episode (or current) manic (HCC)  -     propranolol (INDERAL) 20 mg tablet; Take 1 tablet (20 mg total) by mouth every 12 (twelve) hours Hold if BP < 100/<60    Insomnia, unspecified type  -     zolpidem (AMBIEN) 10 mg tablet; Take 1 tablet (10 mg total) by mouth daily at bedtime as needed for sleep Do not start before 2024.          Treatment Recommendations:      Risks, Benefits And Possible Side Effects Of Medications:  Risks, benefits, and possible side effects of medications explained to patient and family, they verbalize understanding    Controlled Medication Discussion: The patient has been filling controlled prescriptions on time as prescribed to [place] Prescription Drug Monitoring program.      Treatment Plan: Next treatment plan due 10/22/2024.    Visit Time    Visit Start Time: 858  Visit Stop Time: 909  Total Visit Duration:  11 minutes      Natali Velazquez PA-C  24

## 2024-09-05 NOTE — PSYCH
"Behavioral Health Psychotherapy Progress Note    Psychotherapy Provided: Individual Psychotherapy     1. Bipolar I disorder, most recent episode (or current) manic (HCC)        2. Generalized anxiety disorder            Goals addressed in session: Goal 1 and Goal 2     DATA: Focus of the session was on anxiety associated with starting a new job next week. Ct provided news that he found a job and will be starting on Monday. Ct communicated sense of relief and stress associated with this but noticed increased anxiety and experiencing imposture syndrome with this. Provided educational article on this experience and ways of navigating and managing. Identified list of concrete achievements and reframing skills to reframe negative thoughts. Ct identified decrease in depression - 2/10 and anxiety a 4/10. Ct feels overall positive but is struggling with feelings of imposture syndrome. Engaged in feeling wheel activity to process feelings of anxiety and fear associated with failure and \"not feeling good enough\".   During this session, this clinician used the following therapeutic modalities: Cognitive Behavioral Therapy, Cognitive Processing Therapy, Dialectical Behavior Therapy, and Supportive Psychotherapy    Substance Abuse was not addressed during this session. If the client is diagnosed with a co-occurring substance use disorder, please indicate any changes in the frequency or amount of use: n/a. Stage of change for addressing substance use diagnoses: Contemplation    ASSESSMENT:  Francois Corrales presents with a Euthymic/ normal mood.     his affect is Normal range and intensity, which is congruent, with his mood and the content of the session. The client has made progress on their goals.    Eye contact, speech, thought process were appropriate. Francois Corrales presents with a none risk of suicide, none risk of self-harm, and none risk of harm to others.    For any risk assessment that surpasses a \"low\" rating, a " safety plan must be developed.    A safety plan was indicated: no  If yes, describe in detail n/a    PLAN: Between sessions, Francois Corrales will utilize provided resources. At the next session, the therapist will use Client-centered Therapy, Cognitive Behavioral Therapy, Cognitive Processing Therapy, Dialectical Behavior Therapy, and Supportive Psychotherapy to address anxiety with work.    Behavioral Health Treatment Plan and Discharge Planning: Francois Corrales is aware of and agrees to continue to work on their treatment plan. They have identified and are working toward their discharge goals. yes    Visit start and stop times:    09/05/24  Start Time: 1000  Stop Time: 1052  Total Visit Time: 52 minutes

## 2024-09-06 ENCOUNTER — OFFICE VISIT (OUTPATIENT)
Dept: PSYCHIATRY | Facility: CLINIC | Age: 54
End: 2024-09-06
Payer: COMMERCIAL

## 2024-09-06 DIAGNOSIS — F41.1 GENERALIZED ANXIETY DISORDER: ICD-10-CM

## 2024-09-06 DIAGNOSIS — G47.00 INSOMNIA, UNSPECIFIED TYPE: ICD-10-CM

## 2024-09-06 DIAGNOSIS — F31.70 BIPOLAR DISORDER IN FULL REMISSION, MOST RECENT EPISODE UNSPECIFIED TYPE (HCC): Primary | ICD-10-CM

## 2024-09-06 DIAGNOSIS — F31.10 BIPOLAR I DISORDER, MOST RECENT EPISODE (OR CURRENT) MANIC (HCC): ICD-10-CM

## 2024-09-06 DIAGNOSIS — F31.70 BIPOLAR I DISORDER IN REMISSION (HCC): ICD-10-CM

## 2024-09-06 PROCEDURE — 99212 OFFICE O/P EST SF 10 MIN: CPT

## 2024-09-06 RX ORDER — PROPRANOLOL HYDROCHLORIDE 20 MG/1
20 TABLET ORAL EVERY 12 HOURS SCHEDULED
Qty: 60 TABLET | Refills: 2 | Status: SHIPPED | OUTPATIENT
Start: 2024-09-06

## 2024-09-06 RX ORDER — ZOLPIDEM TARTRATE 10 MG/1
10 TABLET ORAL
Qty: 30 TABLET | Refills: 0 | Status: SHIPPED | OUTPATIENT
Start: 2024-10-04

## 2024-09-06 RX ORDER — LORAZEPAM 1 MG/1
1 TABLET ORAL DAILY PRN
Qty: 30 TABLET | Refills: 0 | Status: SHIPPED | OUTPATIENT
Start: 2024-10-04

## 2024-09-06 RX ORDER — BUPROPION HYDROCHLORIDE 300 MG/1
TABLET ORAL
Qty: 90 TABLET | Refills: 1 | Status: SHIPPED | OUTPATIENT
Start: 2024-09-06

## 2024-09-06 RX ORDER — LORAZEPAM 1 MG/1
1 TABLET ORAL DAILY PRN
Qty: 30 TABLET | Refills: 0 | Status: SHIPPED | OUTPATIENT
Start: 2024-09-06 | End: 2024-11-05

## 2024-09-06 RX ORDER — LAMOTRIGINE 100 MG/1
100 TABLET ORAL 2 TIMES DAILY
Qty: 60 TABLET | Refills: 2 | Status: SHIPPED | OUTPATIENT
Start: 2024-09-06

## 2024-09-09 DIAGNOSIS — G47.00 INSOMNIA, UNSPECIFIED TYPE: Primary | ICD-10-CM

## 2024-09-09 RX ORDER — ZOLPIDEM TARTRATE 10 MG/1
10 TABLET ORAL
Qty: 30 TABLET | Refills: 0 | Status: SHIPPED | OUTPATIENT
Start: 2024-09-09

## 2024-11-05 NOTE — PSYCH
Psychiatric Medication Management - Behavioral Health   Francois Corrales 53 y.o. male MRN: [id number]    Reason for Visit:   Chief Complaint   Patient presents with    Medication Management     Assessment & Plan  Bipolar disorder in full remission, most recent episode unspecified type (HCC)  Stable   Continue Wellbutrin  mg daily to help with depressive symptoms  Continue Lamictal 100 mg BID to continue to help with mood stabilization   Recommended continuation of outpatient therapy at [facility]   Orders:    lamoTRIgine (LaMICtal) 100 mg tablet; Take 1 tablet (100 mg total) by mouth 2 (two) times a day    buPROPion (Wellbutrin XL) 150 mg 24 hr tablet; Take 1 tablet (150 mg total) by mouth daily Take in addition to 300 mg tablet for total daily dose of 450 mg    Generalized anxiety disorder  Stable   Continue Lorazepam 1 mg daily as needed for increased anxiety   Continue propranolol 20 mg BID as needed for anxiety   Recommended continuation of outpatient therapy at [facility]   Orders:    propranolol (INDERAL) 20 mg tablet; Take 1 tablet (20 mg total) by mouth every 12 (twelve) hours Hold if BP < 100/<60    LORazepam (ATIVAN) 1 mg tablet; Take 1 tablet (1 mg total) by mouth daily as needed for anxiety    LORazepam (ATIVAN) 1 mg tablet; Take 1 tablet (1 mg total) by mouth daily as needed for anxiety Do not start before December 6, 2024.    LORazepam (ATIVAN) 1 mg tablet; Take 1 tablet (1 mg total) by mouth daily as needed for anxiety Do not start before January 3, 2025.    Insomnia, unspecified type  Stable   Continue Ambien dosing to 10 mg at bedtime for insomnia concerns   Recommended continuation of outpatient therapy at [facility]   Orders:    zolpidem (AMBIEN) 10 mg tablet; Take 1 tablet (10 mg total) by mouth daily at bedtime as needed for sleep    zolpidem (AMBIEN) 10 mg tablet; Take 1 tablet (10 mg total) by mouth daily at bedtime as needed for sleep Do not start before December 6,  "2024.    zolpidem (AMBIEN) 10 mg tablet; Take 1 tablet (10 mg total) by mouth daily at bedtime as needed for sleep Do not start before January 3, 2025.       Assessment/Plan:     Impression:  Generalized anxiety disorder - generally stable   Bipolar disorder - generally stable   Insomnia, unspecified - stable      Continue Wellbutrin  mg daily to help with depressive symptoms  Continue Lamictal 100 mg BID to continue to help with mood stabilization   Continue Lorazepam 1 mg daily as needed for increased anxiety   Continue Ambien dosing to 10 mg at bedtime for insomnia concerns   Continue propranolol 20 mg BID as needed for anxiety   Recommended continuation of outpatient therapy at [facility]   Medical follow up with PCP as needed  Follow up in 3 months      Treatment Plan: A treatment plan was completed and patient verbally consented at today's visit. Next treatment plan due 5/8/2025. Next crisis plan due 4/22/2025.     Treatment Recommendations:      Risks, Benefits And Possible Side Effects Of Medications:  Risks, benefits, and possible side effects of medications explained to patient and family, they verbalize understanding    Controlled Medication Discussion: The patient has been filling controlled prescriptions on time as prescribed to [place] Prescription Drug Monitoring program.        Subjective:  Medication compliance: yes  Medication side effects: hayde Parrish is a 52 y/o male being seen for medication management today. Patient has psychiatric diagnoses including bipolar disorder and generalized anxiety disorder. Patient is currently being observed on Wellbutrin  mg daily, propranolol 20 mg BID, Ambien 10 mg at bedtime, Lamictal 100 mg BID, and Ativan 1 mg daily as needed for anxiety. Patient is currently connected to outpatient therapy at [facility]. No additional services in place at this time.    Patient presents today reporting \"good\" mood. Reports that things are " good and work is going well. Sleep has been good, getting about 7-8 hours each night. No issues falling asleep or staying asleep. The ambien continues to work well for him. Energy and motivation levels have been good. Appetite has been good, eating 2-3 meals a day. Patient denies any significant mood swings, crying spells, irritability, aggression, or elevated moods. Patient rates their depression a 2/10 on a severity scale today and they rate their anxiety a 2/10. Patient reports that he was going to therapy but he wasn't seeing too much benefit now so he will take a little bit of a break. Francois explains everything has been going well and he has no questions or concerns at this time. Patient feels comfortable on his medications and he would like to continue them at the same dosages. He denies SI/HI. Denies access to guns or weapons. Denies AH/VH. Encouraged patient to call the office with any questions or concerns. Francois feels comfortable following up in about 3 months.     Review Of Systems:     Constitutional Negative   ENT Negative   Cardiovascular Negative   Respiratory Negative   Gastrointestinal Negative   Genitourinary Negative   Musculoskeletal Negative   Integumentary Negative   Neurological Negative   Endocrine Negative     Past Medical History:   Patient Active Problem List   Diagnosis    Bipolar disorder (HCC)    Borderline hyperlipidemia    Sleep apnea    Obesity (BMI 30.0-34.9)    Obstructive sleep apnea syndrome    Insomnia    Generalized anxiety disorder    Encounter for long-term (current) use of other medications    Bipolar I disorder, most recent episode (or current) manic (HCC)       Allergies: No Known Allergies    Past Surgical History:   Past Surgical History:   Procedure Laterality Date    CHOLECYSTECTOMY      COLONOSCOPY      HERNIA REPAIR         Past Psychiatric History:   Patient is a previous patient of Andrew Hsieh and Dr. Wilde      Medication trials: Citalopram 20mg- s/e  "lethergy;  Hydroxyzine 10mg -s/e sedation, Lexapro, Abilify (increased anxiety)   Hospitalizations: Never Hospitalized , denies h/o IOP or  PHP  ER visit for increased anxiety in March 2024   Violence- yes, punching walls and lashing out or breaking things if he's agitated; last occurred in July 2023- acute stressors, never voilence towards others   SIB/SA- denies     Family Psychiatric History:   Aunt - bipolar   Sister- anxiety   Dad- Alcoholism  Mom- Paranoia     Social History:   Patient lives with his wife and 15 y/o daughter   Occupation =    Hobbies = cars      Substance Abuse History:   Tobacco - never  Illicit - last used cannabis about 20 yrs ago   Alcohol - couple times per month 2-3 beers , he previously drink 4-5 . Denies h/o blackout seizure, DUI    The following portions of the patient's history were reviewed and updated as appropriate: allergies, current medications, past family history, past medical history, past social history, past surgical history, and problem list.    Objective:  There were no vitals filed for this visit.      Weight (last 2 days)       None            Labs: Labs 03/24/2024 WNL CBC diff, CMP, TSH, UA, negative UTOX, EKG NSR      Mental status:  Appearance sitting comfortably in chair, dressed in casual clothing, adequate hygiene and grooming, cooperative with interview, good eye contact   Mood \"Good\"   Affect Appears generally euthymic, stable, mood-congruent   Speech Normal rate, rhythm, and volume   Thought Processes Linear and goal directed   Associations intact associations   Hallucinations Denies any auditory or visual hallucinations   Thought Content No passive or active suicidal or homicidal ideation, intent, or plan.   Orientation Oriented to person, place, time, and situation   Recent and Remote Memory Grossly intact   Attention Span and Concentration Concentration intact   Intellect Appears to be of Average Intelligence   Insight Insight " intact   Judgement judgment was intact   Muscle Strength Muscle strength and tone were normal   Language Within normal limits   Fund of Knowledge Age appropriate   Pain None       Visit Time    Visit Start Time: 0800  Visit Stop Time: 0814  Total Visit Duration:  14 minutes      Natali Velazquez PA-C  11/08/24

## 2024-11-08 ENCOUNTER — OFFICE VISIT (OUTPATIENT)
Dept: PSYCHIATRY | Facility: CLINIC | Age: 54
End: 2024-11-08
Payer: COMMERCIAL

## 2024-11-08 DIAGNOSIS — F31.10 BIPOLAR I DISORDER, MOST RECENT EPISODE (OR CURRENT) MANIC (HCC): ICD-10-CM

## 2024-11-08 DIAGNOSIS — G47.00 INSOMNIA, UNSPECIFIED TYPE: ICD-10-CM

## 2024-11-08 DIAGNOSIS — F31.70 BIPOLAR DISORDER IN FULL REMISSION, MOST RECENT EPISODE UNSPECIFIED TYPE (HCC): Primary | ICD-10-CM

## 2024-11-08 DIAGNOSIS — F41.1 GENERALIZED ANXIETY DISORDER: ICD-10-CM

## 2024-11-08 PROCEDURE — 99214 OFFICE O/P EST MOD 30 MIN: CPT

## 2024-11-08 RX ORDER — ZOLPIDEM TARTRATE 10 MG/1
10 TABLET ORAL
Qty: 30 TABLET | Refills: 0 | Status: SHIPPED | OUTPATIENT
Start: 2024-11-08

## 2024-11-08 RX ORDER — ZOLPIDEM TARTRATE 10 MG/1
10 TABLET ORAL
Qty: 30 TABLET | Refills: 0 | Status: SHIPPED | OUTPATIENT
Start: 2025-01-03

## 2024-11-08 RX ORDER — ZOLPIDEM TARTRATE 10 MG/1
10 TABLET ORAL
Qty: 30 TABLET | Refills: 0 | Status: SHIPPED | OUTPATIENT
Start: 2024-12-06

## 2024-11-08 RX ORDER — BUPROPION HYDROCHLORIDE 150 MG/1
150 TABLET ORAL DAILY
Qty: 90 TABLET | Refills: 0 | Status: SHIPPED | OUTPATIENT
Start: 2024-11-08

## 2024-11-08 RX ORDER — LORAZEPAM 1 MG/1
1 TABLET ORAL DAILY PRN
Qty: 30 TABLET | Refills: 0 | Status: SHIPPED | OUTPATIENT
Start: 2025-01-03

## 2024-11-08 RX ORDER — LORAZEPAM 1 MG/1
1 TABLET ORAL DAILY PRN
Qty: 30 TABLET | Refills: 0 | Status: SHIPPED | OUTPATIENT
Start: 2024-11-08

## 2024-11-08 RX ORDER — LAMOTRIGINE 100 MG/1
100 TABLET ORAL 2 TIMES DAILY
Qty: 60 TABLET | Refills: 2 | Status: SHIPPED | OUTPATIENT
Start: 2024-11-08

## 2024-11-08 RX ORDER — LORAZEPAM 1 MG/1
1 TABLET ORAL DAILY PRN
Qty: 30 TABLET | Refills: 0 | Status: SHIPPED | OUTPATIENT
Start: 2024-12-06 | End: 2025-03-08

## 2024-11-08 RX ORDER — PROPRANOLOL HCL 20 MG
20 TABLET ORAL EVERY 12 HOURS SCHEDULED
Qty: 60 TABLET | Refills: 2 | Status: SHIPPED | OUTPATIENT
Start: 2024-11-08

## 2024-11-08 NOTE — BH TREATMENT PLAN
TREATMENT PLAN (Medication Management Only)        [facility]    Name and Date of Birth:  Francois Corrales 53 y.o. 1970  Date of Treatment Plan: November 8, 2024  Diagnosis/Diagnoses:    1. Bipolar disorder in full remission, most recent episode unspecified type (HCC)    2. Generalized anxiety disorder    3. Insomnia, unspecified type      Strengths/Personal Resources for Self-Care: supportive family, taking medications as prescribed, ability to adapt to life changes.  Area/Areas of need (in own words): anxiety, depression  1. Long Term Goal: continue improvement in mood, maintain acceptable level of anxiety   Target Date:6 months - 5/8/2025  Person/Persons responsible for completion of goal: Francois  2.  Short Term Objective (s) - How will we reach this goal?:   A. Provider new recommended medication/dosage changes and/or continue medication(s): continue current medications as prescribed.  B. Attend medication management appointments regularly.  C. Attend psychotherapy regularly (with Jayme Arzola)   Target Date:6 months - 5/8/2025  Person/Persons Responsible for Completion of Goal: Francois  Progress Towards Goals: stable  Treatment Modality: medication management every 3 months  Review due 180 days from date of this plan: 6 months - 5/8/2025  Expected length of service: maintenance  My Physician/PA/NP and I have developed this plan together and I agree to work on the goals and objectives. I understand the treatment goals that were developed for my treatment.

## 2024-11-08 NOTE — ASSESSMENT & PLAN NOTE
Stable   Continue Lorazepam 1 mg daily as needed for increased anxiety   Continue propranolol 20 mg BID as needed for anxiety   Recommended continuation of outpatient therapy at [facility]   Orders:    propranolol (INDERAL) 20 mg tablet; Take 1 tablet (20 mg total) by mouth every 12 (twelve) hours Hold if BP < 100/<60    LORazepam (ATIVAN) 1 mg tablet; Take 1 tablet (1 mg total) by mouth daily as needed for anxiety    LORazepam (ATIVAN) 1 mg tablet; Take 1 tablet (1 mg total) by mouth daily as needed for anxiety Do not start before December 6, 2024.    LORazepam (ATIVAN) 1 mg tablet; Take 1 tablet (1 mg total) by mouth daily as needed for anxiety Do not start before January 3, 2025.

## 2024-11-08 NOTE — ASSESSMENT & PLAN NOTE
Stable   Continue Ambien dosing to 10 mg at bedtime for insomnia concerns   Recommended continuation of outpatient therapy at [facility]   Orders:    zolpidem (AMBIEN) 10 mg tablet; Take 1 tablet (10 mg total) by mouth daily at bedtime as needed for sleep    zolpidem (AMBIEN) 10 mg tablet; Take 1 tablet (10 mg total) by mouth daily at bedtime as needed for sleep Do not start before December 6, 2024.    zolpidem (AMBIEN) 10 mg tablet; Take 1 tablet (10 mg total) by mouth daily at bedtime as needed for sleep Do not start before January 3, 2025.

## 2024-11-08 NOTE — ASSESSMENT & PLAN NOTE
Stable   Continue Wellbutrin  mg daily to help with depressive symptoms  Continue Lamictal 100 mg BID to continue to help with mood stabilization   Recommended continuation of outpatient therapy at [facility]   Orders:    lamoTRIgine (LaMICtal) 100 mg tablet; Take 1 tablet (100 mg total) by mouth 2 (two) times a day    buPROPion (Wellbutrin XL) 150 mg 24 hr tablet; Take 1 tablet (150 mg total) by mouth daily Take in addition to 300 mg tablet for total daily dose of 450 mg

## 2025-01-29 DIAGNOSIS — F41.1 GENERALIZED ANXIETY DISORDER: ICD-10-CM

## 2025-01-29 RX ORDER — PROPRANOLOL HCL 20 MG
20 TABLET ORAL EVERY 12 HOURS SCHEDULED
Qty: 180 TABLET | Refills: 1 | OUTPATIENT
Start: 2025-01-29

## 2025-02-04 DIAGNOSIS — F41.1 GENERALIZED ANXIETY DISORDER: ICD-10-CM

## 2025-02-04 RX ORDER — PROPRANOLOL HCL 20 MG
20 TABLET ORAL 2 TIMES DAILY PRN
Qty: 180 TABLET | Refills: 1 | Status: SHIPPED | OUTPATIENT
Start: 2025-02-04

## 2025-02-04 NOTE — TELEPHONE ENCOUNTER
Completed call with patient regarding updating insurance for upcoming appointment. Patient provided [employer] insurance which [facility] does not accept. Patient agreed to self-pay for upcoming appointment and office with help patient with future options.

## 2025-02-06 NOTE — PSYCH
Psychiatric Medication Management - Behavioral Health   Francois Corrales 54 y.o. male MRN: [id number]    Reason for Visit:   Chief Complaint   Patient presents with    Medication Management     Assessment & Plan  Bipolar disorder in full remission, most recent episode unspecified type (HCC)  Stable   Continue Lamictal 100 mg BID to continue to help with mood stabilization   Recommended continuation of outpatient therapy at [facility]     Orders:    lamoTRIgine (LaMICtal) 100 mg tablet; Take 1 tablet (100 mg total) by mouth 2 (two) times a day    buPROPion (Wellbutrin XL) 150 mg 24 hr tablet; Take 1 tablet (150 mg total) by mouth daily Take in addition to 300 mg tablet for total daily dose of 450 mg    Generalized anxiety disorder  Stable   Continue Wellbutrin  mg daily to help with depressive symptoms  Continue Lamictal 100 mg BID to continue to help with mood stabilization   Continue Lorazepam 1 mg daily as needed for increased anxiety   Continue Ambien dosing to 10 mg at bedtime for insomnia concerns   Continue propranolol 20 mg BID as needed for anxiety   Recommended continuation of outpatient therapy at [facility]     Orders:    LORazepam (ATIVAN) 1 mg tablet; Take 1 tablet (1 mg total) by mouth daily as needed for anxiety    LORazepam (ATIVAN) 1 mg tablet; Take 1 tablet (1 mg total) by mouth daily as needed for anxiety Do not start before March 7, 2025.    LORazepam (ATIVAN) 1 mg tablet; Take 1 tablet (1 mg total) by mouth daily as needed for anxiety Do not start before April 4, 2025.       Assessment/Plan:     Impression:  Generalized anxiety disorder - generally stable  Bipolar disorder - generally stable   Insomnia, unspecified - stable      Continue Wellbutrin  mg daily to help with depressive symptoms  Continue Lamictal 100 mg BID to continue to help with mood stabilization   Continue Lorazepam 1 mg daily as needed for increased anxiety   Continue Ambien dosing to 10 mg at bedtime for  "insomnia concerns   Continue propranolol 20 mg BID as needed for anxiety   Recommended continuation of outpatient therapy at [facility] [place]   Medical follow up with PCP as needed  Follow up in 4 months      Treatment Plan: Next treatment plan due 5/8/2025. Next crisis plan due 4/22/2025.     Treatment Recommendations:      Risks, Benefits And Possible Side Effects Of Medications:  Risks, benefits, and possible side effects of medications explained to patient and family, they verbalize understanding    Controlled Medication Discussion: The patient has been filling controlled prescriptions on time as prescribed to [place] Prescription Drug Monitoring program.        Subjective:  Medication compliance: yes  Medication side effects: hayde Parrish is a 55 y/o male being seen for medication management today. Patient has psychiatric diagnoses including bipolar disorder and generalized anxiety disorder. Patient is currently being observed on Wellbutrin  mg daily, propranolol 20 mg BID, Ambien 10 mg at bedtime, Lamictal 100 mg BID, and Ativan 1 mg daily as needed for anxiety. Patient is currently connected to outpatient therapy at [facility] [place]. No additional services in place at this time.    Patient presents today reporting \"good\" mood. He states that things have been going really well and he has no complaints. Sleep has been good overall, getting about 6-8 hours on average. No issues falling asleep or staying asleep. Energy and motivation levels have been good. Work has been going well and he has been going into the office twice a week. Appetite has been good, eating about 2-3 meals daily. Patient denies any significant mood swings, crying spells, irritability, aggression, or elevated moods. Patient rates their depression a 1/10 on a severity scale today and they rate their anxiety a 2/10. Reports some anxiety here and there but this is very manageable for him. He does not have any concerns today and " wishes to stay on his current medication regimen. Denies SI/HI. Denies access to guns or weapons. Denies AH/VH. Encouraged patient to call the office with any questions or concerns. Francois feels comfortable following up in about 4 months.     Review Of Systems:     Constitutional Negative   ENT Negative   Cardiovascular Negative   Respiratory Negative   Gastrointestinal Negative   Genitourinary Negative   Musculoskeletal Negative   Integumentary Negative   Neurological Negative   Endocrine Negative     Past Medical History:   Patient Active Problem List   Diagnosis    Bipolar disorder (HCC)    Borderline hyperlipidemia    Sleep apnea    Obesity (BMI 30.0-34.9)    Obstructive sleep apnea syndrome    Insomnia    Generalized anxiety disorder    Encounter for long-term (current) use of other medications    Bipolar I disorder, most recent episode (or current) manic (HCC)       Allergies: No Known Allergies    Past Surgical History:   Past Surgical History:   Procedure Laterality Date    CHOLECYSTECTOMY      COLONOSCOPY      HERNIA REPAIR         Past Psychiatric History:   Patient is a previous patient of Andrew Hsieh and Dr. Wilde      Medication trials: Citalopram 20mg- s/e lethergy;  Hydroxyzine 10mg -s/e sedation, Lexapro, Abilify (increased anxiety)   Hospitalizations: Never Hospitalized , denies h/o IOP or  PHP  ER visit for increased anxiety in March 2024   Violence- yes, punching walls and lashing out or breaking things if he's agitated; last occurred in July 2023- acute stressors, never voilence towards others   SIB/SA- denies     Family Psychiatric History:   Aunt - bipolar   Sister- anxiety   Dad- Alcoholism  Mom- Paranoia     Social History:   Patient lives with his wife and 15 y/o daughter   Occupation =    Hobbies = cars      Substance Abuse History:   Tobacco - never  Illicit - last used cannabis about 20 yrs ago   Alcohol - couple times per month 2-3 beers , he previously drink  "4-5 . Denies h/o blackout seizure, DUI    The following portions of the patient's history were reviewed and updated as appropriate: allergies, current medications, past family history, past medical history, past social history, past surgical history, and problem list.    Objective:  There were no vitals filed for this visit.      Weight (last 2 days)       None          Labs: Labs 03/24/2024 WNL CBC diff, CMP, TSH, UA, negative UTOX, EKG NSR      Mental status:  Appearance sitting comfortably in chair, dressed in casual clothing, adequate hygiene and grooming, cooperative with interview, good eye contact   Mood \"Good\"   Affect Appears generally euthymic, stable, mood-congruent   Speech Normal rate, rhythm, and volume   Thought Processes Linear and goal directed   Associations intact associations   Hallucinations Denies any auditory or visual hallucinations   Thought Content No passive or active suicidal or homicidal ideation, intent, or plan.   Orientation Oriented to person, place, time, and situation   Recent and Remote Memory Grossly intact   Attention Span and Concentration Concentration intact   Intellect Appears to be of Average Intelligence   Insight Insight intact   Judgement judgment was intact   Muscle Strength Muscle strength and tone were normal   Language Within normal limits   Fund of Knowledge Age appropriate   Pain None     Visit Time    Visit Start Time: 0800  Visit Stop Time: 0814  Total Visit Duration:  14 minutes    Natali Velazquez PA-C  02/07/25    "

## 2025-02-07 ENCOUNTER — OFFICE VISIT (OUTPATIENT)
Dept: PSYCHIATRY | Facility: CLINIC | Age: 55
End: 2025-02-07

## 2025-02-07 DIAGNOSIS — G47.00 INSOMNIA, UNSPECIFIED TYPE: ICD-10-CM

## 2025-02-07 DIAGNOSIS — F31.70 BIPOLAR I DISORDER IN REMISSION (HCC): ICD-10-CM

## 2025-02-07 DIAGNOSIS — F41.1 GENERALIZED ANXIETY DISORDER: ICD-10-CM

## 2025-02-07 DIAGNOSIS — F31.70 BIPOLAR DISORDER IN FULL REMISSION, MOST RECENT EPISODE UNSPECIFIED TYPE (HCC): Primary | ICD-10-CM

## 2025-02-07 PROCEDURE — 99214 OFFICE O/P EST MOD 30 MIN: CPT

## 2025-02-07 RX ORDER — BUPROPION HYDROCHLORIDE 150 MG/1
150 TABLET ORAL DAILY
Qty: 90 TABLET | Refills: 1 | Status: SHIPPED | OUTPATIENT
Start: 2025-02-07

## 2025-02-07 RX ORDER — ZOLPIDEM TARTRATE 10 MG/1
10 TABLET ORAL
Qty: 30 TABLET | Refills: 0 | Status: SHIPPED | OUTPATIENT
Start: 2025-03-07

## 2025-02-07 RX ORDER — BUPROPION HYDROCHLORIDE 300 MG/1
TABLET ORAL
Qty: 90 TABLET | Refills: 1 | Status: SHIPPED | OUTPATIENT
Start: 2025-02-07

## 2025-02-07 RX ORDER — LORAZEPAM 1 MG/1
1 TABLET ORAL DAILY PRN
Qty: 30 TABLET | Refills: 0 | Status: SHIPPED | OUTPATIENT
Start: 2025-02-07

## 2025-02-07 RX ORDER — ZOLPIDEM TARTRATE 10 MG/1
10 TABLET ORAL
Qty: 30 TABLET | Refills: 0 | Status: SHIPPED | OUTPATIENT
Start: 2025-04-04

## 2025-02-07 RX ORDER — LAMOTRIGINE 100 MG/1
100 TABLET ORAL 2 TIMES DAILY
Qty: 180 TABLET | Refills: 1 | Status: SHIPPED | OUTPATIENT
Start: 2025-02-07

## 2025-02-07 RX ORDER — LORAZEPAM 1 MG/1
1 TABLET ORAL DAILY PRN
Qty: 30 TABLET | Refills: 0 | Status: SHIPPED | OUTPATIENT
Start: 2025-04-04

## 2025-02-07 RX ORDER — ZOLPIDEM TARTRATE 10 MG/1
10 TABLET ORAL
Qty: 30 TABLET | Refills: 0 | Status: SHIPPED | OUTPATIENT
Start: 2025-02-07

## 2025-02-07 RX ORDER — LORAZEPAM 1 MG/1
1 TABLET ORAL DAILY PRN
Qty: 30 TABLET | Refills: 0 | Status: SHIPPED | OUTPATIENT
Start: 2025-03-07 | End: 2025-06-07

## 2025-02-07 NOTE — ASSESSMENT & PLAN NOTE
Stable   Continue Lamictal 100 mg BID to continue to help with mood stabilization   Recommended continuation of outpatient therapy at [facility]     Orders:    lamoTRIgine (LaMICtal) 100 mg tablet; Take 1 tablet (100 mg total) by mouth 2 (two) times a day    buPROPion (Wellbutrin XL) 150 mg 24 hr tablet; Take 1 tablet (150 mg total) by mouth daily Take in addition to 300 mg tablet for total daily dose of 450 mg

## 2025-02-07 NOTE — ASSESSMENT & PLAN NOTE
Stable   Continue Wellbutrin  mg daily to help with depressive symptoms  Continue Lamictal 100 mg BID to continue to help with mood stabilization   Continue Lorazepam 1 mg daily as needed for increased anxiety   Continue Ambien dosing to 10 mg at bedtime for insomnia concerns   Continue propranolol 20 mg BID as needed for anxiety   Recommended continuation of outpatient therapy at [facility]     Orders:    LORazepam (ATIVAN) 1 mg tablet; Take 1 tablet (1 mg total) by mouth daily as needed for anxiety    LORazepam (ATIVAN) 1 mg tablet; Take 1 tablet (1 mg total) by mouth daily as needed for anxiety Do not start before March 7, 2025.    LORazepam (ATIVAN) 1 mg tablet; Take 1 tablet (1 mg total) by mouth daily as needed for anxiety Do not start before April 4, 2025.

## 2025-03-20 ENCOUNTER — DOCUMENTATION (OUTPATIENT)
Dept: BEHAVIORAL/MENTAL HEALTH CLINIC | Facility: CLINIC | Age: 55
End: 2025-03-20

## 2025-03-20 DIAGNOSIS — F31.10 BIPOLAR I DISORDER, MOST RECENT EPISODE (OR CURRENT) MANIC (HCC): Primary | ICD-10-CM

## 2025-03-20 DIAGNOSIS — F31.76 BIPOLAR I DISORDER, MOST RECENT EPISODE DEPRESSED, IN FULL REMISSION (HCC): ICD-10-CM

## 2025-03-20 DIAGNOSIS — F41.1 GENERALIZED ANXIETY DISORDER: ICD-10-CM

## 2025-03-20 NOTE — PROGRESS NOTES
Psychotherapy Discharge Summary    Preferred Name: Francois Corrales  YOB: 1970    Admission date to psychotherapy: 08/07/25    Referred by: Self    Presenting Problem: Mood management, anxiety    Course of treatment included : individual therapy     Progress/Outcome of Treatment Goals (brief summary of course of treatment) Client met with clinician 3x - focused on developing coping skills to manage/regulate moods    Treatment Complications (if any): n/a    Treatment Progress: fair    Current [facility]iatric Provider: Natali Velazquez    Discharge Medications include: Wellbutrin XL 450mg daily  Lamitcal 100mg BID  Lorazepram 1mg daily as needed  Ambien dosing to 10mg bedtime  Propranolol 20mg BID as needed    Discharge Date: 03/20/25    Discharge Diagnosis:   1. Bipolar I disorder, most recent episode (or current) manic (HCC)        2. Generalized anxiety disorder        3. Bipolar I disorder, most recent episode depressed, in full remission (HCC)            Criteria for Discharge: two or more unexcused absences for services    Patient is cleared to return to [facility] for continued treatment.      Aftercare recommendations include (include specific referral names and phone numbers, if appropriate): Continue med management    Prognosis: good

## 2025-05-05 DIAGNOSIS — G47.00 INSOMNIA, UNSPECIFIED TYPE: ICD-10-CM

## 2025-05-05 RX ORDER — ZOLPIDEM TARTRATE 10 MG/1
10 TABLET ORAL
Qty: 30 TABLET | Refills: 0 | Status: SHIPPED | OUTPATIENT
Start: 2025-05-05

## 2025-06-03 NOTE — PSYCH
MEDICATION MANAGEMENT NOTE    Name: Francois Corrales      : 1970      MRN: [id number]  Encounter Provider: Natali Velazquez PA-C  Encounter Date: 2025   Encounter department: [facility] OUT[facility]    Insurance: Payor: / No coverage found.     Reason for Visit:   Chief Complaint   Patient presents with    Medication Management   :  Assessment & Plan  Bipolar disorder in full remission, most recent episode unspecified type (HCC)  Stable   Continue Lamictal 100 mg BID to continue to help with mood stabilization   Recommended continuation of outpatient therapy at [facility]       Orders:    lamoTRIgine (LaMICtal) 100 mg tablet; Take 1 tablet (100 mg total) by mouth 2 (two) times a day    buPROPion (Wellbutrin XL) 150 mg 24 hr tablet; Take 1 tablet (150 mg total) by mouth daily Take in addition to 300 mg tablet for total daily dose of 450 mg    buPROPion (WELLBUTRIN XL) 300 mg 24 hr tablet; Bupropion ER 300m tablet po daily in the morning     Generalized anxiety disorder  Stable   Continue Wellbutrin  mg daily to help with depressive symptoms  Continue Lamictal 100 mg BID to continue to help with mood stabilization   Continue Lorazepam 1 mg daily as needed for increased anxiety   Continue Ambien dosing to 10 mg at bedtime for insomnia concerns   Continue propranolol 20 mg BID as needed for anxiety   Recommended continuation of outpatient therapy at [facility]       Orders:    LORazepam (ATIVAN) 1 mg tablet; Take 1 tablet (1 mg total) by mouth daily as needed for anxiety    LORazepam (ATIVAN) 1 mg tablet; Take 1 tablet (1 mg total) by mouth daily as needed for anxiety Do not start before 2025.    LORazepam (ATIVAN) 1 mg tablet; Take 1 tablet (1 mg total) by mouth daily as needed for anxiety Do not start before 2025.    propranolol (INDERAL) 20 mg tablet; Take 1 tablet (20 mg total) by mouth 2 (two) times a day as needed (for  anxiety)    LORazepam (ATIVAN) 1 mg tablet; Take 1 tablet (1 mg total) by mouth daily as needed for anxiety Do not start before August 29, 2025.     Insomnia, unspecified type  Continue Ambien dosing to 10 mg at bedtime for insomnia concerns     Orders:    zolpidem (AMBIEN) 10 mg tablet; Take 1 tablet (10 mg total) by mouth daily at bedtime as needed for sleep    zolpidem (AMBIEN) 10 mg tablet; Take 1 tablet (10 mg total) by mouth daily at bedtime as needed for sleep Do not start before July 4, 2025.    zolpidem (AMBIEN) 10 mg tablet; Take 1 tablet (10 mg total) by mouth daily at bedtime as needed for sleep Do not start before August 1, 2025.    zolpidem (AMBIEN) 10 mg tablet; Take 1 tablet (10 mg total) by mouth daily at bedtime as needed for sleep Do not start before August 29, 2025.      Assessment/Plan:      Impression:  Generalized anxiety disorder - generally stable  Bipolar disorder - generally stable   Insomnia, unspecified - stable      Continue Wellbutrin  mg daily to help with depressive symptoms  Continue Lamictal 100 mg BID to continue to help with mood stabilization   Continue Lorazepam 1 mg daily as needed for increased anxiety   Continue Ambien dosing to 10 mg at bedtime for insomnia concerns   Continue propranolol 20 mg BID as needed for anxiety   Recommended continuation of outpatient therapy at [facility]   Medical follow up with PCP as needed  Follow up in 3-4 months     Treatment Recommendations:    Educated about diagnosis and treatment modalities. Verbalizes understanding and agreement with the treatment plan.  Discussed self monitoring of symptoms, and symptom monitoring tools.  Discussed medications and if treatment adjustment was needed or desired.  Aware of 24 hour and weekend coverage for urgent situations accessed by calling [facility] main practice number  I am scheduling this patient out for greater than 3 months: Yes - Patient's stability of symptoms  "warrant this length of time or no significant changes to treatment plan  If sooner appointment needed, patient will reach out    Medications Risks/Benefits:      Risks, Benefits And Possible Side Effects Of Medications:    Risks, benefits, and possible side effects of medications explained to Francois and he (or legal representative) verbalizes understanding and agreement for treatment.    Controlled Medication Discussion:     Francois has been filling controlled prescriptions on time as prescribed according to [place] Prescription Drug Monitoring Program.      History of Present Illness     Subjective:  Medication compliance: yes  Medication side effects: hayde Parrish is a 53 y/o male being seen for medication management today. Patient has psychiatric diagnoses including bipolar disorder and generalized anxiety disorder. Patient is currently being observed on Wellbutrin  mg daily, propranolol 20 mg BID, Ambien 10 mg at bedtime, Lamictal 100 mg BID, and Ativan 1 mg daily as needed for anxiety. Patient is currently connected to outpatient therapy at [facility]. No additional services in place at this time.    Patient presents today reporting \"pretty good\" mood. He reports that he has been doing well and work has been going well. Sleep has been good, getting about 6-7 hours on average. No issues falling asleep or staying asleep. Energy and motivation levels have been generally adequate. Reports that he has some trips planned over the summer which he is looking forward to. Appetite has been good, eating 2-3 meals daily. Patient denies any significant mood swings, crying spells, irritability, aggression, or elevated moods. Patient rates their depression a 2/10 on a severity scale today and they rate their anxiety a 4/10. He reports having some agitation at times but overall his mood has been fairly good and stable. Regarding medications, he feels as though his anxiety and depression are being well " managed. Patient does not wish for any changes today. Francois does not have any other concerns today. Denies SI/HI. Denies access to guns or weapons. Denies AH/VH. Encouraged patient to call the office with any questions or concerns. Francois feels comfortable following up in about 3-4 months.     Review Of Systems: A review of systems is obtained and is negative except for the pertinent positives listed in HPI/Subjective above.      Current Rating Scores:     None completed today.    Areas of Improvement: reviewed in HPI/Subjective Section and reviewed in Assessment and Plan Section      Past Medical History[1]  Past Surgical History[2]  Allergies: Allergies[3]    Current Outpatient Medications   Medication Instructions    buPROPion (WELLBUTRIN XL) 300 mg 24 hr tablet Bupropion ER 300m tablet po daily in the morning    buPROPion (WELLBUTRIN XL) 150 mg, Oral, Daily, Take in addition to 300 mg tablet for total daily dose of 450 mg    lamoTRIgine (LAMICTAL) 100 mg, Oral, 2 times daily    LORazepam (ATIVAN) 1 mg, Oral, Daily PRN    LORazepam (ATIVAN) 1 mg, Oral, Daily PRN    LORazepam (ATIVAN) 1 mg, Oral, Daily PRN    propranolol (INDERAL) 20 mg, Oral, 2 times daily PRN    zolpidem (AMBIEN) 10 mg, Oral, Daily at bedtime PRN    zolpidem (AMBIEN) 10 mg, Oral, Daily at bedtime PRN    zolpidem (AMBIEN) 10 mg, Oral, Daily at bedtime PRN      Past Psychiatric History:   Patient is a previous patient of Andrew Hsieh and Dr. Wilde      Medication trials: Citalopram 20mg- s/e lethergy;  Hydroxyzine 10mg -s/e sedation, Lexapro, Abilify (increased anxiety)   Hospitalizations: Never Hospitalized , denies h/o IOP or  PHP  ER visit for increased anxiety in 2024   Violence- yes, punching walls and lashing out or breaking things if he's agitated; last occurred in 2023- acute stressors, never voilence towards others   SIB/SA- denies    Substance Abuse History:   Tobacco - never  Illicit - last used cannabis about 20  "yrs ago   Alcohol - couple times per month 2-3 beers , he previously drink 4-5 . Denies h/o blackout seizure, DUI    Substance Abuse History:    Tobacco, Alcohol and Drug Use History     Tobacco Use    Smoking status: Never    Smokeless tobacco: Never    Tobacco comments:     04/10/2024 Francois Corrales , he denied smoking nicotine or recent marijuana abuse   Vaping Use    Vaping status: Never Used   Substance Use Topics    Alcohol use: Yes     Alcohol/week: 3.0 standard drinks of alcohol     Types: 3 Cans of beer per week     Comment: A few beers every couple of weeks    Drug use: Not Currently      Social History:   Patient lives with his wife and 15 y/o daughter   Occupation =    Hobbies = cars     Social History:    Social History     Socioeconomic History    Marital status: /Civil Union     Spouse name: Not on file    Number of children: Not on file    Years of education: Not on file    Highest education level: Not on file   Occupational History    Not on file   Other Topics Concern    Not on file   Social History Narrative    Not on file      Family Psychiatric History:   Aunt - bipolar   Sister- anxiety   Dad- Alcoholism  Mom- Paranoia    Family Psychiatric History:     Family History[4]    Medical History Reviewed by provider this encounter:  Tobacco  Allergies  Meds  Problems  Med Hx  Surg Hx  Fam Hx          Objective   There were no vitals taken for this visit.     Mental Status Evaluation:    Appearance age appropriate, casually dressed, dressed appropriately   Behavior pleasant, cooperative, calm   Speech normal rate, normal volume, normal pitch, spontaneous   Mood \"Pretty good\"   Affect normal range and intensity, appropriate, mood-congruent   Thought Processes organized, coherent, goal directed, linear   Thought Content no overt delusions   Perceptual Disturbances: no auditory hallucinations, no visual hallucinations   Abnormal Thoughts  Risk Potential Suicidal " ideation - None  Homicidal ideation - None  Potential for aggression - No   Orientation oriented to person, place, time/date, and situation   Memory recent and remote memory grossly intact   Consciousness alert and awake   Attention Span Concentration Span attention span and concentration are age appropriate   Intellect appears to be of average intelligence   Insight intact   Judgement intact   Muscle Strength and  Gait normal muscle strength and normal muscle tone, normal gait and normal balance   Motor activity no abnormal movements   Language no difficulty naming common objects, no difficulty repeating a phrase, no difficulty writing a sentence   Fund of Knowledge adequate knowledge of current events  adequate fund of knowledge regarding past history  adequate fund of knowledge regarding vocabulary        Laboratory Results: I have personally reviewed all pertinent laboratory/tests results    Recent Labs (last 12 months):   Office Visit on 07/19/2024   Component Date Value    Supplier Name 07/19/2024 [employer]     Supplier Phone Number 07/19/2024 [phone]     Order Status 07/19/2024 Completed     Delivery Request Date 07/19/2024 07/19/2024     Date Delivered  07/19/2024 07/19/2024     Item Description 07/19/2024 PAP Accessory     Item Description 07/19/2024 PAP Mask, Full Face, Fit Upon Setup, N/A, 1 per 3 months     Item Description 07/19/2024 Humidifier Water Chamber, 1 per 6 months     Item Description 07/19/2024 PAP Headgear, 1 per 6 months     Item Description 07/19/2024 Heated PAP Tubing, 1 per 3 months     Item Description 07/19/2024 Disposable PAP Filter, 2 per 1 month     Item Description 07/19/2024 Non-Disposable PAP Filter, 1 per 6 months        Suicide/Homicide Risk Assessment:    Risk of Harm to Self:  Based on today's assessment, Francois presents the following risk of harm to self: minimal    Risk of Harm to Others:  Based on today's assessment, Francois presents the  "following risk of harm to others: minimal    The following interventions are recommended: Continue medication management. No other intervention changes indicated at this time.    Psychotherapy Provided:     Individual psychotherapy provided: No    Treatment Plan:    Completed and signed during the session: Yes - with Francois.    Goals: Progress towards Treatment Plan goals - Yes, progressing, as evidenced by subjective findings in HPI/Subjective Section and in Assessment and Plan Section    Depression Follow-up Plan Completed: Yes    Note Share:    This note was shared with patient.    Administrative Statements   Administrative Statements   I have spent a total time of 15 minutes in caring for this patient on the day of the visit/encounter including Prognosis, Risks and benefits of tx options, Instructions for management, Patient and family education, Importance of tx compliance, and Documenting in the medical record.    Visit Time  Visit Start Time: 0803  Visit Stop Time: 0818  Total Visit Duration: 15 minutes    Portions of the record may have been created with voice recognition software. Occasional wrong word or \"sound a like\" substitutions may have occurred due to the inherent limitations of voice recognition software. Read the chart carefully and recognize, using context, where substitutions have occurred.    Natali Velazquez PA-C 06/06/25         [1]   Past Medical History:  Diagnosis Date    Bipolar 1 disorder (HCC)     Moderate cannabis use disorder (HCC) 12/05/2022    Sleep apnea     pt uses CPAP    Sleep apnea, obstructive    [2]   Past Surgical History:  Procedure Laterality Date    CHOLECYSTECTOMY      COLONOSCOPY      HERNIA REPAIR     [3] No Known Allergies  [4]   Family History  Problem Relation Name Age of Onset    Diabetes Mother Antoni     Cancer Mother Antoni         Uterine cancer    Cancer Father Robert Sr         Lung    COPD Father Robert Sr     Coronary artery disease Father Robert Sr      "

## 2025-06-06 ENCOUNTER — OFFICE VISIT (OUTPATIENT)
Dept: PSYCHIATRY | Facility: CLINIC | Age: 55
End: 2025-06-06
Payer: COMMERCIAL

## 2025-06-06 DIAGNOSIS — F31.70 BIPOLAR DISORDER IN FULL REMISSION, MOST RECENT EPISODE UNSPECIFIED TYPE (HCC): Primary | ICD-10-CM

## 2025-06-06 DIAGNOSIS — F31.70 BIPOLAR I DISORDER IN REMISSION (HCC): ICD-10-CM

## 2025-06-06 DIAGNOSIS — G47.00 INSOMNIA, UNSPECIFIED TYPE: ICD-10-CM

## 2025-06-06 DIAGNOSIS — F41.1 GENERALIZED ANXIETY DISORDER: ICD-10-CM

## 2025-06-06 PROCEDURE — 99214 OFFICE O/P EST MOD 30 MIN: CPT

## 2025-06-06 RX ORDER — BUPROPION HYDROCHLORIDE 150 MG/1
150 TABLET ORAL DAILY
Qty: 90 TABLET | Refills: 0 | Status: SHIPPED | OUTPATIENT
Start: 2025-06-06

## 2025-06-06 RX ORDER — LORAZEPAM 1 MG/1
1 TABLET ORAL DAILY PRN
Qty: 30 TABLET | Refills: 0 | Status: SHIPPED | OUTPATIENT
Start: 2025-06-06

## 2025-06-06 RX ORDER — ZOLPIDEM TARTRATE 10 MG/1
10 TABLET ORAL
Qty: 30 TABLET | Refills: 0 | Status: SHIPPED | OUTPATIENT
Start: 2025-08-29

## 2025-06-06 RX ORDER — ZOLPIDEM TARTRATE 10 MG/1
10 TABLET ORAL
Qty: 30 TABLET | Refills: 0 | Status: SHIPPED | OUTPATIENT
Start: 2025-07-04

## 2025-06-06 RX ORDER — BUPROPION HYDROCHLORIDE 300 MG/1
TABLET ORAL
Qty: 90 TABLET | Refills: 0 | Status: SHIPPED | OUTPATIENT
Start: 2025-06-06

## 2025-06-06 RX ORDER — LORAZEPAM 1 MG/1
1 TABLET ORAL DAILY PRN
Qty: 30 TABLET | Refills: 0 | Status: SHIPPED | OUTPATIENT
Start: 2025-07-04 | End: 2025-10-04

## 2025-06-06 RX ORDER — LORAZEPAM 1 MG/1
1 TABLET ORAL DAILY PRN
Qty: 30 TABLET | Refills: 0 | Status: SHIPPED | OUTPATIENT
Start: 2025-08-29

## 2025-06-06 RX ORDER — LAMOTRIGINE 100 MG/1
100 TABLET ORAL 2 TIMES DAILY
Qty: 180 TABLET | Refills: 0 | Status: SHIPPED | OUTPATIENT
Start: 2025-06-06

## 2025-06-06 RX ORDER — PROPRANOLOL HCL 20 MG
20 TABLET ORAL 2 TIMES DAILY PRN
Qty: 180 TABLET | Refills: 1 | Status: SHIPPED | OUTPATIENT
Start: 2025-06-06

## 2025-06-06 RX ORDER — LORAZEPAM 1 MG/1
1 TABLET ORAL DAILY PRN
Qty: 30 TABLET | Refills: 0 | Status: SHIPPED | OUTPATIENT
Start: 2025-08-01

## 2025-06-06 RX ORDER — ZOLPIDEM TARTRATE 10 MG/1
10 TABLET ORAL
Qty: 30 TABLET | Refills: 0 | Status: SHIPPED | OUTPATIENT
Start: 2025-08-01

## 2025-06-06 RX ORDER — ZOLPIDEM TARTRATE 10 MG/1
10 TABLET ORAL
Qty: 30 TABLET | Refills: 0 | Status: SHIPPED | OUTPATIENT
Start: 2025-06-06

## 2025-06-06 NOTE — ASSESSMENT & PLAN NOTE
Stable   Continue Lamictal 100 mg BID to continue to help with mood stabilization   Recommended continuation of outpatient therapy at [facility]       Orders:    lamoTRIgine (LaMICtal) 100 mg tablet; Take 1 tablet (100 mg total) by mouth 2 (two) times a day    buPROPion (Wellbutrin XL) 150 mg 24 hr tablet; Take 1 tablet (150 mg total) by mouth daily Take in addition to 300 mg tablet for total daily dose of 450 mg    buPROPion (WELLBUTRIN XL) 300 mg 24 hr tablet; Bupropion ER 300m tablet po daily in the morning

## 2025-06-06 NOTE — BH CRISIS PLAN
Client Name: Francois Corrales       Client YOB: 1970    [place]are[place] Safety Plan      Creation Date: 6/6/25 Update Date: 6/6/26   Created By: Natali Velazquez PA-C Last Updated By: Natali Velazquez PA-C      Step 1: Warning Signs:   Warning Signs   depression - hard time leaving bed   avoiding people   nanda - more fidgety            Step 2: Internal Coping Strategies:   Internal Coping Strategies   weighted blanket   bedroom rest/peaceful environment   walking            Step 3: People and social settings that provide distraction:   Name Contact Information   Cousin Kirti number in cell phone    Places   Home   Driving back roads           Step 4: People whom I can ask for help during a crisis:      Name Contact Information    Wife (Yo) number in cell phone    Cousin Kirti number in cell phone      Step 5: Professionals or agencies I can contact during a crisis:      Clinican/Agency Name Phone Emergency Contact    Natali Velazquez PA-C [phone]       Local Emergency Department Emergency Department Phone Emergency Department Address    911          Crisis Phone Numbers:   Suicide Prevention Lifeline: Call or Text  573 Crisis Text Line: Text HOME to 799-309   Please note: Some [facility] do not have a separate number for Child/Adolescent specific crisis. If your county is not listed under Child/Adolescent, please call the adult number for your county      Adult Crisis Numbers: Child/Adolescent Crisis Numbers   [facility]: [phone] [facility]: [phone]   [facility]: [phone] [facility]: [phone]   [facility]: [phone] [place], [place]: [phone]   [facility]: [phone] [place]: [phone]   [place] [facility]: [phone]   [facility]: [phone]   [facility]: [phone]   [facility]: [phone] (daytime) [phone] (after hours, weekends, holidays)      Step 6: Making  the environment safer (plan for lethal means safety):   Plan: No firearms in the house      Optional: What is most important to me and worth living for?   Family      [employer] Safety Plan. Elsy Ramon and Merlin Kramer. Used with permission of the authors.

## 2025-06-06 NOTE — BH TREATMENT PLAN
TREATMENT PLAN (Medication Management Only)        [facility]    Name and Date of Birth:  Francois Corrales 54 y.o. 1970  MRN: [id number]  Date of Treatment Plan: June 6, 2025  Diagnosis/Diagnoses:    1. Bipolar disorder in full remission, most recent episode unspecified type (HCC)    2. Generalized anxiety disorder    3. Insomnia, unspecified type    4. Bipolar I disorder in remission (HCC)      Strengths/Personal Resources for Self-Care: supportive family, taking medications as prescribed.  Area/Areas of need (in own words): anxiety, depression, mood instability  1. Long Term Goal:   continue improvement in mood.  Target Date:6 months - 12/6/2025  Person/Persons responsible for completion of goal: Francois  2.  Short Term Objective (s) - How will we reach this goal?:   A.  Provider new recommended medication/dosage changes and/or continue medication(s): continue current medications as prescribed.  B.  Attend medication management appointments regularly..  C.  N/A.  Target Date:6 months - 12/6/2025  Person/Persons Responsible for Completion of Goal: Francois  Progress Towards Goals: stable  Treatment Modality: medication management every 3 months  Review due 180 days from date of this plan: 6 months - 12/6/2025  Expected length of service: maintenance unless revised  My Physician/PA/NP and I have developed this plan together and I agree to work on the goals and objectives. I understand the treatment goals that were developed for my treatment.   Electronic Signatures: on file (unless signed below)    Natali Velazquez PA-C 06/06/25

## 2025-06-06 NOTE — ASSESSMENT & PLAN NOTE
Stable   Continue Wellbutrin  mg daily to help with depressive symptoms  Continue Lamictal 100 mg BID to continue to help with mood stabilization   Continue Lorazepam 1 mg daily as needed for increased anxiety   Continue Ambien dosing to 10 mg at bedtime for insomnia concerns   Continue propranolol 20 mg BID as needed for anxiety   Recommended continuation of outpatient therapy at [facility]       Orders:    LORazepam (ATIVAN) 1 mg tablet; Take 1 tablet (1 mg total) by mouth daily as needed for anxiety    LORazepam (ATIVAN) 1 mg tablet; Take 1 tablet (1 mg total) by mouth daily as needed for anxiety Do not start before July 4, 2025.    LORazepam (ATIVAN) 1 mg tablet; Take 1 tablet (1 mg total) by mouth daily as needed for anxiety Do not start before August 1, 2025.    propranolol (INDERAL) 20 mg tablet; Take 1 tablet (20 mg total) by mouth 2 (two) times a day as needed (for anxiety)    LORazepam (ATIVAN) 1 mg tablet; Take 1 tablet (1 mg total) by mouth daily as needed for anxiety Do not start before August 29, 2025.

## 2025-06-06 NOTE — ASSESSMENT & PLAN NOTE
Continue Ambien dosing to 10 mg at bedtime for insomnia concerns     Orders:    zolpidem (AMBIEN) 10 mg tablet; Take 1 tablet (10 mg total) by mouth daily at bedtime as needed for sleep    zolpidem (AMBIEN) 10 mg tablet; Take 1 tablet (10 mg total) by mouth daily at bedtime as needed for sleep Do not start before July 4, 2025.    zolpidem (AMBIEN) 10 mg tablet; Take 1 tablet (10 mg total) by mouth daily at bedtime as needed for sleep Do not start before August 1, 2025.    zolpidem (AMBIEN) 10 mg tablet; Take 1 tablet (10 mg total) by mouth daily at bedtime as needed for sleep Do not start before August 29, 2025.

## 2025-06-13 ENCOUNTER — TELEPHONE (OUTPATIENT)
Dept: FAMILY MEDICINE CLINIC | Facility: CLINIC | Age: 55
End: 2025-06-13

## 2025-06-13 DIAGNOSIS — E78.2 MIXED HYPERLIPIDEMIA: Primary | ICD-10-CM

## 2025-06-13 DIAGNOSIS — Z12.5 SCREENING FOR PROSTATE CANCER: ICD-10-CM

## 2025-06-13 NOTE — TELEPHONE ENCOUNTER
Called and schedule patient. Patient is scheduled for physical 6/27/2025.  Labs ordered to [facility]patient aware

## 2025-06-24 LAB
ALBUMIN SERPL-MCNC: 4.6 G/DL (ref 3.6–5.1)
ALBUMIN/GLOB SERPL: 2.4 (CALC) (ref 1–2.5)
ALP SERPL-CCNC: 69 U/L (ref 35–144)
ALT SERPL-CCNC: 31 U/L (ref 9–46)
AST SERPL-CCNC: 26 U/L (ref 10–35)
BILIRUB SERPL-MCNC: 1.1 MG/DL (ref 0.2–1.2)
BUN SERPL-MCNC: 12 MG/DL (ref 7–25)
BUN/CREAT SERPL: NORMAL (CALC) (ref 6–22)
CALCIUM SERPL-MCNC: 9.4 MG/DL (ref 8.6–10.3)
CHLORIDE SERPL-SCNC: 102 MMOL/L (ref 98–110)
CHOLEST SERPL-MCNC: 228 MG/DL
CHOLEST/HDLC SERPL: 4.6 (CALC)
CO2 SERPL-SCNC: 29 MMOL/L (ref 20–32)
CREAT SERPL-MCNC: 1.09 MG/DL (ref 0.7–1.3)
GFR/BSA.PRED SERPLBLD CYS-BASED-ARV: 81 ML/MIN/1.73M2
GLOBULIN SER CALC-MCNC: 1.9 G/DL (CALC) (ref 1.9–3.7)
GLUCOSE SERPL-MCNC: 96 MG/DL (ref 65–99)
HDLC SERPL-MCNC: 50 MG/DL
LDLC SERPL CALC-MCNC: 149 MG/DL (CALC)
NONHDLC SERPL-MCNC: 178 MG/DL (CALC)
POTASSIUM SERPL-SCNC: 4 MMOL/L (ref 3.5–5.3)
PROT SERPL-MCNC: 6.5 G/DL (ref 6.1–8.1)
PSA SERPL-MCNC: 0.6 NG/ML
SODIUM SERPL-SCNC: 139 MMOL/L (ref 135–146)
TRIGL SERPL-MCNC: 157 MG/DL

## 2025-06-27 ENCOUNTER — TELEPHONE (OUTPATIENT)
Dept: FAMILY MEDICINE CLINIC | Facility: CLINIC | Age: 55
End: 2025-06-27

## 2025-06-27 ENCOUNTER — OFFICE VISIT (OUTPATIENT)
Dept: FAMILY MEDICINE CLINIC | Facility: CLINIC | Age: 55
End: 2025-06-27
Payer: COMMERCIAL

## 2025-06-27 VITALS
SYSTOLIC BLOOD PRESSURE: 126 MMHG | HEART RATE: 73 BPM | HEIGHT: 71 IN | TEMPERATURE: 97.1 F | BODY MASS INDEX: 35.95 KG/M2 | DIASTOLIC BLOOD PRESSURE: 80 MMHG | OXYGEN SATURATION: 97 % | WEIGHT: 256.8 LBS

## 2025-06-27 DIAGNOSIS — F31.70 BIPOLAR DISORDER IN FULL REMISSION, MOST RECENT EPISODE UNSPECIFIED TYPE (HCC): ICD-10-CM

## 2025-06-27 DIAGNOSIS — Z00.00 ANNUAL PHYSICAL EXAM: Primary | ICD-10-CM

## 2025-06-27 DIAGNOSIS — E78.5 BORDERLINE HYPERLIPIDEMIA: ICD-10-CM

## 2025-06-27 DIAGNOSIS — E66.812 CLASS 2 SEVERE OBESITY DUE TO EXCESS CALORIES WITH SERIOUS COMORBIDITY AND BODY MASS INDEX (BMI) OF 36.0 TO 36.9 IN ADULT (HCC): ICD-10-CM

## 2025-06-27 DIAGNOSIS — E66.01 CLASS 2 SEVERE OBESITY DUE TO EXCESS CALORIES WITH SERIOUS COMORBIDITY AND BODY MASS INDEX (BMI) OF 36.0 TO 36.9 IN ADULT (HCC): ICD-10-CM

## 2025-06-27 PROCEDURE — 99214 OFFICE O/P EST MOD 30 MIN: CPT | Performed by: NURSE PRACTITIONER

## 2025-06-27 PROCEDURE — 99396 PREV VISIT EST AGE 40-64: CPT | Performed by: NURSE PRACTITIONER

## 2025-06-27 RX ORDER — TIRZEPATIDE 2.5 MG/.5ML
2.5 INJECTION, SOLUTION SUBCUTANEOUS WEEKLY
Qty: 2 ML | Refills: 0 | Status: SHIPPED | OUTPATIENT
Start: 2025-06-27 | End: 2025-07-25

## 2025-06-27 RX ORDER — TADALAFIL 5 MG/1
5 TABLET ORAL DAILY PRN
COMMUNITY

## 2025-06-27 NOTE — PROGRESS NOTES
Adult Annual Physical  Name: Francois Corrales      : 1970      MRN: [id number]  Encounter Provider: ALEK Horan  Encounter Date: 2025   Encounter department: [facility]    :  Assessment & Plan  Annual physical exam  Patient is doing well. Labs and medications reviewed.       Class 2 severe obesity due to excess calories with serious comorbidity and body mass index (BMI) of 36.0 to 36.9 in adult (HCC)  Prior Authorization Clinical Questions for Weight Management Pharmacotherapy    1. Does the patient have a contrainidcation to medication prescribed for weight management?: No  2. Does the patient have a diagnosis of obesity, confirmed by a BMI greater than or equal to 30 kg/m^2?: Yes  3. Does the patient have a BMI of greater than or equal to 27 kg/m^2 with at least one weight-related comorbidity/risk factor/complication (e.g. diabetes, dyslipidemia, coronary artery disease)?: Yes  4. Weight-related co-morbidities/risk factors: dyslipidemia, obstructive sleep apnea (ISAAK), depression  5. WEGOVY CVA Indication: Does patient have established documented cardiovascular disease (history of a prior heart attack (myocardial infarction), stroke, or symptomatic peripheral arterial disease (PAD)?: No  6. ZEPBOUND ISAAK Indication: Does patient have documented ISAAK diagnosed via sleep study (insurance will require copy of sleep study results for approval)?: Yes  7. Has the patient been on a weight loss regimen of low-calorie diet, increased physical activity, and lifestyle modifications for a minimum of 6 months?: Yes  8. Has the patient completed a comprehensive weight loss program (ie, Weight Watchers, Noom, Bariatrics, other priya on phone)? If so, what?: Yes   -- Q8 Further Explanation: apps on phone   9. Does the patient have a history of type 2 diabetes?: No  10. Has the member tried and failed other weight loss medication within the past 12 months?: No  11. Will the  member use requested medication in combination with another GLP agonist or weight loss drug?: No  12. Is the medication a controlled substance?: No     Baseline weight (in pounds): 256.8 lbs  Current weight (in pounds): 256.8 lbs  Weight loss percentage: 0%         Orders:    tirzepatide (Zepbound) 2.5 mg/0.5 mL auto-injector; Inject 0.5 mL (2.5 mg total) under the skin once a week for 28 days    Borderline hyperlipidemia  Labs reviewed with patient. T.cholesterol 228, ldl 157, triglycerides 157. Lifestyle modifications discussed. Repeat labs 3-6 months.         Bipolar disorder in full remission, most recent episode unspecified type (HCC)  Stable. Continue current medications. Follows with psych.         Annual physical exam               Preventive Screenings:  - Diabetes Screening: screening up-to-date  - Cholesterol Screening: screening not indicated and has hyperlipidemia   - Hepatitis C screening: patient declines   - HIV screening: patient declines   - Colon cancer screening: screening up-to-date   - Lung cancer screening: screening not indicated   - Prostate cancer screening: screening up-to-date     Immunizations:  - Immunizations due: Prevnar 20 and Zoster (Shingrix)  - Risks/benefits immunizations discussed      Counseling/Anticipatory Guidance:    - Dental health: discussed importance of regular tooth brushing, flossing, and dental visits.   - Diet: discussed recommendations for a healthy/well-balanced diet.   - Exercise: the importance of regular exercise/physical activity was discussed. Recommend exercise 3-5 times per week for at least 30 minutes.   - Injury prevention: discussed safety/seat belts, safety helmets, smoke detectors, carbon monoxide detectors, and smoking near bedding or upholstery.          History of Present Illness     Adult Annual Physical:  Patient presents for annual physical. Here for annual physical.  Discuss weight management..     Diet and Physical Activity:  - Diet/Nutrition:  well balanced diet.  - Exercise: walking and 3-4 times a week on average.    Depression Screening:  - PHQ-2 Score: 0    General Health:  - Sleep: sleeps well.  - Hearing: normal hearing bilateral ears.  - Vision: most recent eye exam < 1 year ago.  - Dental: regular dental visits.     Health:    - Urinary symptoms: none.     Advanced Care Planning:  - Has an advanced directive?: yes    - Has a durable medical POA?: yes      Review of Systems   Constitutional:  Negative for activity change, appetite change, chills, diaphoresis, fatigue and fever.   HENT:  Negative for congestion, dental problem, drooling, ear discharge, ear pain, facial swelling, hearing loss, mouth sores, nosebleeds, postnasal drip, rhinorrhea, sinus pressure, sinus pain, sneezing, sore throat, tinnitus, trouble swallowing and voice change.    Eyes:  Negative for photophobia, pain, discharge, redness, itching and visual disturbance.   Respiratory:  Negative for apnea, cough, choking, chest tightness, shortness of breath, wheezing and stridor.    Cardiovascular:  Negative for chest pain, palpitations and leg swelling.   Gastrointestinal:  Negative for abdominal distention, abdominal pain, anal bleeding, blood in stool, constipation, diarrhea, nausea, rectal pain and vomiting.   Endocrine: Negative for cold intolerance, heat intolerance, polydipsia, polyphagia and polyuria.   Genitourinary:  Negative for decreased urine volume, difficulty urinating, dysuria, enuresis, flank pain, frequency, genital sores, hematuria, penile discharge, penile pain, penile swelling, scrotal swelling, testicular pain and urgency.   Musculoskeletal:  Negative for arthralgias, back pain, gait problem, joint swelling, myalgias, neck pain and neck stiffness.   Skin:  Negative for color change, pallor, rash and wound.   Neurological:  Negative for dizziness, tremors, seizures, syncope, facial asymmetry, speech difficulty, weakness, light-headedness, numbness and headaches.  "  Hematological:  Negative for adenopathy. Does not bruise/bleed easily.   Psychiatric/Behavioral:  Negative for agitation, behavioral problems, confusion, decreased concentration, dysphoric mood, hallucinations, self-injury, sleep disturbance and suicidal ideas. The patient is not nervous/anxious and is not hyperactive.      Medications Ordered Prior to Encounter[1]     Objective   /80 (BP Location: Left arm, Patient Position: Sitting, Cuff Size: Standard)   Pulse 73   Temp (!) 97.1 °F (36.2 °C) (Tympanic)   Ht 5' 10.5\" (1.791 m)   Wt 116 kg (256 lb 12.8 oz)   SpO2 97%   BMI 36.33 kg/m²     Physical Exam  Vitals and nursing note reviewed.   Constitutional:       General: He is not in acute distress.     Appearance: Normal appearance. He is well-developed. He is obese. He is not ill-appearing, toxic-appearing or diaphoretic.   HENT:      Head: Normocephalic and atraumatic.      Right Ear: Tympanic membrane, ear canal and external ear normal. There is no impacted cerumen.      Left Ear: Tympanic membrane, ear canal and external ear normal. There is no impacted cerumen.      Nose: Nose normal. No congestion or rhinorrhea.      Right Sinus: No maxillary sinus tenderness or frontal sinus tenderness.      Left Sinus: No maxillary sinus tenderness or frontal sinus tenderness.      Mouth/Throat:      Mouth: Mucous membranes are moist.      Pharynx: Oropharynx is clear. Uvula midline. No oropharyngeal exudate or posterior oropharyngeal erythema.     Eyes:      General:         Right eye: No discharge.         Left eye: No discharge.      Extraocular Movements: Extraocular movements intact.      Conjunctiva/sclera: Conjunctivae normal.      Pupils: Pupils are equal, round, and reactive to light.     Neck:      Thyroid: No thyromegaly.      Vascular: No carotid bruit.      Trachea: No tracheal deviation.     Cardiovascular:      Rate and Rhythm: Normal rate and regular rhythm.      Heart sounds: Normal heart " sounds. No murmur heard.     No friction rub. No gallop.   Pulmonary:      Effort: Pulmonary effort is normal. No respiratory distress.      Breath sounds: Normal breath sounds. No stridor. No wheezing, rhonchi or rales.   Chest:      Chest wall: No tenderness.   Abdominal:      General: Bowel sounds are normal. There is no distension.      Palpations: Abdomen is soft. There is no mass.      Tenderness: There is no abdominal tenderness. There is no right CVA tenderness, left CVA tenderness, guarding or rebound.      Hernia: No hernia is present.     Musculoskeletal:         General: No swelling, tenderness, deformity or signs of injury. Normal range of motion.      Cervical back: Normal range of motion and neck supple. No rigidity or tenderness.      Right lower leg: No edema.      Left lower leg: No edema.   Lymphadenopathy:      Cervical: No cervical adenopathy.     Skin:     General: Skin is warm and dry.      Capillary Refill: Capillary refill takes less than 2 seconds.      Coloration: Skin is not jaundiced or pale.      Findings: No bruising, erythema, lesion or rash.     Neurological:      General: No focal deficit present.      Mental Status: He is alert and oriented to person, place, and time.      Cranial Nerves: No cranial nerve deficit.      Sensory: No sensory deficit.      Motor: No weakness.      Coordination: Coordination normal.      Gait: Gait normal.      Deep Tendon Reflexes: Reflexes normal.     Psychiatric:         Mood and Affect: Mood normal.         Speech: Speech normal.         Behavior: Behavior normal.         Thought Content: Thought content normal.         Judgment: Judgment normal.       Administrative Statements   I have spent a total time of 30 minutes in caring for this patient on the day of the visit/encounter including Diagnostic results, Risks and benefits of tx options, Instructions for management, Patient and family education, Importance of tx compliance, Risk factor  reductions, Documenting in the medical record, Reviewing/placing orders in the medical record (including tests, medications, and/or procedures), and Obtaining or reviewing history  .       [1]   Current Outpatient Medications on File Prior to Visit   Medication Sig Dispense Refill    buPROPion (Wellbutrin XL) 150 mg 24 hr tablet Take 1 tablet (150 mg total) by mouth daily Take in addition to 300 mg tablet for total daily dose of 450 mg 90 tablet 0    buPROPion (WELLBUTRIN XL) 300 mg 24 hr tablet Bupropion ER 300m tablet po daily in the morning 90 tablet 0    lamoTRIgine (LaMICtal) 100 mg tablet Take 1 tablet (100 mg total) by mouth 2 (two) times a day 180 tablet 0    LORazepam (ATIVAN) 1 mg tablet Take 1 tablet (1 mg total) by mouth daily as needed for anxiety 30 tablet 0    propranolol (INDERAL) 20 mg tablet Take 1 tablet (20 mg total) by mouth 2 (two) times a day as needed (for anxiety) 180 tablet 1    tadalafil (CIALIS) 5 MG tablet Take 5 mg by mouth daily as needed for erectile dysfunction      zolpidem (AMBIEN) 10 mg tablet Take 1 tablet (10 mg total) by mouth daily at bedtime as needed for sleep 30 tablet 0    [DISCONTINUED] LORazepam (ATIVAN) 1 mg tablet Take 1 tablet (1 mg total) by mouth daily as needed for anxiety Do not start before 2025. (Patient not taking: Reported on 2025 Do not start before 2025.) 30 tablet 0    [DISCONTINUED] LORazepam (ATIVAN) 1 mg tablet Take 1 tablet (1 mg total) by mouth daily as needed for anxiety Do not start before 2025. (Patient not taking: Reported on 2025 Do not start before 2025.) 30 tablet 0    [DISCONTINUED] LORazepam (ATIVAN) 1 mg tablet Take 1 tablet (1 mg total) by mouth daily as needed for anxiety Do not start before 2025. (Patient not taking: Reported on 2025 Do not start before 2025.) 30 tablet 0    [DISCONTINUED] zolpidem (AMBIEN) 10 mg tablet Take 1 tablet (10 mg total) by mouth daily  at bedtime as needed for sleep Do not start before July 4, 2025. (Patient not taking: Reported on 6/27/2025 Do not start before July 4, 2025.) 30 tablet 0    [DISCONTINUED] zolpidem (AMBIEN) 10 mg tablet Take 1 tablet (10 mg total) by mouth daily at bedtime as needed for sleep Do not start before August 1, 2025. (Patient not taking: Reported on 6/27/2025 Do not start before August 1, 2025.) 30 tablet 0    [DISCONTINUED] zolpidem (AMBIEN) 10 mg tablet Take 1 tablet (10 mg total) by mouth daily at bedtime as needed for sleep Do not start before August 29, 2025. (Patient not taking: Reported on 6/27/2025 Do not start before August 29, 2025.) 30 tablet 0     No current facility-administered medications on file prior to visit.

## 2025-06-27 NOTE — PATIENT INSTRUCTIONS
"Patient Education     Routine physical for adults   The Basics   Written by the doctors and editors at [facility]   What is a physical? -- A physical is a routine visit, or \"check-up,\" with your doctor. You might also hear it called a \"wellness visit\" or \"preventive visit.\"  During each visit, the doctor will:   Ask about your physical and mental health   Ask about your habits, behaviors, and lifestyle   Do an exam   Give you vaccines if needed   Talk to you about any medicines you take   Give advice about your health   Answer your questions  Getting regular check-ups is an important part of taking care of your health. It can help your doctor find and treat any problems you have. But it's also important for preventing health problems.  A routine physical is different from a \"sick visit.\" A sick visit is when you see a doctor because of a health concern or problem. Since physicals are scheduled ahead of time, you can think about what you want to ask the doctor.  How often should I get a physical? -- It depends on your age and health. In general, for people age 21 years and older:   If you are younger than 50 years, you might be able to get a physical every 3 years.   If you are 50 years or older, your doctor might recommend a physical every year.  If you have an ongoing health condition, like diabetes or high blood pressure, your doctor will probably want to see you more often.  What happens during a physical? -- In general, each visit will include:   Physical exam - The doctor or nurse will check your height, weight, heart rate, and blood pressure. They will also look at your eyes and ears. They will ask about how you are feeling and whether you have any symptoms that bother you.   Medicines - It's a good idea to bring a list of all the medicines you take to each doctor visit. Your doctor will talk to you about your medicines and answer any questions. Tell them if you are having any side effects that bother you. You " "should also tell them if you are having trouble paying for any of your medicines.   Habits and behaviors - This includes:   Your diet   Your exercise habits   Whether you smoke, drink alcohol, or use drugs   Whether you are sexually active   Whether you feel safe at home  Your doctor will talk to you about things you can do to improve your health and lower your risk of health problems. They will also offer help and support. For example, if you want to quit smoking, they can give you advice and might prescribe medicines. If you want to improve your diet or get more physical activity, they can help you with this, too.   Lab tests, if needed - The tests you get will depend on your age and situation. For example, your doctor might want to check your:   Cholesterol   Blood sugar   Iron level   Vaccines - The recommended vaccines will depend on your age, health, and what vaccines you already had. Vaccines are very important because they can prevent certain serious or deadly infections.   Discussion of screening - \"Screening\" means checking for diseases or other health problems before they cause symptoms. Your doctor can recommend screening based on your age, risk, and preferences. This might include tests to check for:   Cancer, such as breast, prostate, cervical, ovarian, colorectal, prostate, lung, or skin cancer   Sexually transmitted infections, such as chlamydia and gonorrhea   Mental health conditions like depression and anxiety  Your doctor will talk to you about the different types of screening tests. They can help you decide which screenings to have. They can also explain what the results might mean.   Answering questions - The physical is a good time to ask the doctor or nurse questions about your health. If needed, they can refer you to other doctors or specialists, too.  Adults older than 65 years often need other care, too. As you get older, your doctor will talk to you about:   How to prevent falling at " home   Hearing or vision tests   Memory testing   How to take your medicines safely   Making sure that you have the help and support you need at home  All topics are updated as new evidence becomes available and our peer review process is complete.  This topic retrieved from [employer] on: May 02, 2024.  Topic 518082 Version 1.0  Release: 32.4.3 - C32.122  © 2024 UpToDate, Inc. and/or its affiliates. All rights reserved.  Consumer Information Use and Disclaimer   Disclaimer: This generalized information is a limited summary of diagnosis, treatment, and/or medication information. It is not meant to be comprehensive and should be used as a tool to help the user understand and/or assess potential diagnostic and treatment options. It does NOT include all information about conditions, treatments, medications, side effects, or risks that may apply to a specific patient. It is not intended to be medical advice or a substitute for the medical advice, diagnosis, or treatment of a health care provider based on the health care provider's examination and assessment of a patient's specific and unique circumstances. Patients must speak with a health care provider for complete information about their health, medical questions, and treatment options, including any risks or benefits regarding use of medications. This information does not endorse any treatments or medications as safe, effective, or approved for treating a specific patient. UpToDate, Inc. and its affiliates disclaim any warranty or liability relating to this information or the use thereof.The use of this information is governed by the Terms of Use, available at https://www.[employer]ationsuwer.com/en/know/clinical-effectiveness-terms. 2024© UpToDate, Inc. and its affiliates and/or licensors. All rights reserved.  Copyright   © 2024 UpToDate, Inc. and/or its affiliates. All rights reserved.

## 2025-06-27 NOTE — ASSESSMENT & PLAN NOTE
Labs reviewed with patient. T.cholesterol 228, ldl 157, triglycerides 157. Lifestyle modifications discussed. Repeat labs 3-6 months.

## 2025-06-27 NOTE — ASSESSMENT & PLAN NOTE
Prior Authorization Clinical Questions for Weight Management Pharmacotherapy    1. Does the patient have a contrainidcation to medication prescribed for weight management?: No  2. Does the patient have a diagnosis of obesity, confirmed by a BMI greater than or equal to 30 kg/m^2?: Yes  3. Does the patient have a BMI of greater than or equal to 27 kg/m^2 with at least one weight-related comorbidity/risk factor/complication (e.g. diabetes, dyslipidemia, coronary artery disease)?: Yes  4. Weight-related co-morbidities/risk factors: dyslipidemia, obstructive sleep apnea (ISAAK), depression  5. WEGOVY CVA Indication: Does patient have established documented cardiovascular disease (history of a prior heart attack (myocardial infarction), stroke, or symptomatic peripheral arterial disease (PAD)?: No  6. ZEPBOUND ISAAK Indication: Does patient have documented ISAAK diagnosed via sleep study (insurance will require copy of sleep study results for approval)?: Yes  7. Has the patient been on a weight loss regimen of low-calorie diet, increased physical activity, and lifestyle modifications for a minimum of 6 months?: Yes  8. Has the patient completed a comprehensive weight loss program (ie, Weight Watchers, Noom, Bariatrics, other priya on phone)? If so, what?: Yes   -- Q8 Further Explanation: apps on phone   9. Does the patient have a history of type 2 diabetes?: No  10. Has the member tried and failed other weight loss medication within the past 12 months?: No  11. Will the member use requested medication in combination with another GLP agonist or weight loss drug?: No  12. Is the medication a controlled substance?: No     Baseline weight (in pounds): 256.8 lbs  Current weight (in pounds): 256.8 lbs  Weight loss percentage: 0%         Orders:    tirzepatide (Zepbound) 2.5 mg/0.5 mL auto-injector; Inject 0.5 mL (2.5 mg total) under the skin once a week for 28 days

## 2025-06-27 NOTE — TELEPHONE ENCOUNTER
PA for (Zepbound) 2.5 mg/0.5 mL auto-injector SUBMITTED to [facility]    via    []CMM-KEY:   [x]Surescripts-Case ID # [id number]   []Availity-Auth ID # NDC #   []Faxed to plan   []Other website   []Phone call Case ID #     [x]PA sent as URGENT    All office notes, labs and other pertaining documents and studies sent. Clinical questions answered. Awaiting determination from insurance company.     Turnaround time for your insurance to make a decision on your Prior Authorization can take 7-21 business days.

## 2025-06-30 NOTE — TELEPHONE ENCOUNTER
PA for (Zepbound) 2.5 mg/0.5 mL auto-injector APPROVED     Date(s) approved 06/27/25-02/22/26    Case # [id number]    Patient advised by          []MyChart Message  []Phone call - spoke with patient  []LMOM  []L/M to call office as no active Communication consent on file  []Unable to leave detailed message as VM not approved on Communication consent       Pharmacy advised by    [x]Fax  []Phone call  []Secure Chat     Approval letter scanned into Media Yes

## 2025-07-08 ENCOUNTER — TELEPHONE (OUTPATIENT)
Age: 55
End: 2025-07-08

## 2025-07-08 DIAGNOSIS — E66.01 CLASS 2 SEVERE OBESITY DUE TO EXCESS CALORIES WITH SERIOUS COMORBIDITY AND BODY MASS INDEX (BMI) OF 36.0 TO 36.9 IN ADULT (HCC): Primary | ICD-10-CM

## 2025-07-08 DIAGNOSIS — E66.812 CLASS 2 SEVERE OBESITY DUE TO EXCESS CALORIES WITH SERIOUS COMORBIDITY AND BODY MASS INDEX (BMI) OF 36.0 TO 36.9 IN ADULT (HCC): Primary | ICD-10-CM

## 2025-07-08 RX ORDER — TIRZEPATIDE 2.5 MG/.5ML
2.5 INJECTION, SOLUTION SUBCUTANEOUS WEEKLY
Qty: 4 ML | Refills: 0 | Status: SHIPPED | OUTPATIENT
Start: 2025-07-08

## 2025-07-09 DIAGNOSIS — G47.00 INSOMNIA, UNSPECIFIED TYPE: ICD-10-CM

## 2025-07-09 DIAGNOSIS — F41.1 GENERALIZED ANXIETY DISORDER: ICD-10-CM

## 2025-07-09 RX ORDER — LORAZEPAM 1 MG/1
1 TABLET ORAL DAILY PRN
Qty: 30 TABLET | Refills: 0 | Status: SHIPPED | OUTPATIENT
Start: 2025-07-09

## 2025-07-09 RX ORDER — LORAZEPAM 1 MG/1
1 TABLET ORAL DAILY PRN
Qty: 30 TABLET | Refills: 0 | Status: SHIPPED | OUTPATIENT
Start: 2025-08-06

## 2025-07-09 RX ORDER — LORAZEPAM 1 MG/1
1 TABLET ORAL DAILY PRN
Qty: 30 TABLET | Refills: 0 | Status: SHIPPED | OUTPATIENT
Start: 2025-09-03

## 2025-07-09 RX ORDER — ZOLPIDEM TARTRATE 10 MG/1
10 TABLET ORAL
Qty: 30 TABLET | Refills: 0 | Status: SHIPPED | OUTPATIENT
Start: 2025-09-03

## 2025-07-09 RX ORDER — ZOLPIDEM TARTRATE 10 MG/1
10 TABLET ORAL
Qty: 30 TABLET | Refills: 0 | Status: SHIPPED | OUTPATIENT
Start: 2025-07-09

## 2025-07-09 RX ORDER — ZOLPIDEM TARTRATE 10 MG/1
10 TABLET ORAL
Qty: 30 TABLET | Refills: 0 | Status: SHIPPED | OUTPATIENT
Start: 2025-08-06

## 2025-07-28 ENCOUNTER — TELEPHONE (OUTPATIENT)
Dept: FAMILY MEDICINE CLINIC | Facility: CLINIC | Age: 55
End: 2025-07-28

## 2025-07-31 DIAGNOSIS — E66.812 CLASS 2 SEVERE OBESITY DUE TO EXCESS CALORIES WITH SERIOUS COMORBIDITY AND BODY MASS INDEX (BMI) OF 36.0 TO 36.9 IN ADULT (HCC): ICD-10-CM

## 2025-07-31 DIAGNOSIS — E66.01 CLASS 2 SEVERE OBESITY DUE TO EXCESS CALORIES WITH SERIOUS COMORBIDITY AND BODY MASS INDEX (BMI) OF 36.0 TO 36.9 IN ADULT (HCC): ICD-10-CM

## 2025-08-01 DIAGNOSIS — E66.812 CLASS 2 SEVERE OBESITY DUE TO EXCESS CALORIES WITH SERIOUS COMORBIDITY AND BODY MASS INDEX (BMI) OF 36.0 TO 36.9 IN ADULT (HCC): Primary | ICD-10-CM

## 2025-08-01 DIAGNOSIS — E66.01 CLASS 2 SEVERE OBESITY DUE TO EXCESS CALORIES WITH SERIOUS COMORBIDITY AND BODY MASS INDEX (BMI) OF 36.0 TO 36.9 IN ADULT (HCC): Primary | ICD-10-CM

## 2025-08-01 RX ORDER — TIRZEPATIDE 2.5 MG/.5ML
2.5 INJECTION, SOLUTION SUBCUTANEOUS WEEKLY
Qty: 4 ML | Refills: 0 | OUTPATIENT
Start: 2025-08-01